# Patient Record
Sex: MALE | Race: WHITE | NOT HISPANIC OR LATINO | Employment: OTHER | URBAN - METROPOLITAN AREA
[De-identification: names, ages, dates, MRNs, and addresses within clinical notes are randomized per-mention and may not be internally consistent; named-entity substitution may affect disease eponyms.]

---

## 2018-10-02 ENCOUNTER — OFFICE VISIT (OUTPATIENT)
Dept: CARDIOLOGY CLINIC | Facility: CLINIC | Age: 68
End: 2018-10-02
Payer: COMMERCIAL

## 2018-10-02 VITALS
HEART RATE: 84 BPM | BODY MASS INDEX: 22.43 KG/M2 | WEIGHT: 148 LBS | HEIGHT: 68 IN | DIASTOLIC BLOOD PRESSURE: 80 MMHG | SYSTOLIC BLOOD PRESSURE: 144 MMHG

## 2018-10-02 DIAGNOSIS — R07.9 CHEST PAIN, UNSPECIFIED TYPE: Primary | ICD-10-CM

## 2018-10-02 DIAGNOSIS — I51.7 LEFT VENTRICULAR HYPERTROPHY BY ELECTROCARDIOGRAM: ICD-10-CM

## 2018-10-02 DIAGNOSIS — I42.1 HOCM (HYPERTROPHIC OBSTRUCTIVE CARDIOMYOPATHY) (HCC): ICD-10-CM

## 2018-10-02 PROCEDURE — 93000 ELECTROCARDIOGRAM COMPLETE: CPT | Performed by: INTERNAL MEDICINE

## 2018-10-02 PROCEDURE — 99204 OFFICE O/P NEW MOD 45 MIN: CPT | Performed by: INTERNAL MEDICINE

## 2018-10-02 RX ORDER — DIPHENOXYLATE HYDROCHLORIDE AND ATROPINE SULFATE 2.5; .025 MG/1; MG/1
1 TABLET ORAL DAILY
COMMUNITY

## 2018-10-02 RX ORDER — LANOLIN ALCOHOL/MO/W.PET/CERES
1 CREAM (GRAM) TOPICAL 3 TIMES DAILY
COMMUNITY
End: 2022-04-11 | Stop reason: CLARIF

## 2018-10-02 NOTE — PROGRESS NOTES
HCM Consultation - Cardiology   Satinder Dallas 74 y o  male MRN: [id number]  Unit/Bed#:  Encounter: [id number]      Assessment & plan: Palpitation, Chest pain, episodic hypertension, family history of sudden cardiac death, family history of cardiomyopathy    Mr Codie Escalante has suffered from palpitation for a number of years  He has family history of sudden cardiac death and a sister that reportedly is diagnosed to have hypertrophic cardiomyopathy  He is active and has been able to play golf 2 days a week and work part time 3 days a week without limitation during low to moderate intensity physical activity  His blood pressure in the office was 144/80 mmHg on arrival and 148/85 mmHg on repeat measurement  His ECG also shows LVH by voltage, vertical axis, and left atrial abnormality  I have recommended that Mr Codie Escalante undergo exercise stress testing to evaluate cardiac anatomy and function, exclude exercise induced ischemia and assess blood pressure response to exercise and monitor heart rhythm during exertion  He may require initiation of antihypertensive therapy  He states that his lipid profile has been checked and has been within normal limits  Physician Requesting Consult: Primary care  Reason for Consult / Principal Problem: Suspected hypertrophic cardiomyopathy    HPI: Satinder Dallas is a 74y o  year old retired  who presents with complaints of palpitation and chest discomfort  The symptoms began in 2014 and triggered work up that included pharmacologic myocardial perfusion imaging (2014) and echocardiogram (2014) that were reportedly negative and a Holter monitor (2014) that showed premature atrial and ventricular premature beats that did not correlate with the timing of his symptoms (2014)  He continued to be symptomatic and was again evaluated in 2016 and 2018  Most recently, he had a 2-week ambulatory ECG monitor that also reportedly showed no high grade arrhythmias or significant pauses   He is healthy, physically fit with a BMI of 22 5 kg/m2 and is a retired  who plays golf for 2 days and works part time for 3 days a week  His past medical history is significant for testicular cancer s/p right orchidectomy and recent diagnosis of prostate enlargement on a CT scan taken in follow up evaluation of his cancer  He does have symptoms of prostatism and in particular wakes up more than once at night to urinate  Mr Dulce Doherty is quite concerned about his symptoms of palpitation, chest discomfort and occasional dizziness particularly after the hospitalization of her sister  Family history  Father  at age 43 suddenly for uncertain reason although he had drinking problem, mother  at age 80 (1) of stroke, she had dementia and bipolar disorder, a 76year old sister has had diabetes and has been diagnosed with "hypertrophic cardiomyopathy" after been taken to a hospital in [place] with chest pain and shortness of breath while running, a 61year old brother committed suicide by shooting himself at age 61 (26)  He also had problem with excess alcohol intake  Mr Dulce Doherty has 2 children, a 36year old son with diabetes and a 28year old daughter without known health problem  He also has four grandchildren, 2 from each of his children  His son has a boy (15) and a girl (5) and his daughter has 3boys (1years old and 6 months old)  The grandchildren have had no heart problems and his daughter has gone through pregnancies without any issue  Consults    Review of Systems:  All systems negative except for: palpitation, chest pain, dizziness, nocturia, difficulty sleeping, daytime sleepiness, arthritis and back pain      Historical Information         No current outpatient prescriptions on file prior to visit  No current facility-administered medications on file prior to visit        No Known Allergies  History   Alcohol Use    Yes     Comment: 1-4 weekly     History   Drug Use No     History Smoking Status    Never Smoker   Smokeless Tobacco    Never Used         Objective   Vitals: Blood pressure 144/80, pulse 84, height 5' 8" (1 727 m), weight 67 1 kg (148 lb)  , Body mass index is 22 5 kg/m² ,     Invasive Devices          No matching active lines, drains, or airways        Physical Exam:  GEN: Shanice West appears well, alert and oriented x 3, pleasant and cooperative   HEENT: pupils equal, round, and reactive to light; extraocular muscles intact  NECK: supple, no carotid bruits   HEART: regular rhythm, normal S1 and S2, no murmurs, clicks, gallops or rubs   LUNGS: clear to auscultation bilaterally; no wheezes, rales, or rhonchi   ABDOMEN: normal bowel sounds, soft, no tenderness, no distention  EXTREMITIES: peripheral pulses normal; no clubbing, cyanosis, or edema  NEURO: no focal findings   SKIN: normal without suspicious lesions on exposed skin    Lab Results:   Labs:  No results found for: WBC, RBC, HGB, HCT, MCV, PLT, RDW  No results found for: NA, K, CL, CO2, ANIONGAP, BUN, CREATININE, EGFR, GLUCOSE, CALCIUM, AST, ALT, ALKPHOS, PROT, ALBUMIN, BILITOT  No results found for: MG  No results found for: CHOL, HDL, TRIG, LDLCALC  No results found for: FDB5LYARGHLX, T3FREE, FREET4, K1TAIVI, Y2ZQLES    Imaging:   I have personally reviewed pertinent results in EPIC    EKG: Normal sinus rhythm at 84 bpm, LVH by voltage, vertical axis, and left atrial abnormality  Cardiac testing:   No results found for this or any previous visit  Counseling / Coordination of Care  Total floor / unit time spent today 60 minutes  Greater than 50% of total time was spent with the patient and / or family counseling and / or coordination of care

## 2018-10-08 DIAGNOSIS — I42.9 CARDIOMYOPATHY, UNSPECIFIED TYPE (HCC): Primary | ICD-10-CM

## 2018-10-11 ENCOUNTER — HOSPITAL ENCOUNTER (OUTPATIENT)
Dept: NON INVASIVE DIAGNOSTICS | Facility: HOSPITAL | Age: 68
Discharge: HOME | End: 2018-10-11
Payer: COMMERCIAL

## 2018-10-11 DIAGNOSIS — I42.9 CARDIOMYOPATHY, UNSPECIFIED TYPE (HCC): ICD-10-CM

## 2018-10-11 PROCEDURE — 93306 TTE W/DOPPLER COMPLETE: CPT | Performed by: INTERNAL MEDICINE

## 2018-10-11 PROCEDURE — 93018 CV STRESS TEST I&R ONLY: CPT | Performed by: INTERNAL MEDICINE

## 2018-10-11 PROCEDURE — 93017 CV STRESS TEST TRACING ONLY: CPT

## 2018-10-11 PROCEDURE — 93306 TTE W/DOPPLER COMPLETE: CPT

## 2018-10-11 PROCEDURE — 93016 CV STRESS TEST SUPVJ ONLY: CPT | Performed by: INTERNAL MEDICINE

## 2018-10-12 LAB
CHEST PAIN STATEMENT: NORMAL
MAX DIASTOLIC BP: 72 MMHG
MAX HEART RATE: 157 BPM
MAX PREDICTED HEART RATE: 155 BPM
MAX. SYSTOLIC BP: 184 MMHG
PROTOCOL NAME: NORMAL
REASON FOR TERMINATION: NORMAL
TARGET HR FORMULA: NORMAL
TEST INDICATION: NORMAL
TIME IN EXERCISE PHASE: NORMAL

## 2018-10-15 ENCOUNTER — TELEPHONE (OUTPATIENT)
Dept: CARDIOLOGY CLINIC | Facility: CLINIC | Age: 68
End: 2018-10-15

## 2018-10-15 NOTE — TELEPHONE ENCOUNTER
Pt called for f/u with Dr El Signs to discuss testing results  Given appt for tomorrow with Dr Tolentino Ink

## 2018-10-16 ENCOUNTER — OFFICE VISIT (OUTPATIENT)
Dept: CARDIOLOGY CLINIC | Facility: CLINIC | Age: 68
End: 2018-10-16
Payer: COMMERCIAL

## 2018-10-16 VITALS
SYSTOLIC BLOOD PRESSURE: 138 MMHG | WEIGHT: 150.3 LBS | HEART RATE: 80 BPM | DIASTOLIC BLOOD PRESSURE: 70 MMHG | BODY MASS INDEX: 22.78 KG/M2 | HEIGHT: 68 IN

## 2018-10-16 DIAGNOSIS — I10 HTN (HYPERTENSION): Primary | ICD-10-CM

## 2018-10-16 PROCEDURE — 99214 OFFICE O/P EST MOD 30 MIN: CPT | Performed by: INTERNAL MEDICINE

## 2018-10-16 RX ORDER — BISOPROLOL FUMARATE 5 MG/1
5 TABLET ORAL DAILY
Qty: 90 TABLET | Refills: 2 | Status: SHIPPED | OUTPATIENT
Start: 2018-10-16 | End: 2019-01-14 | Stop reason: SDUPTHER

## 2018-10-17 NOTE — PROGRESS NOTES
HCM Clinic Follow Up - Cardiology   Ruby Dickey 74 y o  male MRN: [id number]  Unit/Bed#:  Encounter: [id number]      Assessment: Family history of HCM, Palpitation, Hypertension    Plan: Since last office visit on 10-2-2018, Mr Maria Del Carmen Peck has continued to have palpitation and occasional sensation of enhanced arterial pulsations in the neck and head area  He completed an echocardiogram on 10- that showed normal LV size and systolic function with an ejection fraction of 60 % and no regional wall motion abnormalities; no evidence of LVH, no significant valvular abnormality and borderline increase in the size of the aortic root (3 7 cm)  He also had a treadmill exercise performed that showed excellent exercise tolerance (duration of exercise was 13 min and maximal work rate was 15 3 METs), reached a peak heart rate of 157 bpm ( 101 % of maximal predicted heart rate)  He was, however, hypertensive at baseline (162/80) with appropriate blood pressure response to stress  There was no chest pain during stress, the stress ECG was normal and no significant arrhythmias were noted (isolated premature ventricular beats)  His blood pressure was high during echocardiography as well  He does not add salt to his diet although he does eat out often, has maintained an ideal body weight and is quite active  He does have strong family history of hypertension  His lipid profile will be checked by his family physician  I have not yot received the information on her sister's cardiac problem  At this point, I have advised him to start bisoprolol that would help with blood pressure control and perhaps help with the sensation of skipped heart beats that is his main concern at this point  I will review his sister's records when I receive it (apparently with genetic testing result) and advise him regarding need for further evaluation   He will check his blood pressure twice daily and share the findings with me in the next 2 weeks     Physician Requesting Consult: Alicia Mckeon 3 patient    Reason for Consult / Principal Problem: Family history of HCM, Palpitation, Hypertension    HPI: Cresencio Orellana is a 74y o  year old retired  who presents with complaints of palpitation and chest discomfort  The symptoms began in  and triggered work up that included pharmacologic myocardial perfusion imaging () and echocardiogram () that were reportedly negative and a Holter monitor () that showed premature atrial and ventricular premature beats that did not correlate with the timing of his symptoms ()  He continued to be symptomatic and was again evaluated in  and 2018  Most recently, he had a 2-week ambulatory ECG monitor that also reportedly showed no high grade arrhythmias or significant pauses  He is healthy, physically fit with a BMI of 22 5 kg/m2 and is a retired  who plays golf for 2 days and works part time for 3 days a week  His past medical history is significant for testicular cancer s/p right orchidectomy and recent diagnosis of prostate enlargement on a CT scan taken in follow up evaluation of his cancer  He does have symptoms of prostatism and in particular wakes up more than once at night to urinate  Mr Mable Bond is quite concerned about his symptoms of palpitation, chest discomfort and occasional dizziness particularly after the hospitalization of her sister       Family history  Father  at age 43 suddenly for uncertain reason although he had drinking problem, mother  at age 80 (1) of stroke, she had dementia and bipolar disorder, a 76year old sister has had diabetes and has been diagnosed with "hypertrophic cardiomyopathy" after been taken to a hospital in [place] with chest pain and shortness of breath while running, a 61year old brother committed suicide by shooting himself at age 61 (26)  He also had problem with excess alcohol intake   Mr Mable Bond has 2 children, a 36year old son with diabetes and a 28year old daughter without known health problem  He also has four grandchildren, 2 from each of his children  His son has a boy (15) and a girl (5) and his daughter has 3boys (1years old and 6 months old)  The grandchildren have had no heart problems and his daughter has gone through pregnancies without any issue  Consults    Review of Systems:  All systems negative except for: palpitation, chest pain, dizziness, nocturia, difficulty sleeping, daytime sleepiness, arthritis and back pain    Historical Information   No past medical history on file  No past surgical history on file  History   Alcohol Use    Yes     Comment: 1-4 weekly     History   Drug Use No     History   Smoking Status    Never Smoker   Smokeless Tobacco    Never Used     Meds/Allergies   all current active meds have been reviewed  No Known Allergies    Objective   Vitals: Blood pressure 138/70, pulse 80, height 5' 8" (1 727 m), weight 68 2 kg (150 lb 4 8 oz)  , Body mass index is 22 85 kg/m² ,     Invasive Devices          No matching active lines, drains, or airways        Physical Exam:  GEN: Mirta Banegas appears well, alert and oriented x 3, pleasant and cooperative   HEENT: pupils equal, round, and reactive to light; extraocular muscles intact  NECK: supple, no carotid bruits   HEART: regular rhythm, normal S1 and S2, no murmurs, clicks, gallops or rubs   LUNGS: clear to auscultation bilaterally; no wheezes, rales, or rhonchi   ABDOMEN: normal bowel sounds, soft, no tenderness, no distention  EXTREMITIES: peripheral pulses normal; no clubbing, cyanosis, or edema  NEURO: no focal findings   SKIN: normal without suspicious lesions on exposed skin    Lab Results:   No visits with results within 1 Day(s) from this visit     Latest known visit with results is:   Hospital Outpatient Visit on 10/11/2018   Component Date Value    Protocol Name 10/11/2018 STEPHANY     Time In Exercise Phase 10/11/2018 00:13:00     MAX  SYSTOLIC BP 20/59/3204 125     Max Diastolic Bp 24/90/7758 72     Max Heart Rate 10/11/2018 157     Max Predicted Heart Rate 10/11/2018 155     Reason for Termination 10/11/2018 Fatigue     Test Indication 10/11/2018 CARDIOMYOPATHY- HOCM     Target Hr Formular 10/11/2018 (220 - Age)*100%     Chest Pain Statement 10/11/2018 none        Imaging: I have personally reviewed pertinent reports  Echocardiogram, stress test    EKG: ECG strips from stress test    Counseling / Coordination of Care  Total floor / unit time spent today 40 minutes  Greater than 50% of total time was spent with the patient and / or family counseling and / or coordination of care

## 2019-01-14 ENCOUNTER — TELEPHONE (OUTPATIENT)
Dept: CARDIOLOGY CLINIC | Facility: CLINIC | Age: 69
End: 2019-01-14

## 2019-01-14 DIAGNOSIS — I10 HYPERTENSION, UNSPECIFIED TYPE: Primary | ICD-10-CM

## 2019-01-29 ENCOUNTER — OFFICE VISIT (OUTPATIENT)
Dept: CARDIOLOGY CLINIC | Facility: CLINIC | Age: 69
End: 2019-01-29
Payer: COMMERCIAL

## 2019-01-29 VITALS
HEART RATE: 72 BPM | HEIGHT: 68 IN | SYSTOLIC BLOOD PRESSURE: 120 MMHG | BODY MASS INDEX: 22.19 KG/M2 | DIASTOLIC BLOOD PRESSURE: 82 MMHG | WEIGHT: 146.4 LBS

## 2019-01-29 DIAGNOSIS — I10 HYPERTENSION: Primary | ICD-10-CM

## 2019-01-29 PROCEDURE — 99214 OFFICE O/P EST MOD 30 MIN: CPT | Performed by: INTERNAL MEDICINE

## 2019-01-30 NOTE — PROGRESS NOTES
HCM Clinic Follow Up Visit - Cardiology   Zoey De Santiago 79 y o  male MRN: [id number]  Unit/Bed#:  Encounter: [id number]      Assessment: Hypertension, possible HCM      Plan: Mr Sobeida Price has done extremely well since last office visit  He has brought with him home blood pressure readings that range from 115 to 734 mmHg systolic and 70 to 80 mmHg diastolic  He denies chest pain, shortness of breath and dizziness and feels his palpitation has improved significantly  He exercises regularly and remains active, follows a heart healthy diet low in sodium and is compliant with his medications  I reviewed the medical records of his sister, Sweta Ford, who has been evaluated at the [facility] in [place] and appears to me that she has a similar problem likely caused by systemic hypertension  In her evaluation, much emphasis has been given to the reportedly sudden death of their father at age 43 and to Mr Calle's diagnosis of HCM  However, the circumstances of father's death is unclear and the contribution of excess alcohol intake may have been significant  Mr Calle's mild concentric left ventricular hypertrophy is also well explained by his systemic hypertension  Genetic testing at this point does not seem to be helpful considering the otherwise benign family history and mild phenotypic expression  His risk stratification is complete and he would not require further testing at this point  Physician Requesting Consult: Alicia Mckeon 3 Patient  Reason for Consult / Principal Problem: Hypertension, possible HCM  HPI: Georgina Saha a 79y o  year old retired  who presents with complaints of palpitation and chest discomfort   The symptoms began in 2014 and triggered work up that included pharmacologic myocardial perfusion imaging (2014) and echocardiogram (2014) that were reportedly negative and a Holter monitor (2014) that showed premature atrial and ventricular premature beats that did not correlate with the timing of his symptoms (2014)  He continued to be symptomatic and was again evaluated in 2016 and 2018  Most recently, he had a 2-week ambulatory ECG monitor that also reportedly showed no high grade arrhythmias or significant pauses  He is healthy, physically fit with a BMI of 22 5 kg/m2 and is a retired  who plays golf for 2 days and works part time for 3 days a week  His past medical history is significant for testicular cancer s/p right orchidectomy and recent diagnosis of prostate enlargement on a CT scan taken in follow up evaluation of his cancer  He does have symptoms of prostatism and in particular wakes up more than once at night to urinate  Mr Gabriela Jimenes is quite concerned about his symptoms of palpitation, chest discomfort and occasional dizziness particularly after the hospitalization of her sister  He completed an echocardiogram on 10- that showed normal LV size and systolic function with an ejection fraction of 60% and no regional wall motion abnormalities; no evidence of LVH, no significant valvular abnormality and borderline increase in the size of the aortic root (3 7 cm)  He also had a treadmill exercise performed that showed excellent exercise tolerance (duration of exercise was 13 min and maximal work rate was 15 3 METs), reached a peak heart rate of 157 bpm ( 101 % of maximal predicted heart rate)  He was, however, hypertensive at baseline (162/80) with appropriate blood pressure response to stress  There was no chest pain during stress, the stress ECG was normal and no significant arrhythmias were noted (isolated premature ventricular beats)  His blood pressure was high during echocardiography as well  He does not add salt to his diet although he does eat out often, has maintained an ideal body weight and is quite active  He does have strong family history of hypertension  His lipid profile will be checked by his family physician   He was started on bisoprolol to treat high blood pressure and help with the sensation of skipped heart beats that was his main concern at that point  Family history  Father  at age 43 suddenly for uncertain reason although he had drinking problem, mother  at age 80 (1) of stroke, she had dementia and bipolar disorder, a 76year old sister has had diabetes and has been diagnosed with "hypertrophic cardiomyopathy" after been taken to a hospital in [place] with chest pain and shortness of breath while running, a 61year old brother committed suicide by shooting himself at age 61 (26)  He also had problem with excess alcohol intake  Mr Dallas Deng has 2 children, a 36year old son with diabetes and a 28year old daughter without known health problem  He also has four grandchildren, 2 from each of his children  His son has a boy (15) and a girl (5) and his daughter has 3boys (1years old and 6 months old)  The grandchildren have had no heart problems and his daughter has gone through pregnancies without any issue  Consults    Review of Systems: All systems negative except for: rare episode of palpitation usually when in bed at night, denies chest pain, dizziness, nocturia, difficulty sleeping, daytime sleepiness, arthritis and back pain    Historical Information   No past medical history on file  No past surgical history on file  History   Alcohol Use    Yes     Comment: 1-4 weekly     History   Drug Use No     History   Smoking Status    Never Smoker   Smokeless Tobacco    Never Used       Meds/Allergies   all current active meds have been reviewed  No Known Allergies    Objective   Vitals: Blood pressure 120/82, pulse 72, height 5' 8" (1 727 m), weight 66 4 kg (146 lb 6 4 oz)  , Body mass index is 22 26 kg/m² ,       Invasive Devices          No matching active lines, drains, or airways        Physical Exam:  GEN: Mirta Banegas appears well, alert and oriented x 3, pleasant and cooperative   HEENT: pupils equal, round, and reactive to light; extraocular muscles intact  NECK: supple, no carotid bruits   HEART: regular rhythm, normal S1 and S2, no murmurs, clicks, gallops or rubs   LUNGS: clear to auscultation bilaterally; no wheezes, rales, or rhonchi   ABDOMEN: normal bowel sounds, soft, no tenderness, no distention  EXTREMITIES: peripheral pulses normal; no clubbing, cyanosis, or edema  NEURO: no focal findings   SKIN: normal without suspicious lesions on exposed skin    Lab Results:   No visits with results within 1 Day(s) from this visit  Latest known visit with results is:   Hospital Outpatient Visit on 10/11/2018   Component Date Value    Protocol Name 10/11/2018 Vilma Badillo Time In Exercise Phase 10/11/2018 00:13:00     MAX  SYSTOLIC BP 78/67/4469 800     Max Diastolic Bp 06/01/4059 72     Max Heart Rate 10/11/2018 157     Max Predicted Heart Rate 10/11/2018 155     Reason for Termination 10/11/2018 Fatigue     Test Indication 10/11/2018 CARDIOMYOPATHY- HOCM     Target Hr Formular 10/11/2018 (220 - Age)*100%     Chest Pain Statement 10/11/2018 none        Imaging: I have personally reviewed pertinent reports  Counseling / Coordination of Care  Total floor / unit time spent today 40 minutes  Greater than 50% of total time was spent with the patient and / or family counseling and / or coordination of care

## 2019-02-12 RX ORDER — BISOPROLOL FUMARATE 5 MG/1
5 TABLET ORAL DAILY
Qty: 90 TABLET | Refills: 3 | Status: SHIPPED | OUTPATIENT
Start: 2019-02-12 | End: 2019-03-21

## 2019-05-21 ENCOUNTER — OFFICE VISIT (OUTPATIENT)
Dept: CARDIOLOGY CLINIC | Facility: CLINIC | Age: 69
End: 2019-05-21
Payer: COMMERCIAL

## 2019-05-21 VITALS
WEIGHT: 142 LBS | DIASTOLIC BLOOD PRESSURE: 56 MMHG | OXYGEN SATURATION: 97 % | BODY MASS INDEX: 21.52 KG/M2 | HEIGHT: 68 IN | HEART RATE: 88 BPM | SYSTOLIC BLOOD PRESSURE: 118 MMHG

## 2019-05-21 DIAGNOSIS — I10 HYPERTENSION: Primary | ICD-10-CM

## 2019-05-21 PROCEDURE — 99213 OFFICE O/P EST LOW 20 MIN: CPT | Performed by: INTERNAL MEDICINE

## 2019-05-21 RX ORDER — DILTIAZEM HYDROCHLORIDE 60 MG/1
60 CAPSULE, EXTENDED RELEASE ORAL 2 TIMES DAILY
Qty: 60 CAPSULE | Refills: 0 | Status: SHIPPED | OUTPATIENT
Start: 2019-05-21 | End: 2019-06-18 | Stop reason: SDUPTHER

## 2019-06-18 DIAGNOSIS — I10 HYPERTENSION: ICD-10-CM

## 2019-06-19 RX ORDER — DILTIAZEM HYDROCHLORIDE 60 MG/1
60 CAPSULE, EXTENDED RELEASE ORAL 2 TIMES DAILY
Qty: 60 CAPSULE | Refills: 0 | Status: SHIPPED | OUTPATIENT
Start: 2019-06-19 | End: 2019-11-26

## 2019-06-21 ENCOUNTER — TELEPHONE (OUTPATIENT)
Dept: GASTROENTEROLOGY | Facility: CLINIC | Age: 69
End: 2019-06-21

## 2019-11-26 ENCOUNTER — OFFICE VISIT (OUTPATIENT)
Dept: CARDIOLOGY CLINIC | Facility: CLINIC | Age: 69
End: 2019-11-26
Payer: COMMERCIAL

## 2019-11-26 VITALS
HEIGHT: 68 IN | WEIGHT: 141.2 LBS | HEART RATE: 76 BPM | SYSTOLIC BLOOD PRESSURE: 136 MMHG | BODY MASS INDEX: 21.4 KG/M2 | DIASTOLIC BLOOD PRESSURE: 84 MMHG

## 2019-11-26 DIAGNOSIS — I10 ESSENTIAL HYPERTENSION: Primary | ICD-10-CM

## 2019-11-26 DIAGNOSIS — R00.2 PALPITATIONS: ICD-10-CM

## 2019-11-26 DIAGNOSIS — I49.3 PVC (PREMATURE VENTRICULAR CONTRACTION): ICD-10-CM

## 2019-11-26 DIAGNOSIS — I49.1 APC (ATRIAL PREMATURE CONTRACTIONS): ICD-10-CM

## 2019-11-26 PROCEDURE — 99214 OFFICE O/P EST MOD 30 MIN: CPT | Performed by: INTERNAL MEDICINE

## 2019-11-26 PROCEDURE — 93000 ELECTROCARDIOGRAM COMPLETE: CPT | Performed by: INTERNAL MEDICINE

## 2019-11-26 RX ORDER — DOXAZOSIN MESYLATE 1 MG/1
1 TABLET ORAL
Qty: 30 TABLET | Refills: 0 | Status: SHIPPED | OUTPATIENT
Start: 2019-11-26 | End: 2022-04-11 | Stop reason: CLARIF

## 2019-11-26 NOTE — PROGRESS NOTES
[facility] Follow Up Visit - Cardiology   Mary Cartwright 81 y o  male MRN: [id number]  Unit/Bed#:  Encounter: [id number]      Active problem list:    Patient Active Problem List    Diagnosis Date Noted    Left ventricular hypertrophy by electrocardiogram 10/02/2018     Priority: Low    [facility] 10/02/2018     Priority: Low       Plan: Mr Xu Coto has not been tolerating the diltiazem and has stopped taking it  He particularly had noticed problems with erectile dysfunction  His home blood pressure readings show values as high as 159/96 mmHg  Today in the clinic his blood pressure is 136/84 mmHg  He denies headaches but has noted that his palpitation has been quite worse after stopping the diltiazem  His cancer is under control and has not shown recurrence after surgery and his prostate problem is stable  He remains active and works part time  I have advised Mr Rosmery Bazan to continue medical therapy for his systemic hypertension and particularly do not stop the medication on his own  He was started on doxazocin today at a very small dose hoping that it would also help with his prostatism symptoms  I have also ordered an extended ambulatory monitor for him to evaluate his frequent and disabling symptom of palpitation  He will be emailing his blood pressure readings and let me know how he feels on the new medication in about a week  He has had blood work done 2 months ago that I do not have access to and he will be sending those to me as well  Physician Requesting Consult: Primary care  Reason for Consult / Principal Problem: Suspected hypertrophic cardiomyopathyHPI: Mary Cartwright   HPI: Shon Living a 81 year old retired home [address] [place] [address]  presented with complaints of palpitation and chest discomfort   The symptoms began in 2014 and triggered work up that included pharmacologic myocardial perfusion imaging (2014) and echocardiogram (2014) that were reportedly negative and a Holter monitor (2014) that showed premature atrial and ventricular premature beats that did not correlate with the timing of his symptoms (2014)  He continued to be symptomatic and was again evaluated in 2016 and 2018  Most recently, he had a 2-week ambulatory ECG monitor that also reportedly showed no high grade arrhythmias or significant pauses  He is healthy, physically fit with a BMI of 22 5 kg/m2, plays golf for 2 days and works part time for 3 days a week  His past medical history is significant for testicular cancer (seminoma) s/p right orchidectomy and recent diagnosis of prostate enlargement on a CT scan taken in follow up evaluation of his cancer  He does have symptoms of prostatism and in particular wakes up more than once at night to urinate  Mr Destiny Kimball is quite concerned about his symptoms of palpitation, chest discomfort and occasional dizziness particularly after the hospitalization of her sister  He completed an echocardiogram on 10- that showed normal LV size and systolic function with an ejection fraction of 60% and no regional wall motion abnormalities; no evidence of LVH, no significant valvular abnormality and borderline increase in the size of the aortic root (3 7 cm)  He also had a treadmill exercise performed that showed excellent exercise tolerance (duration of exercise was 13 min and maximal work rate was 15 3 METs), reached a peak heart rate of 157 bpm (101 % of maximal predicted heart rate)  He was, however, hypertensive at baseline (162/80) with appropriate blood pressure response to stress  There was no chest pain during stress, the stress ECG was normal and no significant arrhythmias were noted (isolated premature ventricular beats)  His blood pressure was high during echocardiography as well  He does not add salt to his diet although he does eat out often, has maintained an ideal body weight and is quite active  He does have strong family history of hypertension   His lipid profile is being checked by his family physician  He was started on bisoprolol to treat high blood pressure and help with the sensation of skipped heart beats that was his main concern at that point  On 1/29/2019: Mr Lily Montana has done extremely well since last office visit  He has brought with him home blood pressure readings that range from 115 to 206 mmHg systolic and 70 to 80 mmHg diastolic  He denies chest pain, shortness of breath and dizziness and feels his palpitation has improved significantly  He exercises regularly and remains active, follows a heart healthy diet low in sodium and is compliant with his medications  I reviewed the medical records of his sister, Rachel Hawkins, who has been evaluated at the [facility] in [place] and appears to me that she has a similar problem likely caused by systemic hypertension  In her evaluation, much emphasis has been given to the reportedly sudden death of their father at age 43 and to Mr Calle's diagnosis of HCM  However, the circumstances of father's death is unclear and the contribution of excess alcohol intake may have been significant  Mr Calle's mild concentric left ventricular hypertrophy is also well explained by his systemic hypertension  Genetic testing at this point does not seem to be helpful considering the otherwise benign family history and mild phenotypic expression  His risk stratification is complete and he would not require further testing at this point  On 5/21/2019: Mr Lily Montana has not tolerated the bisoprolol that was started for both hypertension and palpitation related to premature ventricular contractions  The medication was effective for both but resulted in insomnia, anxiety, chest pain, cold feet and hands, joint pain and most importantly problems getting and maintaining an erection   Even cutting the dose from 5 to 2 5 mg per day did not resolve the side effects and he stopped the medication altogether and noted rebound hypertension (140/90) and increased heart rate (resting heart rate of 90-95 bpm)  He was then started on losartan that has improved blood pressure control but has not done much for the rapid heart rate and palpitation  Today I have switched him from losartan to diltiazem hoping to be able to control both hypertension and palpitation  He will check his blood pressure on a daily basis and send me the results electronically  He has had a CT of chest with contrast on 3- outside the network that has reported mild dilation of the ascending aorta (4 1 cm)  He is scheduled to have a repeat study done next March  I have asked him to also let me know hoe diltiazem is working for his palpitation  If tolerated, it will be switched to once daily formulation of diltiazem       Family history  Father  at age 43 suddenly for uncertain reason although he had drinking problem, mother  at age 80 (1) of stroke, she had dementia and bipolar disorder, a 76year old sister has had diabetes and has been diagnosed with "hypertrophic cardiomyopathy" after being taken to a hospital in [place] with chest pain and shortness of breath while running, a brother committed suicide by shooting himself at age 61 ()  He also had problem with excess alcohol intake  Mr Joan Felipe has 2 children, a 36year old son with diabetes (on insulin pump) and a 28year old daughter without known health problem  He also has four grandchildren, 2 from each of his children  His son has a boy (15) and a girl (5) and his daughter has 3boys (1years old and 6 months old)  The grandchildren have had no heart problems and his daughter has gone through pregnancies without any issue      Review of Systems: Complains of insomnia partly related to his nocturia caused by an enlarged prostate  Denies chest pain, shortness of breath, dizziness or syncope  Shamir Fay does however experience palpitation on a daily basis   Has not noted and leg edema or ankle swelling  Social History     Substance and Sexual Activity   Alcohol Use Yes    Comment: 1-4 weekly     Social History     Substance and Sexual Activity   Drug Use No     Social History     Tobacco Use   Smoking Status Never Smoker   Smokeless Tobacco Never Used     Meds/Allergies   all current active meds have been reviewed  No Known Allergies    Objective   Vitals: Blood pressure 136/84, pulse 76, height 5' 8" (1 727 m), weight 64 kg (141 lb 3 2 oz)  , Body mass index is 21 47 kg/m² ,     Invasive Devices     None               Physical Exam:    GEN: Tyler Greene appears well, alert and oriented x 3, pleasant and cooperative   HEENT: pupils equal, round, and reactive to light; extraocular muscles intact  NECK: supple, no carotid bruits   HEART: regular rhythm, normal S1 and S2, no murmurs, clicks, gallops or rubs   LUNGS: clear to auscultation bilaterally; no wheezes, rales, or rhonchi   ABDOMEN: normal bowel sounds, soft, no tenderness, no distention  EXTREMITIES: peripheral pulses normal; no clubbing, cyanosis, or edema  NEURO: no focal findings   SKIN: normal without suspicious lesions on exposed skin    Lab Results:   No visits with results within 1 Day(s) from this visit  Latest known visit with results is:   Hospital Outpatient Visit on 10/11/2018   Component Date Value    Protocol Name 10/11/2018 [facility] Time In Exercise Phase 10/11/2018 00:13:00     MAX  SYSTOLIC BP 58/18/9893 668     Max Diastolic Bp 45/56/5948 72     Max Heart Rate 10/11/2018 157     Max Predicted Heart Rate 10/11/2018 155     Reason for Termination 10/11/2018 Fatigue     Test Indication 10/11/2018 CARDIOMYOPATHY- HOCM     Target Hr Formular 10/11/2018 (220 - Age)*100%     Chest Pain Statement 10/11/2018 none        Imaging: I have personally reviewed pertinent reports        EKG: Normal sinus rhythm at 76 bpm, possible left atrial abnormality    Counseling / Coordination of Care  Total floor / unit time spent today 40 minutes  Greater than 50% of total time was spent with the patient and / or family counseling and / or coordination of care

## 2019-12-04 ENCOUNTER — TELEPHONE (OUTPATIENT)
Dept: CARDIOLOGY CLINIC | Facility: CLINIC | Age: 69
End: 2019-12-04

## 2019-12-04 NOTE — TELEPHONE ENCOUNTER
Please verify if this patient needs prior authorization for zio path referral is in epic please check and let me know  Thank you

## 2019-12-16 ENCOUNTER — TELEPHONE (OUTPATIENT)
Dept: CARDIOLOGY CLINIC | Facility: CLINIC | Age: 69
End: 2019-12-16

## 2019-12-16 DIAGNOSIS — I42.1 HOCM (HYPERTROPHIC OBSTRUCTIVE CARDIOMYOPATHY) (HCC): ICD-10-CM

## 2019-12-16 DIAGNOSIS — I49.3 PVC (PREMATURE VENTRICULAR CONTRACTION): Primary | ICD-10-CM

## 2019-12-23 ENCOUNTER — HOSPITAL ENCOUNTER (OUTPATIENT)
Dept: NON INVASIVE DIAGNOSTICS | Facility: CLINIC | Age: 69
Discharge: HOME | End: 2019-12-23
Payer: COMMERCIAL

## 2019-12-23 DIAGNOSIS — I49.3 PVC (PREMATURE VENTRICULAR CONTRACTION): ICD-10-CM

## 2019-12-23 DIAGNOSIS — I42.1 HOCM (HYPERTROPHIC OBSTRUCTIVE CARDIOMYOPATHY) (HCC): ICD-10-CM

## 2019-12-23 PROCEDURE — 93226 XTRNL ECG REC<48 HR SCAN A/R: CPT

## 2019-12-23 PROCEDURE — 93225 XTRNL ECG REC<48 HRS REC: CPT

## 2019-12-31 PROCEDURE — 93227 XTRNL ECG REC<48 HR R&I: CPT | Performed by: INTERNAL MEDICINE

## 2020-01-02 ENCOUNTER — TELEPHONE (OUTPATIENT)
Dept: CARDIOLOGY CLINIC | Facility: CLINIC | Age: 70
End: 2020-01-02

## 2020-01-02 NOTE — TELEPHONE ENCOUNTER
P/C for holter results since zio patch denied  Pt has a f/u appt with Dr Suyapa Stringer 3/3/20  I/G#[phone]

## 2020-03-03 ENCOUNTER — OFFICE VISIT (OUTPATIENT)
Dept: CARDIOLOGY CLINIC | Facility: CLINIC | Age: 70
End: 2020-03-03
Payer: COMMERCIAL

## 2020-03-03 VITALS
DIASTOLIC BLOOD PRESSURE: 80 MMHG | WEIGHT: 142 LBS | HEART RATE: 92 BPM | BODY MASS INDEX: 21.52 KG/M2 | SYSTOLIC BLOOD PRESSURE: 128 MMHG | HEIGHT: 68 IN

## 2020-03-03 DIAGNOSIS — I10 ESSENTIAL HYPERTENSION: Primary | ICD-10-CM

## 2020-03-03 PROBLEM — C62.11 SEMINOMA OF DESCENDED RIGHT TESTIS (HCC): Status: ACTIVE | Noted: 2020-03-03

## 2020-03-03 PROBLEM — R00.2 PALPITATIONS: Status: ACTIVE | Noted: 2020-03-03

## 2020-03-03 PROBLEM — Z90.79 H/O UNILATERAL ORCHIECTOMY: Status: ACTIVE | Noted: 2020-03-03

## 2020-03-03 PROCEDURE — 99214 OFFICE O/P EST MOD 30 MIN: CPT | Performed by: INTERNAL MEDICINE

## 2020-03-03 NOTE — PROGRESS NOTES
[facility] Follow Up Visit - Cardiology   Chris Mabry 81 y o  male MRN: [id number]  Unit/Bed#:  Encounter: [id number]      Active problem list:    Patient Active Problem List   Diagnosis    Left ventricular hypertrophy by electrocardiogram    [facility]    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    H/O unilateral orchiectomy    Hypertension    Palpitations       Plan: Mr Opal Casey has been managing his hypertension with diet and exercise and has not taken any of his antihypertensive medications  He continues to complain of insomnia and nocturia due to benign prostate enlargement but chooses not to take the prescribed doxazocin  He is scheduled to have chest and abdomen CT to evaluate his ascending aortic aneurysm (41 mm) and the seminoma post surgical right orchidectomy  He will also see his oncologist after imaging studies are done  He will also have blood work including lipid profile  Ambulatory ECG monitor performed in December showed few isolated atrial and ventricular premature beats that did not correlate with the sensation of skipped beats  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  He also plays golf on Mondays and Wednesdays  He is following a heart healthy diet and has kept a normal body mass index  Physician Requesting Consult: Primary care  Reason for Consult / Principal Problem: Suspected hypertrophic cardiomyopathy     HPI: Bubba Calle is a 81 year old retired  who presented with complaints of palpitation and chest discomfort  The symptoms began in 2014 and triggered work up that included pharmacologic myocardial perfusion imaging (2014) and echocardiogram (2014) that were reportedly negative and a Holter monitor (2014) that showed premature atrial and ventricular premature beats that did not correlate with the timing of his symptoms (2014)   He continued to be symptomatic and was again evaluated in 2016 and 2018  Most recently, he had a 2-week ambulatory ECG monitor that also reportedly showed no high grade arrhythmias or significant pauses  He is healthy, physically fit with a BMI of 22 5 kg/m2, plays golf for 2 days and works part time for 3 days a week  His past medical history is significant for testicular cancer (seminoma) s/p right orchidectomy and recent diagnosis of prostate enlargement on a CT scan taken in follow up evaluation of his cancer  He does have symptoms of prostatism and in particular wakes up more than once at night to urinate  Mr Britany Herrera is quite concerned about his symptoms of palpitation, chest discomfort and occasional dizziness particularly after the hospitalization of her sister  He completed an echocardiogram on 10- that showed normal LV size and systolic function with an ejection fraction of 60% and no regional wall motion abnormalities; no evidence of LVH, no significant valvular abnormality and borderline increase in the size of the aortic root (3 7 cm)  He also had a treadmill exercise performed that showed excellent exercise tolerance (duration of exercise was 13 min and maximal work rate was 15 3 METs), reached a peak heart rate of 157 bpm (101 % of maximal predicted heart rate)  He was, however, hypertensive at baseline (162/80) with appropriate blood pressure response to stress  There was no chest pain during stress, the stress ECG was normal and no significant arrhythmias were noted (isolated premature ventricular beats)  His blood pressure was high during echocardiography as well  He does not add salt to his diet although he does eat out often, has maintained an ideal body weight and is quite active  He does have strong family history of hypertension  His lipid profile is being checked by his family physician   He was started on bisoprolol to treat high blood pressure and help with the sensation of skipped heart beats that was his main concern at that point       On 1/29/2019: Mr Leonard Ramos has done extremely well since last office visit  He has brought with him home blood pressure readings that range from 115 to 045 mmHg systolic and 70 to 80 mmHg diastolic  He denies chest pain, shortness of breath and dizziness and feels his palpitation has improved significantly  He exercises regularly and remains active, follows a heart healthy diet low in sodium and is compliant with his medications  I reviewed the medical records of his sister, Eugenia Sanchez, who has been evaluated at the [facility] in [place] and appears to me that she has a similar problem likely caused by systemic hypertension  In her evaluation, much emphasis has been given to the reportedly sudden death of their father at age 43 and to Mr Calle's diagnosis of HCM  However, the circumstances of father's death is unclear and the contribution of excess alcohol intake may have been significant  Mr Calle's mild concentric left ventricular hypertrophy is also well explained by his systemic hypertension  Genetic testing at this point does not seem to be helpful considering the otherwise benign family history and mild phenotypic expression  His risk stratification is complete and he would not require further testing at this point      On 5/21/2019: Mr Leonard Ramos has not tolerated the bisoprolol that was started for both hypertension and palpitation related to premature ventricular contractions  The medication was effective for both but resulted in insomnia, anxiety, chest pain, cold feet and hands, joint pain and most importantly problems getting and maintaining an erection  Even cutting the dose from 5 to 2 5 mg per day did not resolve the side effects and he stopped the medication altogether and noted rebound hypertension (140/90) and increased heart rate (resting heart rate of 90-95 bpm)   He was then started on losartan that has improved blood pressure control but has not done much for the rapid heart rate and palpitation  Today I have switched him from losartan to diltiazem hoping to be able to control both hypertension and palpitation  He will check his blood pressure on a daily basis and send me the results electronically  He has had a CT of chest with contrast on 3- outside the network that has reported mild dilation of the ascending aorta (4 1 cm)  He is scheduled to have a repeat study done next March  I have asked him to also let me know hoe diltiazem is working for his palpitation  If tolerated, it will be switched to once daily formulation of diltiazem  On 2019: Mr Sobeida Christine has not been tolerating the diltiazem and has stopped taking it  He particularly had noticed problems with erectile dysfunction  His home blood pressure readings show values as high as 159/96 mmHg  Today in the clinic his blood pressure is 136/84 mmHg  He denies headaches but has noted that his palpitation has been quite worse after stopping the diltiazem  His cancer is under control and has not shown recurrence after surgery and his prostate problem is stable  He remains active and works part time  I have advised Mr Destiny Kimball to continue medical therapy for his systemic hypertension and particularly do not stop the medication on his own  He was started on doxazocin today at a very small dose hoping that it would also help with his prostatism symptoms  I have also ordered an extended ambulatory monitor for him to evaluate his frequent and disabling symptom of palpitation  He will be emailing his blood pressure readings and let me know how he feels on the new medication in about a week   He has had blood work done 2 months ago that I do not have access to and he will be sending those to me as well       Family history  Father  at age 43 suddenly for uncertain reason although he had drinking problem, mother  at age 80 (1) of stroke, she had dementia and bipolar disorder, a 76year old sister has had diabetes and has been diagnosed with "hypertrophic cardiomyopathy" after being taken to a hospital in [place] with chest pain and shortness of breath while running, a brother committed suicide by shooting himself at age 61 (2010)  He also had problem with excess alcohol intake  Mr Palak Londono has 2 children, a 43year old son with diabetes (on insulin pump) and a 40year old daughter without known health problem  He also has four grandchildren, 2 from each of his children  His son has a boy (17) and a girl (8) and his daughter has 3boys (3years old and 3year old)  The grandchildren have had no heart problems and his daughter has gone through pregnancies without any issue  She is now pregnant and expecting to have a girl      Review of Systems: Complains of insomnia partly related to his nocturia caused by an enlarged prostate  Denies chest pain, shortness of breath, dizziness or syncope  Does not have as many skipped beats as before but has noticed that his heart rate is often fast  Has not noted and leg edema or ankle swelling      Historical Information   Past Medical History:   Diagnosis Date    Prostatism     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      No past surgical history on file  Social History     Substance and Sexual Activity   Alcohol Use Yes    Comment: 1-4 weekly     Social History     Substance and Sexual Activity   Drug Use No     Social History     Tobacco Use   Smoking Status Never Smoker   Smokeless Tobacco Never Used     Meds/Allergies   all current active meds have been reviewed  No Known Allergies    Objective   Vitals:   Vitals:    03/03/20 0903   BP: 128/80   BP Location: Right arm   Patient Position: Sitting   Cuff Size: Standard   Pulse: 92   Weight: 64 4 kg (142 lb)   Height: 5' 8" (1 727 m)   Body mass index is 21 59 kg/m²  Body surface area is 1 77 meters squared      Physical Exam:  GEN: Alessandro Lockwood appears well, alert and oriented x 3, pleasant and cooperative HEENT: pupils equal, round, and reactive to light; extraocular muscles intact  NECK: supple, no carotid bruits   HEART: regular rhythm, normal S1 and S2, no murmurs, clicks, gallops or rubs   LUNGS: clear to auscultation bilaterally; no wheezes, rales, or rhonchi   ABDOMEN: normal bowel sounds, soft, no tenderness, no distention  EXTREMITIES: peripheral pulses normal; no clubbing, cyanosis, or edema  NEURO: no focal findings   SKIN: normal without suspicious lesions on exposed skin    Lab Results:   No visits with results within 1 Day(s) from this visit  Latest known visit with results is:   Hospital Outpatient Visit on 10/11/2018   Component Date Value    Protocol Name 10/11/2018 [place] Time In Exercise Phase 10/11/2018 00:13:00     MAX  SYSTOLIC BP  422     Max Diastolic Bp  72     Max Heart Rate 10/11/2018 157     Max Predicted Heart Rate 10/11/2018 155     Reason for Termination 10/11/2018 Fatigue     Test Indication 10/11/2018 CARDIOMYOPATHY- HOCM     Target Hr Formular 10/11/2018 (220 - Age)*100%     Chest Pain Statement 10/11/2018 none      Cardiac testing:    Holter monitor - 48 hour   Status: Final result        PT NAME: Nomi Barragan  : 1950                      AGE: 81 y o  GENDER: male  MRN: [id number]                               PROCEDURE: Holter monitor - 48 hour      INDICATIONS: PVCs     DESCRIPTION OF FINDINGS:  The patient was monitored for a total of 48 hours  The patient was in sinus rhythm throughout the study  The average heart rate was 89 beats per minute  The heart rate ranged from 59 to 150 beats per minute      Ventricular ectopic activity consisted of 273 beats (0 1% of total beats)  2 were in couplets, 201 were single PVCs, 70 were single VEs  There was no sustained or nonsustained ventricular tachycardia      Supraventricular ectopic activity consisted of 235 beats (0 1% of total beats)  235 were single PACs   There was no supraventricular tachycardia identified  There was no evidence of atrial fibrillation or atrial flutter      There were no significant pauses  The longest R-R interval was 1 3 seconds  There was no evidence of advanced degree heart block      The accompanying patient diary notes symptoms of "skipped beats"  Correlation with the tracings at these times reveals isolated PVCs on some occasions; sinus rhythm or sinus tachycardia on other occasions      IMPRESSION:  1  No significant arrhythmia noted  2  Symptoms of skipped beats occasionally correlate to isolated PVCs, but the patient also appears to have this sensation without PVCs  3  Total PVC burden 273 beats (0 1%)  4  Total PAC burden 235 beats (0 1%)     Fellow: Chelsy Greene MD   Attending: Colton Diaz MD       Imaging: I have personally reviewed pertinent reports  Counseling / Coordination of Care  Total floor / unit time spent today 40 minutes  Greater than 50% of total time was spent with the patient and / or family counseling and / or coordination of care

## 2020-07-19 DIAGNOSIS — Z86.010 HISTORY OF COLON POLYPS: ICD-10-CM

## 2020-07-19 DIAGNOSIS — Z11.59 SCREENING FOR VIRAL DISEASE: ICD-10-CM

## 2020-07-19 PROCEDURE — U0003 INFECTIOUS AGENT DETECTION BY NUCLEIC ACID (DNA OR RNA); SEVERE ACUTE RESPIRATORY SYNDROME CORONAVIRUS 2 (SARS-COV-2) (CORONAVIRUS DISEASE [COVID-19]), AMPLIFIED PROBE TECHNIQUE, MAKING USE OF HIGH THROUGHPUT TECHNOLOGIES AS DESCRIBED BY CMS-2020-01-R: HCPCS

## 2020-07-21 LAB
INPATIENT: NORMAL
SARS-COV-2 RNA SPEC QL NAA+PROBE: NOT DETECTED

## 2020-07-22 ENCOUNTER — CLINICAL SUPPORT (OUTPATIENT)
Dept: GASTROENTEROLOGY | Facility: CLINIC | Age: 70
End: 2020-07-22

## 2020-07-22 DIAGNOSIS — Z86.010 HISTORY OF COLON POLYPS: Primary | ICD-10-CM

## 2020-07-23 VITALS — HEIGHT: 68 IN | WEIGHT: 142 LBS | BODY MASS INDEX: 21.52 KG/M2

## 2020-07-23 NOTE — PROGRESS NOTES
Phone prep with pt  Dx: hx of polyps  Rx sent to provider for signature  Instructions for suprep given  Meds reviewed  Instructions emailed to pt  covid testing completed

## 2020-07-27 ENCOUNTER — TELEPHONE (OUTPATIENT)
Dept: GASTROENTEROLOGY | Facility: CLINIC | Age: 70
End: 2020-07-27

## 2020-07-27 NOTE — TELEPHONE ENCOUNTER
Pt call transf'd from Endo  Pt reports prep too expensive  Left sample of SuPrep at front office/Pt will  this afternoon

## 2020-07-29 ENCOUNTER — HOSPITAL ENCOUNTER (OUTPATIENT)
Dept: GASTROENTEROLOGY | Facility: AMBULATORY SURGERY CENTER | Age: 70
Discharge: HOME | End: 2020-07-29
Payer: COMMERCIAL

## 2020-07-29 ENCOUNTER — ANESTHESIA EVENT (OUTPATIENT)
Dept: GASTROENTEROLOGY | Facility: AMBULATORY SURGERY CENTER | Age: 70
End: 2020-07-29

## 2020-07-29 ENCOUNTER — ANESTHESIA (OUTPATIENT)
Dept: GASTROENTEROLOGY | Facility: AMBULATORY SURGERY CENTER | Age: 70
End: 2020-07-29

## 2020-07-29 VITALS
OXYGEN SATURATION: 98 % | TEMPERATURE: 98.2 F | SYSTOLIC BLOOD PRESSURE: 149 MMHG | DIASTOLIC BLOOD PRESSURE: 93 MMHG | RESPIRATION RATE: 18 BRPM | HEART RATE: 72 BPM

## 2020-07-29 DIAGNOSIS — Z86.010 HISTORY OF COLON POLYPS: ICD-10-CM

## 2020-07-29 PROCEDURE — G0105 COLORECTAL SCRN; HI RISK IND: HCPCS | Performed by: INTERNAL MEDICINE

## 2020-07-29 RX ORDER — SODIUM CHLORIDE 9 MG/ML
50 INJECTION, SOLUTION INTRAVENOUS CONTINUOUS
Status: DISCONTINUED | OUTPATIENT
Start: 2020-07-29 | End: 2020-08-02 | Stop reason: HOSPADM

## 2020-07-29 RX ORDER — PROPOFOL 10 MG/ML
INJECTION, EMULSION INTRAVENOUS AS NEEDED
Status: DISCONTINUED | OUTPATIENT
Start: 2020-07-29 | End: 2020-07-29 | Stop reason: SURG

## 2020-07-29 RX ADMIN — PROPOFOL 30 MG: 10 INJECTION, EMULSION INTRAVENOUS at 08:33

## 2020-07-29 RX ADMIN — SODIUM CHLORIDE 50 ML/HR: 9 INJECTION, SOLUTION INTRAVENOUS at 07:57

## 2020-07-29 RX ADMIN — PROPOFOL 30 MG: 10 INJECTION, EMULSION INTRAVENOUS at 08:30

## 2020-07-29 RX ADMIN — PROPOFOL 30 MG: 10 INJECTION, EMULSION INTRAVENOUS at 08:43

## 2020-07-29 RX ADMIN — PROPOFOL 30 MG: 10 INJECTION, EMULSION INTRAVENOUS at 08:36

## 2020-07-29 RX ADMIN — PROPOFOL 30 MG: 10 INJECTION, EMULSION INTRAVENOUS at 08:48

## 2020-07-29 RX ADMIN — PROPOFOL 80 MG: 10 INJECTION, EMULSION INTRAVENOUS at 08:28

## 2020-07-29 RX ADMIN — PROPOFOL 30 MG: 10 INJECTION, EMULSION INTRAVENOUS at 08:40

## 2020-07-29 NOTE — DISCHARGE INSTRUCTIONS
High Fiber Diet   WHAT YOU NEED TO KNOW:   A high-fiber diet includes foods that have a high amount of fiber  Fiber is the part of fruits, vegetables, and grains that is not broken down by your body  Fiber keeps your bowel movements regular  Fiber can also help lower your cholesterol level, control blood sugar in people with diabetes, and relieve constipation  Fiber can also help you control your weight because it helps you feel full faster  Most adults should eat 25 to 35 grams of fiber each day  Talk to your dietitian or healthcare provider about the amount of fiber you need  DISCHARGE INSTRUCTIONS:   Good sources of fiber:   · Foods with at least 4 grams of fiber per serving:      ¨ ? to ½ cup of high-fiber cereal (check the nutrition label on the box)    ¨ ½ cup of blackberries or raspberries    ¨ 4 dried prunes    ¨ 1 cooked artichoke    ¨ ½ cup of cooked legumes, such as lentils, or red, kidney, and rothman beans    · Foods with 1 to 3 grams of fiber per serving:      ¨ 1 slice of whole-wheat, pumpernickel, or rye bread    ¨ ½ cup of cooked brown rice    ¨ 4 whole-wheat crackers    ¨ 1 cup of oatmeal    ¨ ½ cup of cereal with 1 to 3 grams of fiber per serving (check the nutrition label on the box)    ¨ 1 small piece of fruit, such as an apple, banana, pear, kiwi, or orange    ¨ 3 dates    ¨ ½ cup of canned apricots, fruit cocktail, peaches, or pears    ¨ ½ cup of raw or cooked vegetables, such as carrots, cauliflower, cabbage, spinach, squash, or corn  Ways that you can increase fiber in your diet:   · Choose brown or wild rice instead of white rice  · Use whole wheat flour in recipes instead of white or all-purpose flour  · Add beans and peas to casseroles or soups  · Choose fresh fruit and vegetables with peels or skins on instead of juices  Other diet guidelines to follow:   · Add fiber to your diet slowly    You may have abdominal discomfort, bloating, and gas if you add fiber to your diet too quickly  · Drink plenty of liquids as you add fiber to your diet  You may have nausea or develop constipation if you do not drink enough water  Ask how much liquid to drink each day and which liquids are best for you  © 2017 [employer] Information is for End User's use only and may not be sold, redistributed or otherwise used for commercial purposes  All illustrations and images included in CareNotes® are the copyrighted property of [employer]  or [employer]  The above information is an  only  It is not intended as medical advice for individual conditions or treatments  Talk to your doctor, nurse or pharmacist before following any medical regimen to see if it is safe and effective for you  Hemorrhoids   WHAT YOU NEED TO KNOW:   What are hemorrhoids? Hemorrhoids are swollen blood vessels inside your rectum (internal hemorrhoids) or on your anus (external hemorrhoids)  Sometimes a hemorrhoid may prolapse  This means it extends out of your anus  What increases my risk for hemorrhoids? · Pregnancy or obesity    · Straining or sitting for a long time during bowel movements    · Liver disease    · Weak muscles around the anus caused by older age, rectal surgery, or anal intercourse    · A lack of physical activity    · Chronic diarrhea or constipation    · A low-fiber diet  What are the signs and symptoms of hemorrhoids? · Pain or itching around your anus or inside your rectum    · Swelling or bumps around your anus    · Bright red blood in your bowel movement, on the toilet paper, or in the toilet bowl    · Tissue bulging out of your anus (prolapsed hemorrhoids)    · Incontinence (poor control over urine or bowel movements)  How are hemorrhoids diagnosed? Your healthcare provider will ask about your symptoms, the foods you eat, and your bowel movements  He will examine your anus for external hemorrhoids   You may need the following:  · A digital rectal exam  is a test to check for hemorrhoids  Your healthcare provider will put a gloved finger inside your anus to feel for the hemorrhoids  · An anoscopy  is a test that uses a scope (small tube with a light and camera on the end) to look at your hemorrhoids  How are hemorrhoids treated? Treatment will depend on your symptoms  You may need any of the following:  · Medicines  can help decrease pain and swelling, and soften your bowel movement  The medicine may be a pill, pad, cream, or ointment  · Procedures  may be used to shrink or remove your hemorrhoid  Examples include rubber-band ligation, sclerotherapy, and photocoagulation  These procedures may be done in your healthcare provider's office  Ask your healthcare provider for more information about these procedures  · Surgery  may be needed to shrink or remove your hemorrhoids  How can I manage my symptoms? · Apply ice on your anus for 15 to 20 minutes every hour or as directed  Use an ice pack, or put crushed ice in a plastic bag  Cover it with a towel before you apply it to your anus  Ice helps prevent tissue damage and decreases swelling and pain  · Take a sitz bath  Fill a bathtub with 4 to 6 inches of warm water  You may also use a sitz bath pan that fits inside a toilet bowl  Sit in the sitz bath for 15 minutes  Do this 3 times a day, and after each bowel movement  The warm water can help decrease pain and swelling  · Keep your anal area clean  Gently wash the area with warm water daily  Soap may irritate the area  After a bowel movement, wipe with moist towelettes or wet toilet paper  Dry toilet paper can irritate the area  How can I help prevent hemorrhoids? · Do not strain to have a bowel movement  Do not sit on the toilet too long  These actions can increase pressure on the tissues in your rectum and anus  · Drink plenty of liquids  Liquids can help prevent constipation   Ask how much liquid to drink each day and which liquids are best for you  · Eat a variety of high-fiber foods  Examples include fruits, vegetables, and whole grains  Ask your healthcare provider how much fiber you need each day  You may need to take a fiber supplement  · Exercise as directed  Exercise, such as walking, may make it easier to have a bowel movement  Ask your healthcare provider to help you create an exercise plan  · Do not have anal sex  Anal sex can weaken the skin around your rectum and anus  · Avoid heavy lifting  This can cause straining and increase your risk for another hemorrhoid  When should I seek immediate care? · You have severe pain in your rectum or around your anus  · You have severe pain in your abdomen and you are vomiting  · You have bleeding from your anus that soaks through your underwear  When should I contact my healthcare provider? · You have frequent and painful bowel movements  · Your hemorrhoid looks or feels more swollen than usual      · You do not have a bowel movement for 2 days or more  · You see or feel tissue coming through your anus  · You have questions or concerns about your condition or care  CARE AGREEMENT:   You have the right to help plan your care  Learn about your health condition and how it may be treated  Discuss treatment options with your caregivers to decide what care you want to receive  You always have the right to refuse treatment  The above information is an  only  It is not intended as medical advice for individual conditions or treatments  Talk to your doctor, nurse or pharmacist before following any medical regimen to see if it is safe and effective for you  © 2017 [employer] [employer] Information is for End User's use only and may not be sold, redistributed or otherwise used for commercial purposes   All illustrations and images included in CareNotes® are the copyrighted property of [employer]  or [employer]

## 2020-07-29 NOTE — H&P
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[facility])      Past Surgical History:   Procedure Laterality Date    COLONOSCOPY       Social History   Social History     Substance and Sexual Activity   Alcohol Use Yes    Comment: 1-4 weekly     Social History     Substance and Sexual Activity   Drug Use No     Social History     Tobacco Use   Smoking Status Never Smoker   Smokeless Tobacco Never Used     History reviewed  No pertinent family history  Meds/Allergies       Current Outpatient Medications:     Ascorbic Acid 500 MG CAPS    multivitamin (THERAGRAN) TABS    Na Sulfate-K Sulfate-Mg Sulf (Suprep Bowel Prep Kit) 17 5-3 13-1 6 GM/177ML SOLN    Cholecalciferol 2000 units CAPS    doxazosin (CARDURA) 1 mg tablet    glucosamine-chondroitin 500-400 MG tablet    Current Facility-Administered Medications:     sodium chloride 0 9 % infusion, 50 mL/hr, Intravenous, Continuous    No Known Allergies    Objective     /86   Pulse 76   Temp 98 2 °F (36 8 °C) (Oral)   Resp 16   SpO2 96%     PHYSICAL EXAM    Gen: NAD AAOx3  CV: S1S2 RRR no m/r/g  CHEST: Clear b/l no c/r/w  ABD: soft, +BS NT/ND  EXT: no edema    ASSESSMENT/PLAN:  This is a 81y o  year old male here for surveillance colonoscopy, and he is stable and optimized for his procedure

## 2020-07-29 NOTE — ANESTHESIA PREPROCEDURE EVALUATION
Review of Systems/Medical History  Patient summary reviewed  Chart reviewed      Cardiovascular  Negative cardio ROS Hypertension controlled,   Comment: IHSS no symptoms,  Pulmonary  Negative pulmonary ROS        GI/Hepatic  Negative GI/hepatic ROS          Negative  ROS Prostatic disorder, prostatitis unspecified       Endo/Other  Negative endo/other ROS      GYN  Negative gynecology ROS          Hematology  Negative hematology ROS      Musculoskeletal  Negative musculoskeletal ROS        Neurology  Negative neurology ROS      Psychology   Negative psychology ROS              Physical Exam    Airway    Mallampati score: II  TM Distance: >3 FB  Neck ROM: full     Dental   No notable dental hx     Cardiovascular  Comment: Negative ROS, Rhythm: regular, Rate: normal, Cardiovascular exam normal    Pulmonary  Pulmonary exam normal Breath sounds clear to auscultation,     Other Findings        Anesthesia Plan  ASA Score- 2     Anesthesia Type- IV sedation with anesthesia with ASA Monitors  Additional Monitors:   Airway Plan:         Plan Factors-    Induction- intravenous  Postoperative Plan-     Informed Consent- Anesthetic plan and risks discussed with patient

## 2020-08-07 ENCOUNTER — TELEPHONE (OUTPATIENT)
Dept: CARDIOLOGY CLINIC | Facility: CLINIC | Age: 70
End: 2020-08-07

## 2020-08-07 NOTE — TELEPHONE ENCOUNTER
Sent by email from patient: forwarded to doctor  I am sending you a copy of my CT   scan from 7-  There are a few comments pertaining to my heart that I would like you to look at  A paragraph about the thoracic aorta, and the last paragraph mentions the abdominal aorta and iliac arteries  Please review the results and let me know your thoughts  2nd email from patient requesting response  Please call patient or advise

## 2020-09-03 ENCOUNTER — HOSPITAL ENCOUNTER (OUTPATIENT)
Dept: NON INVASIVE DIAGNOSTICS | Age: 70
Discharge: HOME | End: 2020-09-03
Payer: COMMERCIAL

## 2020-09-03 DIAGNOSIS — I10 ESSENTIAL HYPERTENSION: ICD-10-CM

## 2020-09-03 PROCEDURE — 93306 TTE W/DOPPLER COMPLETE: CPT

## 2020-09-03 PROCEDURE — 93306 TTE W/DOPPLER COMPLETE: CPT | Performed by: INTERNAL MEDICINE

## 2020-09-08 ENCOUNTER — TELEPHONE (OUTPATIENT)
Dept: NON INVASIVE DIAGNOSTICS | Facility: HOSPITAL | Age: 70
End: 2020-09-08

## 2021-03-19 ENCOUNTER — OFFICE VISIT (OUTPATIENT)
Dept: CARDIOLOGY CLINIC | Facility: CLINIC | Age: 71
End: 2021-03-19
Payer: COMMERCIAL

## 2021-03-19 ENCOUNTER — PROCEDURE VISIT (OUTPATIENT)
Dept: CARDIOLOGY CLINIC | Facility: CLINIC | Age: 71
End: 2021-03-19

## 2021-03-19 VITALS
SYSTOLIC BLOOD PRESSURE: 138 MMHG | WEIGHT: 145.8 LBS | BODY MASS INDEX: 22.1 KG/M2 | HEIGHT: 68 IN | DIASTOLIC BLOOD PRESSURE: 88 MMHG | HEART RATE: 77 BPM

## 2021-03-19 DIAGNOSIS — I42.1 HOCM (HYPERTROPHIC OBSTRUCTIVE CARDIOMYOPATHY) (HCC): ICD-10-CM

## 2021-03-19 DIAGNOSIS — I10 ESSENTIAL HYPERTENSION: Primary | ICD-10-CM

## 2021-03-19 DIAGNOSIS — R00.2 PALPITATIONS: ICD-10-CM

## 2021-03-19 DIAGNOSIS — I49.3 PVC (PREMATURE VENTRICULAR CONTRACTION): ICD-10-CM

## 2021-03-19 DIAGNOSIS — I49.3 PVC (PREMATURE VENTRICULAR CONTRACTION): Primary | ICD-10-CM

## 2021-03-19 PROCEDURE — 99214 OFFICE O/P EST MOD 30 MIN: CPT | Performed by: INTERNAL MEDICINE

## 2021-03-19 PROCEDURE — RECHECK: Performed by: INTERNAL MEDICINE

## 2021-03-19 NOTE — PROGRESS NOTES
48 Hour Holter Monitor Applied  Instructions given for diary, removal and return process  Pt verbalized understanding

## 2021-03-23 PROCEDURE — 93000 ELECTROCARDIOGRAM COMPLETE: CPT | Performed by: INTERNAL MEDICINE

## 2021-03-24 ENCOUNTER — HOSPITAL ENCOUNTER (OUTPATIENT)
Dept: NON INVASIVE DIAGNOSTICS | Facility: HOSPITAL | Age: 71
Discharge: HOME | End: 2021-03-24
Payer: COMMERCIAL

## 2021-03-24 DIAGNOSIS — R00.2 PALPITATIONS: ICD-10-CM

## 2021-03-24 DIAGNOSIS — I49.3 PVC (PREMATURE VENTRICULAR CONTRACTION): ICD-10-CM

## 2021-03-24 PROCEDURE — 93225 XTRNL ECG REC<48 HRS REC: CPT

## 2021-03-24 PROCEDURE — 93226 XTRNL ECG REC<48 HR SCAN A/R: CPT

## 2021-03-25 PROCEDURE — 93227 XTRNL ECG REC<48 HR R&I: CPT | Performed by: INTERNAL MEDICINE

## 2021-03-30 ENCOUNTER — TELEPHONE (OUTPATIENT)
Dept: NON INVASIVE DIAGNOSTICS | Facility: HOSPITAL | Age: 71
End: 2021-03-30

## 2021-03-30 DIAGNOSIS — Z23 ENCOUNTER FOR IMMUNIZATION: ICD-10-CM

## 2021-03-31 ENCOUNTER — CLINICAL SUPPORT (OUTPATIENT)
Dept: NEPHROLOGY | Facility: CLINIC | Age: 71
End: 2021-03-31

## 2021-03-31 VITALS
DIASTOLIC BLOOD PRESSURE: 96 MMHG | WEIGHT: 146.2 LBS | BODY MASS INDEX: 22.16 KG/M2 | HEIGHT: 68 IN | SYSTOLIC BLOOD PRESSURE: 154 MMHG | HEART RATE: 72 BPM

## 2021-03-31 DIAGNOSIS — I10 WHITE COAT SYNDROME WITH DIAGNOSIS OF HYPERTENSION: Primary | ICD-10-CM

## 2021-03-31 PROCEDURE — PBNCHG PB NO CHARGE PLACEHOLDER

## 2021-03-31 NOTE — PATIENT INSTRUCTIONS
Patient/parent/guardian briefed on responsibility form for safe keeping os ABPM machine and equipment  Patient/parent/ guardian signed responsibility form  Patient/parent/ guardian brief on instructions for ABPM use and BP log use and documentation  Patient/parent/ guardian verbally acknowledged understanding all instructions

## 2021-04-01 ENCOUNTER — CLINICAL SUPPORT (OUTPATIENT)
Dept: NEPHROLOGY | Facility: CLINIC | Age: 71
End: 2021-04-01
Payer: COMMERCIAL

## 2021-04-01 DIAGNOSIS — I10 WHITE COAT SYNDROME WITH DIAGNOSIS OF HYPERTENSION: Primary | ICD-10-CM

## 2021-04-01 PROCEDURE — 93784 AMBL BP MNTR W/SOFTWARE: CPT | Performed by: INTERNAL MEDICINE

## 2021-04-01 NOTE — PROGRESS NOTES
I have had the pleasure of interpreting a 24 hour ambulatory blood pressure monitor report performed on Jameel Cole from 3/31/2021 to 4/1/2021  There were 63 out of 65 successful readings obtained during that time (need 14 day, 7 night or 85% successful readings)  Of note, the patient had no obvious symptoms listed on his diary with the exception of stress during driving from 51:34 a m  to 11:00 a m    The results were as follows: This was an optimal study due to adequate time of monitoring  Sly Brooke average blood pressure reading was 137/86 with a heart rate of 81  The range was a minimum of 108/67 mm Hg and a maximum of 164/107 mm Hg  The Daytime average blood pressure reading was 143/90 mm Hg with a heart rate of 86, with 90 3% of the systolic readings greater than 135 mm Hg and 14 32% of the diastolic readings greater than 85 mm Hg  There was a significant systolic daytime blood pressure load and significant diastolic daytime blood pressure load  (>20% is significant)    The Nighttime average blood pressure reading was 126/76 with a heart rate of 70, with 82 8% of the systolic readings greater than 120 mm Hg and 40 98% of the diastolic readings greater than 70 mm Hg  There was significant systolic night time blood pressure load, significant diastolic night time blood pressure load  (>20% is significant)     The Mean Arterial Blood Pressure (MABP) nocturnal dipping was 13%  Impression:    Sly Brooke 24 hour ambulatory blood pressure monitor average reading was 137/86 mm Hg, with a daytime average blood pressure reading of 143/90 mm Hg and a night time average blood pressure reading of 126/76 mm Hg  The MABP nocturnal dipping was 13%    Whether your patient has hypertension requiring treatment or not should be individualized accordingly to the risk versus benefits of the treatment    The definition of hypertension can be found from multiple sources, with the more recent ACC/AHA guidelines from 2017 providing an update on blood pressure thresholds from prior guidelines  Recommendations: For those patients meeting hypertension criteria, further workup for end organ damage and secondary causes should be considered, as appropriate, in the overall evaluation and treatment of the patient  The decision to treat the patient with lifestyle modifications vs anti-hypertensive medications is based on the patient's ASCVD (Atherosclerotic Cardiovascular Disease) risk score, age and co-mordibities,  and thus must be individualized to the patient by the provider as per the AHA/ACC 2017 Guidelines  Please contact our Nephrology team if you need assistance in the management of the patient  Thank you for allowing me to participate in the care of your patient  If you have any questions or concerns, please do not hesitate to contact my office

## 2021-04-01 NOTE — PATIENT INSTRUCTIONS
-24 HR ABPM returned in working condition with all equipment   -Patient had no additional questions or concerns

## 2021-04-09 ENCOUNTER — TELEPHONE (OUTPATIENT)
Dept: CARDIOLOGY CLINIC | Facility: CLINIC | Age: 71
End: 2021-04-09

## 2021-04-09 DIAGNOSIS — I10 ESSENTIAL HYPERTENSION: Primary | ICD-10-CM

## 2021-04-09 NOTE — TELEPHONE ENCOUNTER
24 hour BP monitoring revealed a mean BP of 137/86 with a daily mean of 143/90  The patient meets criteria for HTN and needs to be started on treatment  Called patient to relay test results  There was no answer  Voicemail was left with instructions to call the office back

## 2021-04-19 RX ORDER — LOSARTAN POTASSIUM 50 MG/1
50 TABLET ORAL DAILY
Qty: 90 TABLET | Refills: 3 | Status: SHIPPED | OUTPATIENT
Start: 2021-04-19 | End: 2021-09-28 | Stop reason: ALTCHOICE

## 2021-04-19 NOTE — TELEPHONE ENCOUNTER
Dr Roxanne Dean  The patient called back and states he didn't get a call  Can you advise what the treatment is that you want him to start? I will call him again  Thanks

## 2021-04-19 NOTE — TELEPHONE ENCOUNTER
Dr Kelly Shannon,  I had a 2 day blood pressure monitor on March 31'st  I have not received any results from the test  Can someone contact me with the results?     Thank you   Norberto Olmos

## 2021-04-20 NOTE — TELEPHONE ENCOUNTER
I called Nidia Kim and advised him of the Losartan  He said he already picked up the medication  He asked when he is to f/u, I said call if b/p averaging 546 systolic or higher  Follow-up scheduled for Sept with Dr Cornelius Nogueira

## 2021-05-30 NOTE — PROGRESS NOTES
FYI   [facility] 3 Follow Up Visit - Cardiology   Karley Cardenas 78 y o  male MRN: [id number]  Unit/Bed#:  Encounter: [id number]      Active patient problems:    Patient Active Problem List    Diagnosis Date Noted    Hypertension 03/03/2020     Priority: Medium    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([facility] ) 03/03/2020     Priority: Low    H/O unilateral orchiectomy 03/03/2020     Priority: Low    Palpitations 03/03/2020     Priority: Low    Left ventricular hypertrophy by electrocardiogram 10/02/2018     Priority: Low    HOCM (hypertrophic obstructive cardiomyopathy) ([facility] ) 10/02/2018     Priority: Low       Plan: Mr Engel Husbands was seen last 3-3-2020  He was then evaluated by his oncologist and had CT scans on 3- and 7- for follow up of his seminoma and right external iliac node  Blood work done on 7- showed normal normal WBC (5 6k), HGB (15 6), HCT (45 2), PLT (213K), CREATININE (0 94), BUN (18), electrolytes and liver enzymes  The summary of the CT scan performed on 7-: Stable right external iliac lymph node; no evidence of metastatic disease in the chest, abdomen or pelvis; no acute or suspicious process in the chest, abdomen or pelvis  On September 3-2020 he had a transthoracic echocardiogram that showed Normal LV size and systolic function with an ejection fraction of 64%, grade 1 diastolic dysfunction, borderline atrial sizes and no significant valvular abnormality  An ECG done today is normal except for left atrial abnormality  He is complaining of very uncomfortable feelings with skipped beats and palpitation on a daily basis  He has also noted that his blood pressure has been elevated at home (systolic values between 692 and 140 mmHg  He is however no taking the bisoprolol due to side effects  Today, his blood pressure is 140/88 mmHg in the office   I have asked him to do a 48 hour ambulatory ECG monitor and a 24-hour blood pressure monitor to objectively assess his palpitation and hypertension  He has not yet decided to have vaccination for COVID  Physician Requesting Consult: Primary care  Reason for Consult / Principal Problem: Suspected hypertrophic cardiomyopathy     HPI: Bubba Calle is a 80 year old retired  who presented with complaints of palpitation and chest discomfort  The symptoms began in 2014 and triggered work up that included pharmacologic myocardial perfusion imaging (2014) and echocardiogram (2014) that were reportedly negative and a Holter monitor (2014) that showed premature atrial and ventricular premature beats that did not correlate with the timing of his symptoms (2014)  He continued to be symptomatic and was again evaluated in 2016 and 2018  Most recently, he had a 2-week ambulatory ECG monitor that also reportedly showed no high grade arrhythmias or significant pauses  He is healthy, physically fit with a BMI of 22 5 kg/m2, plays golf for 2 days and works part time for 3 days a week  His past medical history is significant for testicular cancer (seminoma) s/p right orchidectomy and recent diagnosis of prostate enlargement on a CT scan taken in follow up evaluation of his cancer  He does have symptoms of prostatism and in particular wakes up more than once at night to urinate  Mr Marquita Allen is quite concerned about his symptoms of palpitation, chest discomfort and occasional dizziness particularly after the hospitalization of her sister  He completed an echocardiogram on 10- that showed normal LV size and systolic function with an ejection fraction of 60% and no regional wall motion abnormalities; no evidence of LVH, no significant valvular abnormality and borderline increase in the size of the aortic root (3 7 cm)  He also had a treadmill exercise performed that showed excellent exercise tolerance (duration of exercise was 13 min and maximal work rate was 15 3 METs), reached a peak heart rate of 157 bpm (101 % of maximal predicted heart rate)  He was, however, hypertensive at baseline (162/80) with appropriate blood pressure response to stress  There was no chest pain during stress, the stress ECG was normal and no significant arrhythmias were noted (isolated premature ventricular beats)  His blood pressure was high during echocardiography as well  He does not add salt to his diet although he does eat out often, has maintained an ideal body weight and is quite active  He does have strong family history of hypertension  His lipid profile is being checked by his family physician  He was started on bisoprolol to treat high blood pressure and help with the sensation of skipped heart beats that was his main concern at that point       On 1/29/2019: Mr Brenda Gibson has done extremely well since last office visit  He has brought with him home blood pressure readings that range from 115 to 503 mmHg systolic and 70 to 80 mmHg diastolic  He denies chest pain, shortness of breath and dizziness and feels his palpitation has improved significantly  He exercises regularly and remains active, follows a heart healthy diet low in sodium and is compliant with his medications  I reviewed the medical records of his sister, Hank Bueno, who has been evaluated at the [facility] in [place] and appears to me that she has a similar problem likely caused by systemic hypertension  In her evaluation, much emphasis has been given to the reportedly sudden death of their father at age 43 and to Mr Calle's diagnosis of HCM  However, the circumstances of father's death is unclear and the contribution of excess alcohol intake may have been significant  Mr Calle's mild concentric left ventricular hypertrophy is also well explained by his systemic hypertension  Genetic testing at this point does not seem to be helpful considering the otherwise benign family history and mild phenotypic expression   His risk stratification is complete and he would not require further testing at this point      On 5/21/2019: Mr Joan Felipe has not tolerated the bisoprolol that was started for both hypertension and palpitation related to premature ventricular contractions  The medication was effective for both but resulted in insomnia, anxiety, chest pain, cold feet and hands, joint pain and most importantly problems getting and maintaining an erection  Even cutting the dose from 5 to 2 5 mg per day did not resolve the side effects and he stopped the medication altogether and noted rebound hypertension (140/90) and increased heart rate (resting heart rate of 90-95 bpm)  He was then started on losartan that has improved blood pressure control but has not done much for the rapid heart rate and palpitation  Today I have switched him from losartan to diltiazem hoping to be able to control both hypertension and palpitation  He will check his blood pressure on a daily basis and send me the results electronically  He has had a CT of chest with contrast on 3- outside the network that has reported mild dilation of the ascending aorta (4 1 cm)  He is scheduled to have a repeat study done next March  I have asked him to also let me know hoe diltiazem is working for his palpitation  If tolerated, it will be switched to once daily formulation of diltiazem      On 11/26/2019: Mr Dmitriy Hutchison has not been tolerating the diltiazem and has stopped taking it  He particularly had noticed problems with erectile dysfunction  His home blood pressure readings show values as high as 159/96 mmHg  Today in the clinic his blood pressure is 136/84 mmHg  He denies headaches but has noted that his palpitation has been quite worse after stopping the diltiazem  His cancer is under control and has not shown recurrence after surgery and his prostate problem is stable  He remains active and works part time   I have advised Mr Joan Felipe to continue medical therapy for his systemic hypertension and particularly do not stop the medication on his own  He was started on doxazocin today at a very small dose hoping that it would also help with his prostatism symptoms  I have also ordered an extended ambulatory monitor for him to evaluate his frequent and disabling symptom of palpitation  He will be emailing his blood pressure readings and let me know how he feels on the new medication in about a week  He has had blood work done 2 months ago that I do not have access to and he will be sending those to me as well  3-3-2020: Mr Analia Baker has been managing his hypertension with diet and exercise and has not taken any of his antihypertensive medications  He continues to complain of insomnia and nocturia due to benign prostate enlargement but chooses not to take the prescribed doxazocin  He is scheduled to have chest and abdomen CT to evaluate his ascending aortic aneurysm (41 mm) and the seminoma post surgical right orchidectomy  He will also see his oncologist after imaging studies are done  He will also have blood work including lipid profile  Ambulatory ECG monitor performed in December showed few isolated atrial and ventricular premature beats that did not correlate with the sensation of skipped beats  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  He also plays golf on  and   He is following a heart healthy diet and has kept a normal body mass index       Family history  Father  at age 43 suddenly for uncertain reason although he had drinking problem, mother  at age 80 (1) of stroke, she had dementia and bipolar disorder, a 71year old sister has had diabetes and has been diagnosed with "hypertrophic cardiomyopathy" after being taken to a hospital in [place] with chest pain and shortness of breath while running, a brother committed suicide by shooting himself at age 61 ()  He also had problem with excess alcohol intake   Mr Sia Hill has 2 children, a 40year old son with diabetes (on insulin pump) and a 45year old daughter without known health problem  He also has four grandchildren, 2 from each of his children  His son has a boy (13) and a girl (5) and his daughter has 2 boys and 1 girl (11and 1year old and a )  The grandchildren have had no heart problems and his daughter has gone through pregnancies without any issue  She is now pregnant and expecting to have a girl      Review of Systems: Complains of insomnia partly related to his nocturia caused by an enlarged prostate  Denies chest pain, shortness of breath, dizziness or syncope  Does have palpitations with skipped beats on a daily basis  Has not noted and leg edema or ankle swelling  Historical Information   Past Medical History:   Diagnosis Date    Hypertension     Prostatism     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[facility])      Past Surgical History:   Procedure Laterality Date    COLONOSCOPY       Social History     Substance and Sexual Activity   Alcohol Use Yes    Comment: 1-4 weekly     Social History     Substance and Sexual Activity   Drug Use No     Social History     Tobacco Use   Smoking Status Never Smoker   Smokeless Tobacco Never Used     Meds/Allergies   all current active meds have been reviewed  No Known Allergies     Current Outpatient Medications on File Prior to Visit   Medication Sig Dispense Refill    Ascorbic Acid 500 MG CAPS Take 1,000 mg by mouth 2 (two) times a day        Cholecalciferol 2000 units CAPS Take 1 capsule by mouth 3 (three) times a day        doxazosin (CARDURA) 1 mg tablet Take 1 tablet (1 mg total) by mouth daily at bedtime (Patient not taking: Reported on 3/3/2020) 30 tablet 0    glucosamine-chondroitin 500-400 MG tablet Take 1 tablet by mouth 3 (three) times a day      multivitamin (THERAGRAN) TABS Take 1 tablet by mouth daily        No current facility-administered medications on file prior to visit        Objective   Vitals:   Vitals:    21 0856 21 0927   BP: 140/88 138/88   BP Location: Right arm Right arm   Patient Position: Sitting    Cuff Size: Standard    Pulse: 77    Weight: 66 1 kg (145 lb 12 8 oz)    Height: 5' 8" (1 727 m)    Body mass index is 22 17 kg/m²  Body surface area is 1 79 meters squared  Invasive Devices     None               Physical Exam:  GEN: Misty Kruse appears well, alert and oriented x 3, pleasant and cooperative   HEENT: pupils equal, round, and reactive to light; extraocular muscles intact  NECK: supple, no carotid bruits   HEART: regular rhythm, normal S1 and S2, no murmurs, clicks, gallops or rubs   LUNGS: clear to auscultation bilaterally; no wheezes, rales, or rhonchi   ABDOMEN: normal bowel sounds, soft, no tenderness, no distention  EXTREMITIES: peripheral pulses normal; no clubbing, cyanosis, or edema  NEURO: no focal findings   SKIN: normal without suspicious lesions on exposed skin    Lab Results:   No visits with results within 1 Day(s) from this visit  Latest known visit with results is:   Orders Only on 07/19/2020   Component Date Value    SARS-CoV-2  07/19/2020 Not Detected     Inpatient 07/19/2020 Comment        Imaging: I have personally reviewed pertinent reports  Imaging Results  - documented in this encounter    CT CHEST ABDOMEN AND PELVIS W CONTRAST (03/27/2020 8:12 AM EDT)  CT CHEST ABDOMEN AND PELVIS W CONTRAST (03/27/2020 8:12 AM EDT)   Specimen         CT CHEST ABDOMEN AND PELVIS W CONTRAST (03/27/2020 8:12 AM EDT)   Impressions Performed At   Impression:     Interval increase in size of borderline right pelvic lymph node   Otherwise    unchanged                         Workstation:[id number]   RADIOLOGY       CT CHEST ABDOMEN AND PELVIS W CONTRAST (03/27/2020 8:12 AM EDT)   Narrative Performed At   History: Seminoma of testis, stage I          Exam: CT of the chest, abdomen, and pelvis with with oral and IV contrast          Technique: Using helical technique, axial images were obtained through the    chest, abdomen, and pelvis following administration of 100 cc of Omnipaque    350 intravenous contrast  Coronal and sagittal reformations were performed          Comparison: 3/25/2017 CT          Chest CT      Lungs/Pleura: No confluent airspace disease, mass, or suspicious nodule         Heart/Vessels: Heart normal size without pericardial effusion  Ascending    thoracic aorta measures 4 cm in maximal diameter  Central pulmonary arteries    enhance normally  Cecilio Crazier is coronary arterial calcification         Mediastinum/Lymph nodes: No suspiciously enlarged mediastinal, hilar,    supraclavicular or axillary nodes  Imaged thyroid grossly normal           Chest Wall/Bones: Intact           Abdomen:     Liver: Within normal limits         Gallbladder/Bile ducts: Within normal limits         Pancreas: Within normal limits         Spleen: Within normal limits         Adrenal glands: Within normal limits         Kidneys/Ureters: Symmetric nephrograms  No hydronephrosis or calcified stones         Vessels: Minimal abdominal aortic atherosclerosis without aneurysm         Lymph nodes: No suspiciously enlarged nodes         Bowel/Mesentery/Peritoneum: No obstruction   No mesenteric adenopathy         Abdominal Wall: Intact without bowel containing hernia          Pelvis:    Prostate measures 4 7 cm transversely     Small left hydrocele, partially imaged         Urinary Bladder: Within normal limits         Lymph nodes:  Slight increase in size of the right pelvic node measuring 10 x 12    mm (previously 6 x 8), lying just posterior to the external iliac vein          Bones: No destructive lesions or acute displaced fractures        Interface, Rad Results In - 03/27/2020 8:52 AM EDT    Formatting of this note might be different from the original   History: Seminoma of testis, stage I      Exam: CT of the chest, abdomen, and pelvis with with oral and IV contrast      Technique: Using helical technique, axial images were obtained through the  chest, abdomen, and pelvis following administration of 100 cc of Omnipaque  350 intravenous contrast  Coronal and sagittal reformations were performed      Comparison: 3/25/2017 CT      Chest CT  Lungs/Pleura: No confluent airspace disease, mass, or suspicious nodule  Heart/Vessels: Heart normal size without pericardial effusion  Ascending  thoracic aorta measures 4 cm in maximal diameter  Central pulmonary arteries  enhance normally  There is coronary arterial calcification  Mediastinum/Lymph nodes: No suspiciously enlarged mediastinal, hilar,  supraclavicular or axillary nodes  Imaged thyroid grossly normal      Chest Wall/Bones: Intact       Abdomen:   Liver: Within normal limits  Gallbladder/Bile ducts: Within normal limits  Pancreas: Within normal limits  Spleen: Within normal limits  Adrenal glands: Within normal limits  Kidneys/Ureters: Symmetric nephrograms  No hydronephrosis or calcified stones  Vessels: Minimal abdominal aortic atherosclerosis without aneurysm  Lymph nodes: No suspiciously enlarged nodes  Bowel/Mesentery/Peritoneum: No obstruction  No mesenteric adenopathy  Abdominal Wall: Intact without bowel containing hernia      Pelvis:  Prostate measures 4 7 cm transversely  Small left hydrocele, partially imaged  Urinary Bladder: Within normal limits  Lymph nodes: Slight increase in size of the right pelvic node measuring 10 x 12  mm (previously 6 x 8), lying just posterior to the external iliac vein      Bones: No destructive lesions or acute displaced fractures  IMPRESSION:  Impression:   Interval increase in size of borderline right pelvic lymph node  Otherwise  unchanged      Imaging Results  - documented in this encounter    CT CHEST ABDOMEN AND PELVIS W CONTRAST (07/27/2020 8:22 AM EDT)  CT CHEST ABDOMEN AND PELVIS W CONTRAST (07/27/2020 8:22 AM EDT)   Specimen         CT CHEST ABDOMEN AND PELVIS W CONTRAST (07/27/2020 8:22 AM EDT)   Impressions Performed At   IMPRESSION:        1  Stable right external iliac lymph node as noted     2  No evidence of metastatic disease in the chest, abdomen or pelvis     3  No acute or suspicious process in the chest, abdomen or pelvis     4  Other findings as noted  Please see above findings                     Workstation:[id number]   RADIOLOGY       CT CHEST ABDOMEN AND PELVIS W CONTRAST (07/27/2020 8:22 AM EDT)   Narrative Performed At   Examination: CT scan of the chest, abdomen and pelvis with contrast        Comparisons: Prior examinations dated 3/27/2020 back to 3/25/2019         INDICATION: 65-year-old male with a history of seminoma  Follow-up         TECHNIQUE: A CT scan of the chest, abdomen and pelvis with intravenous and oral    contrast is performed in the axial plane from the base of the neck through the    pelvis  100 cc of Omnipaque 350 is administered intravenously         FINDINGS: Evaluation of the lungs demonstrates a linear parenchymal opacity in    the left lower lobe with characteristics of a linear parenchymal scar or linear    subsegmental atelectasis  The lungs are otherwise clear  No pulmonary    parenchymal nodules or masses are noted  The vascular markings are normal  No    pleural effusions are noted  The trachea and mainstem bronchi are widely patent         There are no pathologically enlarged axillary, mediastinal or hilar lymph nodes     The base of the neck is unremarkable  The visualized thyroid gland is normal     The esophagus is grossly unremarkable         There is mild calcific atherosclerosis of the thoracic aorta and coronary    arteries  There is mild fusiform dilatation of the ascending thoracic aorta    measuring 3 9 x 4 2 cm previously measuring 4 0 cm in maximal dimension  Louann Peper   is no evidence of a pericardial effusion   The heart is not enlarged         Evaluation of the abdomen and pelvis demonstrates no evidence of a focal liver  lesion  No biliary ductal dilatation is noted  The gallbladder is unremarkable    by CT criteria         The pancreas, the spleen and adrenal glands appear stable and unremarkable  The    kidneys function symmetrically without evidence of renal calculi or obstructive    uropathy in either kidney  No renal masses are noted  The urinary bladder is    unremarkable  The prostate gland is mildly enlarged for which clinical    correlation is recommended         The stomach is grossly normal  The small bowel is normal in caliber and    unremarkable  Evaluation of the colon demonstrates no findings of a focal    colonic lesion  There are no acute inflammatory changes noted in the abdomen or    pelvis         There is normal density throughout the abdominal and pelvic fat  No free    fluid/ascites is noted         Again there is a right external iliac lymph node measuring 10 x 12 mm previously    measuring 10 x 12 mm and prior to that 8 x 6 mm  No other measurable lymph nodes    are noted in the abdomen or pelvis         There is mild calcific atherosclerosis of the abdominal aorta and iliac arteries    without evidence of an aneurysm  The abdominal and pelvic vasculature is    otherwise unremarkable         The body wall soft tissues are intact and unremarkable  Evaluation the osseous    structures demonstrates no lytic or destructive lesions  Mild skeletal    degenerative changes are noted        RADIOLOGY       CT CHEST ABDOMEN AND PELVIS W CONTRAST (07/27/2020 8:22 AM EDT)   Procedure Note   Interface, Rad Results In - 07/27/2020 10:56 AM EDT    Examination: CT scan of the chest, abdomen and pelvis with contrast    Comparisons: Prior examinations dated 3/27/2020 back to 3/25/2019  INDICATION: 79-year-old male with a history of seminoma  Follow-up  TECHNIQUE: A CT scan of the chest, abdomen and pelvis with intravenous and oral  contrast is performed in the axial plane from the base of the neck through the  pelvis  100 cc of Omnipaque 350 is administered intravenously  FINDINGS: Evaluation of the lungs demonstrates a linear parenchymal opacity in  the left lower lobe with characteristics of a linear parenchymal scar or linear  subsegmental atelectasis  The lungs are otherwise clear  No pulmonary  parenchymal nodules or masses are noted  The vascular markings are normal  No  pleural effusions are noted  The trachea and mainstem bronchi are widely patent  There are no pathologically enlarged axillary, mediastinal or hilar lymph nodes  The base of the neck is unremarkable  The visualized thyroid gland is normal   The esophagus is grossly unremarkable  There is mild calcific atherosclerosis of the thoracic aorta and coronary  arteries  There is mild fusiform dilatation of the ascending thoracic aorta  measuring 3 9 x 4 2 cm previously measuring 4 0 cm in maximal dimension  There  is no evidence of a pericardial effusion  The heart is not enlarged  Evaluation of the abdomen and pelvis demonstrates no evidence of a focal liver  lesion  No biliary ductal dilatation is noted  The gallbladder is unremarkable  by CT criteria  The pancreas, the spleen and adrenal glands appear stable and unremarkable  The  kidneys function symmetrically without evidence of renal calculi or obstructive  uropathy in either kidney  No renal masses are noted  The urinary bladder is  unremarkable  The prostate gland is mildly enlarged for which clinical  correlation is recommended  The stomach is grossly normal  The small bowel is normal in caliber and  unremarkable  Evaluation of the colon demonstrates no findings of a focal  colonic lesion  There are no acute inflammatory changes noted in the abdomen or  pelvis  There is normal density throughout the abdominal and pelvic fat  No free  fluid/ascites is noted      Again there is a right external iliac lymph node measuring 10 x 12 mm previously  measuring 10 x 12 mm and prior to that 8 x 6 mm  No other measurable lymph nodes  are noted in the abdomen or pelvis  There is mild calcific atherosclerosis of the abdominal aorta and iliac arteries  without evidence of an aneurysm  The abdominal and pelvic vasculature is  otherwise unremarkable  The body wall soft tissues are intact and unremarkable  Evaluation the osseous  structures demonstrates no lytic or destructive lesions  Mild skeletal  degenerative changes are noted  IMPRESSION:  IMPRESSION:    1  Stable right external iliac lymph node as noted  2  No evidence of metastatic disease in the chest, abdomen or pelvis  3  No acute or suspicious process in the chest, abdomen or pelvis  4  Other findings as noted  Please see above findings  Echo complete with contrast if indicated  Status: Final result   PACS Images     Show images for Echo complete with contrast if indicated   Study Result    [address]  [address]     Transthoracic Echocardiogram  2D, M-mode, Doppler, and Color Doppler     Study date:  03-Sep-2020     Patient: Juana Alberto  MR number: [id number]  Account number: [de-identified]  : 1950  Age: 81 years  Gender: Male  Status: Outpatient  Location: [facility] [place]  Height: 68 in  Weight: 141 7 lb  BP: 119/ 76 mmHg     Indications: Essential Hypertension     Diagnoses: I10  - Essential (primary) hypertension     Sonographer:  Bettye Daniel RDCS  Primary Physician:  Pal Mcnamara MD  Referring Physician:  Mona Keyes MD  Group:  [facility]  Interpreting Physician:  Ishan Morris DO     SUMMARY     LEFT VENTRICLE:  Systolic function was normal  Ejection fraction was estimated to be 60 %  There were no regional wall motion abnormalities    Doppler parameters were consistent with abnormal left ventricular relaxation (grade 1 diastolic dysfunction)      RIGHT ATRIUM:  The right atrial size was at the upper limits of normal to mildly dilated      AORTIC VALVE:  There was trace regurgitation      TRICUSPID VALVE:  There was trace regurgitation      COMPARISONS:  The previous study was not available for direct comparison      HISTORY: PRIOR HISTORY: Hypertension, Hypertrophic Obstructive Cardiomyopathy, Palpitations     PROCEDURE: The study was performed in the [facility] [place]  This was a routine study  The transthoracic approach was used  The study included complete 2D imaging, M-mode, complete spectral Doppler, and color Doppler  The heart rate was 76 bpm, at the start of the study  Images were obtained from the parasternal, apical, subcostal, and suprasternal notch acoustic windows  Image quality was adequate      LEFT VENTRICLE: Size was normal  Systolic function was normal  Ejection fraction was estimated to be 60 %  There were no regional wall motion abnormalities  Wall thickness was normal  DOPPLER: Doppler parameters were consistent with  abnormal left ventricular relaxation (grade 1 diastolic dysfunction)      RIGHT VENTRICLE: The size was normal  Systolic function was normal  Wall thickness was normal      LEFT ATRIUM: Size was normal      RIGHT ATRIUM: The right atrial size was at the upper limits of normal to mildly dilated      MITRAL VALVE: Valve structure was normal  There was normal leaflet separation  DOPPLER: The transmitral velocity was within the normal range  There was no evidence for stenosis  There was no regurgitation      AORTIC VALVE: The valve was trileaflet  Leaflets exhibited normal thickness and normal cuspal separation  DOPPLER: Transaortic velocity was within the normal range  There was no evidence for stenosis  There was trace regurgitation      TRICUSPID VALVE: The valve structure was normal  There was normal leaflet separation  DOPPLER: The transtricuspid velocity was within the normal range  There was no evidence for stenosis   There was trace regurgitation      PULMONIC VALVE: Not well visualized  DOPPLER: The transpulmonic velocity was within the normal range  There was no significant regurgitation      PERICARDIUM: There was no pericardial effusion  The pericardium was normal in appearance      SYSTEMIC VEINS: IVC: The inferior vena cava was normal in size and course  Respirophasic changes were normal      SYSTEM MEASUREMENT TABLES     2D  %FS: 34 06 %  Ao Diam: 2 63 cm  EDV(Teich): 61 93 ml  EF(Teich): 63 77 %  ESV(Cube): 15 72 ml  ESV(Teich): 22 44 ml  IVC: 18 21 mm  IVSd: 0 91 cm  LA Area: 11 61 cm2  LA Diam: 2 56 cm  LVEDV MOD A4C: 65 34 ml  LVEF MOD A4C: 61 53 %  LVESV MOD A4C: 25 14 ml  LVIDd: 3 8 cm  LVIDs: 2 51 cm  LVLd A4C: 8 69 cm  LVLs A4C: 7 44 cm  LVOT Diam: 1 96 cm  LVPWd: 1 1 cm  RA Area: 18 48 cm2  RV Diam : 4 09 cm  SI(Cube): 22 11 ml/m2  SI(Teich): 22 31 ml/m2  SV MOD A4C: 40 2 ml  SV(Cube): 39 13 ml  SV(Teich): 39 49 ml     CW  TR Vmax: 2 04 m/s  TR maxP 63 mmHg     MM  TAPSE: 1 99 cm     PW  E': 0 08 m/s  E/E': 7 46  MV A Clarke: 0 64 m/s  MV Dec Cowlitz: 2 58 m/s2  MV DecT: 221 88 ms  MV E Clarke: 0 57 m/s  MV E/A Ratio: 0 89     [facility] Accredited Echocardiography Laboratory     Prepared and electronically signed by  Kyle Lara DO  Signed 03-Sep-2020 17:07:14        EKG: NSR, Left atrial enlargement    Counseling / Coordination of Care  Total floor / unit time spent today 40 minutes  Greater than 50% of total time was spent with the patient and / or family counseling and / or coordination of care

## 2021-06-14 RX ORDER — LOSARTAN POTASSIUM 25 MG/1
25 TABLET ORAL DAILY
COMMUNITY
End: 2021-09-28 | Stop reason: ALTCHOICE

## 2021-06-24 ENCOUNTER — CONSULT (OUTPATIENT)
Dept: DERMATOLOGY | Facility: CLINIC | Age: 71
End: 2021-06-24
Payer: COMMERCIAL

## 2021-06-24 VITALS — WEIGHT: 144 LBS | TEMPERATURE: 97.5 F | BODY MASS INDEX: 21.82 KG/M2 | HEIGHT: 68 IN

## 2021-06-24 DIAGNOSIS — D22.70 MULTIPLE BENIGN NEVI OF UPPER EXTREMITY, LOWER EXTREMITY, AND TRUNK: ICD-10-CM

## 2021-06-24 DIAGNOSIS — L81.4 SOLAR LENTIGO: ICD-10-CM

## 2021-06-24 DIAGNOSIS — L82.1 SEBORRHEIC KERATOSIS: ICD-10-CM

## 2021-06-24 DIAGNOSIS — D22.60 MULTIPLE BENIGN NEVI OF UPPER EXTREMITY, LOWER EXTREMITY, AND TRUNK: ICD-10-CM

## 2021-06-24 DIAGNOSIS — D18.01 CHERRY ANGIOMA: Primary | ICD-10-CM

## 2021-06-24 DIAGNOSIS — M71.30 MYXOID CYST: ICD-10-CM

## 2021-06-24 DIAGNOSIS — D48.5 NEOPLASM OF UNCERTAIN BEHAVIOR OF SKIN: ICD-10-CM

## 2021-06-24 DIAGNOSIS — D22.5 MULTIPLE BENIGN NEVI OF UPPER EXTREMITY, LOWER EXTREMITY, AND TRUNK: ICD-10-CM

## 2021-06-24 PROCEDURE — 99204 OFFICE O/P NEW MOD 45 MIN: CPT | Performed by: DERMATOLOGY

## 2021-06-24 NOTE — PROGRESS NOTES
[facility] Note     Patient Name: Nidia Olivo  Encounter Date: 06/24/2021     Have you been cared for by a [facility] Dermatologist in the last 3 years and, if so, which one? No    · Have you traveled outside of the [address] [place] [address] in the past 3 months or outside of the [facility] area in the last 2 weeks? No     May we call your Preferred Phone number to discuss your specific medical information? Yes     May we leave a detailed message that includes your specific medical information? Yes      Today's Chief Concerns:   Concern #1:  Skin check   Concern #2:      Past Medical History:  Have you personally ever had or currently have any of the following? · Skin cancer (such as Melanoma, Basal Cell Carcinoma, Squamous Cell Carcinoma? (If Yes, please provide more detail)- No  · Eczema: No  · Psoriasis: No  · HIV/AIDS: No  · Hepatitis B or C: No  · Tuberculosis: No  · Systemic Immunosuppression such as Diabetes, Biologic or Immunotherapy, Chemotherapy, Organ Transplantation, Bone Marrow Transplantation (If YES, please provide more detail): No  · Radiation Treatment (If YES, please provide more detail): No  · Any other major medical conditions/concerns? (If Yes, which types)- No    Social History:     What is/was your primary occupation? retired     What are your hobbies/past-times? Family History:  Have any of your "first degree relatives" (parent, brother, sister, or child) had any of the following       · Skin cancer such as Melanoma or Merkel Cell Carcinoma or Pancreatic Cancer? No  · Eczema, Asthma, Hay Fever or Seasonal Allergies: No  · Psoriasis or Psoriatic Arthritis: No  · Do any other medical conditions seem to run in your family? If Yes, what condition and which relatives?   No    Current Medications:   (please update all dermatological medications before printing patient's AVS!)      Current Outpatient Medications:     Ascorbic Acid 500 MG CAPS, Take 1,000 mg by mouth 2 (two) times a day  , Disp: , Rfl:     Cholecalciferol 2000 units CAPS, Take 1 capsule by mouth 3 (three) times a day  , Disp: , Rfl:     multivitamin (THERAGRAN) TABS, Take 1 tablet by mouth daily , Disp: , Rfl:     doxazosin (CARDURA) 1 mg tablet, Take 1 tablet (1 mg total) by mouth daily at bedtime (Patient not taking: Reported on 3/3/2020), Disp: 30 tablet, Rfl: 0    glucosamine-chondroitin 500-400 MG tablet, Take 1 tablet by mouth 3 (three) times a day (Patient not taking: Reported on 6/24/2021), Disp: , Rfl:     losartan (COZAAR) 25 mg tablet, Take 25 mg by mouth daily (Patient not taking: Reported on 6/24/2021), Disp: , Rfl:     losartan (COZAAR) 50 mg tablet, Take 1 tablet (50 mg total) by mouth daily (Patient not taking: Reported on 6/24/2021), Disp: 90 tablet, Rfl: 3      Review of Systems:  Have you recently had or currently have any of the following? If YES, what are you doing for the problem? · Fever, chills or unintended weight loss: No  · Sudden loss or change in your vision: No  · Nausea, vomiting or blood in your stool: No  · Painful or swollen joints: No  · Wheezing or cough: No  · Changing mole or non-healing wound: No  · Nosebleeds: No  · Excessive sweating: No  · Easy or prolonged bleeding? No  · Over the last 2 weeks, how often have you been bothered by the following problems? · Taking little interest or pleasure in doing things: 1 - Not at All  · Feeling down, depressed, or hopeless: 1 - Not at All  · Rapid heartbeat with epinephrine:  No    · FEMALES ONLY:    · Are you pregnant or planning to become pregnant? N/A  · Are you currently or planning to be nursing or breast feeding? N/A    · Any known allergies?       · No Known Allergies      Physical Exam:     Was a chaperone (Derm Clinical Assistant) present throughout the entire Physical Exam? Yes     Did the Dermatology Team specifically  the patient on the importance of a Full Skin Exam to be sure that nothing is missed clinically? Yes}  o Did the patient ultimately request or accept a Full Skin Exam?  Yes  o Did the patient specifically refuse to have the areas "under-the-underwear" examined by the Dermatologist? No    CONSTITUTIONAL:   Vitals:    06/24/21 1612   Temp: 97 5 °F (36 4 °C)   TempSrc: Tympanic   Weight: 65 3 kg (144 lb)   Height: 5' 8" (1 727 m)         PSYCH: Normal mood and affect  EYES: Normal conjunctiva  ENT: Normal lips and oral mucosa  CARDIOVASCULAR: No edema  RESPIRATORY: Normal respirations  HEME/LYMPH/IMMUNO:  No regional lymphadenopathy except as noted below in "ASSESSMENT AND PLAN BY DIAGNOSIS"    SKIN:  FULL ORGAN SYSTEM EXAM   Hair, Scalp, Ears, Face Normal except as noted below in Assessment   Neck, Cervical Chain Nodes Normal except as noted below in Assessment   Right Arm/Hand/Fingers Normal except as noted below in Assessment   Left Arm/Hand/Fingers Normal except as noted below in Assessment   Chest/Breasts/Axillae Viewed areas Normal except as noted below in Assessment   Abdomen, Umbilicus Normal except as noted below in Assessment   Back/Spine Normal except as noted below in Assessment   Groin/Genitalia/Buttocks Normal except as noted below in Assessment   Right Leg, Foot, Toes Normal except as noted below in Assessment   Left Leg, Foot, Toes Normal except as noted below in Assessment        Assessment and Plan by Diagnosis:    History of Present Condition:years     Duration:  How long has this been an issue for you?    o  unsure   Location Affected:  Where on the body is this affecting you?    o  right 2nd toe   Quality:  Is there any bleeding, pain, itch, burning/irritation, or redness associated with the skin lesion?    o  Denies   Severity:  Describe any bleeding, pain, itch, burning/irritation, or redness on a scale of 1 to 10 (with 10 being the worst)    o  1   Timing:  Does this condition seem to be there pretty constantly or do you notice it more at specific times throughout the day?    o  Denies   Context:  Have you ever noticed that this condition seems to be associated with specific activities you do?    o  Denies   Modifying Factors:    o Anything that seems to make the condition worse?    -  Denies  o What have you tried to do to make the condition better? -  Denies   Associated Signs and Symptoms:  Does this skin lesion seem to be associated with any of the followin  CHERRY ANGIOMAS     Physical Exam:  · Anatomic Location Affected:  Trunk and extremites  · Morphological Description:  Scattered cherry red papules  · Denies pain, itch, bleeding  No treatments tried  Present for years  Present constantly; no modifying factors which make it worse or better  Assessment and Plan:  Based on a thorough discussion of this condition and the management approach to it (including a comprehensive discussion of the known risks, side effects and potential benefits of treatment), the patient (family) agrees to implement the following specific plan:  · Reassure benign        SEBORRHEIC KERATOSIS; NON-INFLAMED     Physical Exam:  · Anatomic Location Affected:  Trunk and extremities  · Morphological Description:  Waxy, smooth to warty textured, yellow to brownish-grey to dark brown to blackish, discrete, "stuck-on" appearing papules  · Present for years  Denies pain, itch, bleeding  Additional History of Present Condition:  Present constantly; no modifying factors which make it worse or better  No prior treatment  Assessment and Plan:  Based on a thorough discussion of this condition and the management approach to it (including a comprehensive discussion of the known risks, side effects and potential benefits of treatment), the patient (family) agrees to implement the following specific plan:  · Reassure benign  · Use sun protection  Apply SPF 30 or higher at least three times a day  Wear sun protecting clothing and hats          SOLAR LENTIGINES Physical Exam:   Anatomic Location Affected:  Sun exposed areas of back, chest, arms, legs   Morphological Description:  Multiple scattered brown to tan evenly pigmented macules    Denies pain, itch, bleeding  No treatments tried  Present for months - years  Reports getting newer lesions with sun exposure  Assessment and Plan:  Based on a thorough discussion of this condition and the management approach to it (including a comprehensive discussion of the known risks, side effects and potential benefits of treatment), the patient (family) agrees to implement the following specific plan:  · Reassure benign  · Use sun protection  Apply SPF 30 or higher at least three times a day  Wear sun protecting clothing and hats  MULTIPLE MELANOCYTIC NEVI ("Moles")     Physical Exam:  · Anatomic Location Affected: Trunk and extremities  · Morphological Description:  Scattered, round to ovoid, symmetrical-appearing, even bordered, skin colored to dark brown macules/papules  · Denies pain, itch, bleeding  No treatments tried  Present for years  Present constantly; no modifying factors which make it worse or better  Denies actively changing or growing moles  Assessment and Plan:  Based on a thorough discussion of this condition and the management approach to it (including a comprehensive discussion of the known risks, side effects and potential benefits of treatment), the patient (family) agrees to implement the following specific plan:  · Reassure benign  · Monitor for changes  · Use sun protection  Apply SPF 30 or higher at least three times a day  Wear sun protecting clothing and hats  Worrisome signs of skin malignancy discussed, questions answered  Regular self-skin check discussed  Advised to call or return to office if patient notices any spots of concern, rapidly growing/changing lesions, bleeding lesions, non-healing lesions  Advised regular SPF use       5  MYXOID CYST    Physical Exam:   Anatomic Location Affected:  Right second toe at toenail   Morphological Description:  Fluid filled round papule in a patient with arthritis    Denies pain, itch, bleeding  No treatments tried  Assessment and Plan:  Based on a thorough discussion of this condition and the management approach to it (including a comprehensive discussion of the known risks, side effects and potential benefits of treatment), the patient (family) agrees to implement the following specific plan:   Patient deferred treatment for now    6   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    Physical Exam:   Anatomic Location Affected:  Right upper  Lateral thigh   Morphological Description:  Subcutaneous fullness, ill defined    Additional History of Present Condition:  Present for 1 year    Assessment and Plan:  Based on a thorough discussion of this condition and the management approach to it (including a comprehensive discussion of the known risks, side effects and potential benefits of treatment), the patient (family) agrees to implement the following specific plan:   Order soft tissue ultrasound    Scribe Attestation    I,:  Darcy Jauregui MA am acting as a scribe while in the presence of the attending physician :       I,:  Sonido Andrade MD personally performed the services described in this documentation    as scribed in my presence :

## 2021-06-24 NOTE — PATIENT INSTRUCTIONS
1  CHERRY ANGIOMAS     Physical Exam:  · Anatomic Location Affected:  Trunk and extremites     Assessment and Plan:  Based on a thorough discussion of this condition and the management approach to it (including a comprehensive discussion of the known risks, side effects and potential benefits of treatment), the patient (family) agrees to implement the following specific plan:  · Reassure benign        SEBORRHEIC KERATOSIS; NON-INFLAMED     Physical Exam:  · Anatomic Location Affected:  Trunk and extremities     Additional History of Present Condition:  Present constantly; no modifying factors which make it worse or better  No prior treatment  Assessment and Plan:  Based on a thorough discussion of this condition and the management approach to it (including a comprehensive discussion of the known risks, side effects and potential benefits of treatment), the patient (family) agrees to implement the following specific plan:  · Reassure benign  · Use sun protection  Apply SPF 30 or higher at least three times a day  Wear sun protecting clothing and hats  SOLAR LENTIGINES      Physical Exam:   Anatomic Location Affected:  Sun exposed areas of back, chest, arms, legs      Assessment and Plan:  Based on a thorough discussion of this condition and the management approach to it (including a comprehensive discussion of the known risks, side effects and potential benefits of treatment), the patient (family) agrees to implement the following specific plan:  · Reassure benign  · Use sun protection  Apply SPF 30 or higher at least three times a day  Wear sun protecting clothing and hats           MULTIPLE MELANOCYTIC NEVI ("Moles")     Physical Exam:  · Anatomic Location Affected: Trunk and extremities     Assessment and Plan:  Based on a thorough discussion of this condition and the management approach to it (including a comprehensive discussion of the known risks, side effects and potential benefits of treatment), the patient (family) agrees to implement the following specific plan:  · Reassure benign  · Monitor for changes  · Use sun protection  Apply SPF 30 or higher at least three times a day  Wear sun protecting clothing and hats  Worrisome signs of skin malignancy discussed, questions answered  Regular self-skin check discussed  Advised to call or return to office if patient notices any spots of concern, rapidly growing/changing lesions, bleeding lesions, non-healing lesions  Advised regular SPF use  5  MYXOID CYST    Physical Exam:   Anatomic Location Affected:  Right second toe at toenail    Assessment and Plan:  Based on a thorough discussion of this condition and the management approach to it (including a comprehensive discussion of the known risks, side effects and potential benefits of treatment), the patient (family) agrees to implement the following specific plan:   Reassure benign    If becomes bothersome can be surgically removed      6   LIPOMA    Physical Exam:   Anatomic Location Affected:  Right upper  Lateral thigh    Assessment and Plan:  Based on a thorough discussion of this condition and the management approach to it (including a comprehensive discussion of the known risks, side effects and potential benefits of treatment), the patient (family) agrees to implement the following specific plan:   Order soft tissue ultrasound

## 2021-07-01 ENCOUNTER — HOSPITAL ENCOUNTER (OUTPATIENT)
Dept: ULTRASOUND IMAGING | Facility: HOSPITAL | Age: 71
Discharge: HOME | End: 2021-07-01
Attending: DERMATOLOGY
Payer: COMMERCIAL

## 2021-07-01 DIAGNOSIS — R22.41 LUMP OF RIGHT THIGH: ICD-10-CM

## 2021-07-01 PROCEDURE — 76882 US LMTD JT/FCL EVL NVASC XTR: CPT

## 2021-07-14 ENCOUNTER — TELEPHONE (OUTPATIENT)
Dept: DERMATOLOGY | Facility: CLINIC | Age: 71
End: 2021-07-14

## 2021-07-14 NOTE — TELEPHONE ENCOUNTER
----- Message from Monika Granda MD sent at 7/14/2021  3:04 PM EDT -----  Let patient know ultrasound consistent with lipoma

## 2021-09-28 ENCOUNTER — OFFICE VISIT (OUTPATIENT)
Dept: CARDIOLOGY CLINIC | Facility: CLINIC | Age: 71
End: 2021-09-28
Payer: COMMERCIAL

## 2021-09-28 VITALS
HEART RATE: 83 BPM | SYSTOLIC BLOOD PRESSURE: 112 MMHG | WEIGHT: 146.9 LBS | DIASTOLIC BLOOD PRESSURE: 60 MMHG | HEIGHT: 68 IN | BODY MASS INDEX: 22.26 KG/M2

## 2021-09-28 DIAGNOSIS — Z85.47 H/O TESTICULAR CANCER: ICD-10-CM

## 2021-09-28 DIAGNOSIS — I42.1 HOCM (HYPERTROPHIC OBSTRUCTIVE CARDIOMYOPATHY) (HCC): ICD-10-CM

## 2021-09-28 DIAGNOSIS — I10 ESSENTIAL HYPERTENSION: Primary | ICD-10-CM

## 2021-09-28 DIAGNOSIS — R00.2 PALPITATIONS: ICD-10-CM

## 2021-09-28 PROCEDURE — 99214 OFFICE O/P EST MOD 30 MIN: CPT | Performed by: INTERNAL MEDICINE

## 2021-09-28 RX ORDER — LOSARTAN POTASSIUM 25 MG/1
25 TABLET ORAL DAILY
Qty: 90 TABLET | Refills: 3 | Status: SHIPPED | OUTPATIENT
Start: 2021-09-28 | End: 2022-04-11 | Stop reason: SDUPTHER

## 2021-09-28 NOTE — PROGRESS NOTES
[facility] 3 Follow Up Visit - Cardiology   Stacey Blow 78 y o  male MRN: [id number]  Unit/Bed#:  Encounter: [id number]    Patient Active Problem List    Diagnosis Date Noted    Hypertension 03/03/2020    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([facility] ) 03/03/2020    H/O unilateral orchiectomy 03/03/2020    Palpitations 03/03/2020    Left ventricular hypertrophy by electrocardiogram 10/02/2018    HOCM (hypertrophic obstructive cardiomyopathy) ([facility] ) 10/02/2018     Plan:  Mr Ashia Nathan had  26 hour of ambulatory heart monitoring done as a part of evaluation for palpitation, which showed average heart rate of 85 bpm, 23 PVCs (0% of total beats ), 681 PACs (0 5% of total beats), patient's symptoms of chest pain coincided with sinus rhythm with only 1 episode of skipped beat sensation coinciding withPAC, 35 seconds of tachycardia which did not perceived symptomatic by patient  He continues having palpitations, mainly at night, around 2-3 a m  Although did not notice association with food intake might have some association with the alcohol intake and his working on dietary modifications  We recommended to try over-the-counter H2-antagonist such as Famotidine as undiagnosed GERD could be possible etiology of his symptoms  Will also discussed dietary changes the could potentially prevent exacerbation of GERD  Mr Ashia Nathan had 24 hour ambulatory blood pressure monitoring (03/31/2021 to 04/01/2021), which showed daytime average blood pressure of 143/ 90 mmHg, nighttime average blood pressure of 126/76 mmHg, with average of 137/86 mmHg  He was started on losartan 50 mg daily which he decreased to 25 mg daily due to episodes of lightheadedness  Mr Ashia Nathan brought today results of the most recent blood work such as CMP, CBC, Urinalysis, lipid panel (LDL 89 mg/dL, total cholesterol 179 mg/dL, triglycerides 57 mg/dL, HDL 79 mg/dL), uric acid with all being within normal limits   The most recent PSA was slighlty elevated at 4 1 ng/mL (previously 2 2 ng/mL) and he will have PSA levels rechecked in few month  At this time he sees oncologist only as needed as the most recent survailance imaging did not show new changes  We will order lactate dehydrogenase, AFP and hCG levels to be checked with the plan to follow up with his oncologist if any abnormalities are noted  Will also check TSH levels given history of palpitations  He will be seen in [facility] clinic in 6 month  Physician Requesting Consult: Primary care  Reason for Consult / Principal Problem: Suspected hypertrophic cardiomyopathy     HPI: Bubba Calle is a 80 year old retired  who presented with complaints of palpitation and chest discomfort  The symptoms began in 2014 and triggered work up that included pharmacologic myocardial perfusion imaging (2014) and echocardiogram (2014) that were reportedly negative and a Holter monitor (2014) that showed premature atrial and ventricular premature beats that did not correlate with the timing of his symptoms (2014)  He continued to be symptomatic and was again evaluated in 2016 and 2018  Most recently, he had a 2-week ambulatory ECG monitor that also reportedly showed no high grade arrhythmias or significant pauses  He is healthy, physically fit with a BMI of 22 5 kg/m2, plays golf for 2 days and works part time for 3 days a week  His past medical history is significant for testicular cancer (seminoma) s/p right orchidectomy and recent diagnosis of prostate enlargement on a CT scan taken in follow up evaluation of his cancer  He does have symptoms of prostatism and in particular wakes up more than once at night to urinate  Mr Awilda Pike is quite concerned about his symptoms of palpitation, chest discomfort and occasional dizziness particularly after the hospitalization of her sister   He completed an echocardiogram on 10- that showed normal LV size and systolic function with an ejection fraction of 60% and no regional wall motion abnormalities; no evidence of LVH, no significant valvular abnormality and borderline increase in the size of the aortic root (3 7 cm)  He also had a treadmill exercise performed that showed excellent exercise tolerance (duration of exercise was 13 min and maximal work rate was 15 3 METs), reached a peak heart rate of 157 bpm (101 % of maximal predicted heart rate)  He was, however, hypertensive at baseline (162/80) with appropriate blood pressure response to stress  There was no chest pain during stress, the stress ECG was normal and no significant arrhythmias were noted (isolated premature ventricular beats)  His blood pressure was high during echocardiography as well  He does not add salt to his diet although he does eat out often, has maintained an ideal body weight and is quite active  He does have strong family history of hypertension  His lipid profile is being checked by his family physician  He was started on bisoprolol to treat high blood pressure and help with the sensation of skipped heart beats that was his main concern at that point       On 1/29/2019: Mr Juwan Carcamo has done extremely well since last office visit  He has brought with him home blood pressure readings that range from 115 to 629 mmHg systolic and 70 to 80 mmHg diastolic  He denies chest pain, shortness of breath and dizziness and feels his palpitation has improved significantly  He exercises regularly and remains active, follows a heart healthy diet low in sodium and is compliant with his medications  I reviewed the medical records of his sister, Kaitlyn Hernandez, who has been evaluated at the [facility] in [place] and appears to me that she has a similar problem likely caused by systemic hypertension  In her evaluation, much emphasis has been given to the reportedly sudden death of their father at age 43 and to Mr Calle's diagnosis of HCM   However, the circumstances of father's death is unclear and the contribution of excess alcohol intake may have been significant  Mr Alva's mild concentric left ventricular hypertrophy is also well explained by his systemic hypertension  Genetic testing at this point does not seem to be helpful considering the otherwise benign family history and mild phenotypic expression  His risk stratification is complete and he would not require further testing at this point      On 5/21/2019: Mr Mendoza Amaya has not tolerated the bisoprolol that was started for both hypertension and palpitation related to premature ventricular contractions  The medication was effective for both but resulted in insomnia, anxiety, chest pain, cold feet and hands, joint pain and most importantly problems getting and maintaining an erection  Even cutting the dose from 5 to 2 5 mg per day did not resolve the side effects and he stopped the medication altogether and noted rebound hypertension (140/90) and increased heart rate (resting heart rate of 90-95 bpm)  He was then started on losartan that has improved blood pressure control but has not done much for the rapid heart rate and palpitation  Today I have switched him from losartan to diltiazem hoping to be able to control both hypertension and palpitation  He will check his blood pressure on a daily basis and send me the results electronically  He has had a CT of chest with contrast on 3- outside the network that has reported mild dilation of the ascending aorta (4 1 cm)  He is scheduled to have a repeat study done next March  I have asked him to also let me know hoe diltiazem is working for his palpitation  If tolerated, it will be switched to once daily formulation of diltiazem      On 11/26/2019: Mr Kaitlyn Wilkinson has not been tolerating the diltiazem and has stopped taking it  He particularly had noticed problems with erectile dysfunction  His home blood pressure readings show values as high as 159/96 mmHg  Today in the clinic his blood pressure is 136/84 mmHg   He denies headaches but has noted that his palpitation has been quite worse after stopping the diltiazem  His cancer is under control and has not shown recurrence after surgery and his prostate problem is stable  He remains active and works part time  I have advised Mr Mendoza Amaya to continue medical therapy for his systemic hypertension and particularly do not stop the medication on his own  He was started on doxazocin today at a very small dose hoping that it would also help with his prostatism symptoms  I have also ordered an extended ambulatory monitor for him to evaluate his frequent and disabling symptom of palpitation  He will be emailing his blood pressure readings and let me know how he feels on the new medication in about a week  He has had blood work done 2 months ago that I do not have access to and he will be sending those to me as well      3-3-2020: Mr Kaitlyn Wilkinson has been managing his hypertension with diet and exercise and has not taken any of his antihypertensive medications  He continues to complain of insomnia and nocturia due to benign prostate enlargement but chooses not to take the prescribed doxazocin  He is scheduled to have chest and abdomen CT to evaluate his ascending aortic aneurysm (41 mm) and the seminoma post surgical right orchidectomy  He will also see his oncologist after imaging studies are done  He will also have blood work including lipid profile  Ambulatory ECG monitor performed in December showed few isolated atrial and ventricular premature beats that did not correlate with the sensation of skipped beats  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  He also plays golf on Mondays and Wednesdays  He is following a heart healthy diet and has kept a normal body mass index  3-: Mr Mendoza Amaya was seen last 3-3-2020   He was then evaluated by his oncologist and had CT scans on 3- and 7- for follow up of his seminoma and right external iliac node  Blood work done on 2020 showed normal normal WBC (5 6k), HGB (15 6), HCT (45 2), PLT (213K), CREATININE (0 94), BUN (18), electrolytes and liver enzymes  The summary of the CT scan performed on 2020: Stable right external iliac lymph node; no evidence of metastatic disease in the chest, abdomen or pelvis; no acute or suspicious process in the chest, abdomen or pelvis  On September 3-2020 he had a transthoracic echocardiogram that showed Normal LV size and systolic function with an ejection fraction of 48%, grade 1 diastolic dysfunction, borderline atrial sizes and no significant valvular abnormality  An ECG done today is normal except for left atrial abnormality  He is complaining of very uncomfortable feelings with skipped beats and palpitation on a daily basis  He has also noted that his blood pressure has been elevated at home (systolic values between 441 and 140 mmHg  He is however no taking the bisoprolol due to side effects  Today, his blood pressure is 140/88 mmHg in the office  I have asked him to do a 48 hour ambulatory ECG monitor and a 24-hour blood pressure monitor to objectively assess his palpitation and hypertension  He has not yet decided to have vaccination for [address]ks  at age 45 suddenly for uncertain reason although he had drinking problem, mother  at age 80 (1) of stroke, she had dementia and bipolar disorder, a 71year old sister has had diabetes and has been diagnosed with "hypertrophic cardiomyopathy" after being taken to a hospital in [place] with chest pain and shortness of breath while running, a brother committed suicide by shooting himself at age 61 ()  He also had problem with excess alcohol intake  Mr Juwan Carcamo has 2 children, a 43 year old son with diabetes (on insulin pump) and a 40 year old daughter without known health problem  He also has four grandchildren, 2 from each of his children   His son has a boy (13) and a girl (5) and his daughter has 2 boys and 1 girl (5 and 3 year old and a )  The grandchildren have had no heart problems and his daughter has gone through pregnancies without any issue  She is now pregnant and expecting to have a girl      Review of Systems: Denies chest pain, shortness of breath, dizziness or syncope  Admits to palpitations on a daily basis  Denies leg edema or ankle swelling but admits to the sensation of cold feet  Historical Information   Past Medical History:   Diagnosis Date    Hypertension     Prostatism     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[facility])      Past Surgical History:   Procedure Laterality Date    COLONOSCOPY      SKIN BIOPSY       Social History     Substance and Sexual Activity   Alcohol Use Yes    Comment: 1-4 weekly     Social History     Substance and Sexual Activity   Drug Use No     Social History     Tobacco Use   Smoking Status Never Smoker   Smokeless Tobacco Never Used     Meds/Allergies   Current Outpatient Medications on File Prior to Visit   Medication Sig Dispense Refill    Ascorbic Acid 500 MG CAPS Take 1,000 mg by mouth 2 (two) times a day        Cholecalciferol 2000 units CAPS Take 1 capsule by mouth 3 (three) times a day        losartan (COZAAR) 25 mg tablet Take 25 mg by mouth daily       multivitamin (THERAGRAN) TABS Take 1 tablet by mouth daily       doxazosin (CARDURA) 1 mg tablet Take 1 tablet (1 mg total) by mouth daily at bedtime (Patient not taking: Reported on 3/3/2020) 30 tablet 0    glucosamine-chondroitin 500-400 MG tablet Take 1 tablet by mouth 3 (three) times a day (Patient not taking: Reported on 2021)      losartan (COZAAR) 50 mg tablet Take 1 tablet (50 mg total) by mouth daily (Patient not taking: Reported on 2021) 90 tablet 3     No current facility-administered medications on file prior to visit       No Known Allergies    Objective   Vitals:   Vitals:    21 1237   BP: 112/60   BP Location: Right arm Patient Position: Sitting   Cuff Size: Standard   Pulse: 83   Weight: 66 6 kg (146 lb 14 4 oz)   Height: 5' 8" (1 727 m)   Body surface area is 1 79 meters squared  Body mass index is 22 34 kg/m²  Invasive Devices     None               Physical Exam:  GEN: Maya Cabrera appears well, alert and oriented x 3, pleasant and cooperative   HEENT: pupils equal, round, and reactive to light; extraocular muscles intact  NECK: supple, no carotid bruits   HEART: regular rhythm, normal S1 and S2, no murmurs, clicks, gallops or rubs   LUNGS: clear to auscultation bilaterally; no wheezes, rales, or rhonchi   ABDOMEN: normal bowel sounds, soft, no tenderness, no distention  EXTREMITIES: peripheral pulses normal; no clubbing, cyanosis, or edema  NEURO: no focal findings   SKIN: normal without suspicious lesions on exposed skin    Lab Results:   No visits with results within 1 Day(s) from this visit  Latest known visit with results is:   Orders Only on 2020   Component Date Value    SARS-CoV-2  2020 Not Detected     Inpatient 2020 Comment      Imaging:  PROCEDURE: Holter monitor - 48 hour     Indication: Palpitations     Duration of monitorin hours     Minimum HR: 54  Average HR: 85  Maximum HR: 130   Ventricular ectopy: 23 (0 % of total beats)  Ventricular ectopy consists of: 23 PVCs  Supraventricular ectopy: 681 (0 5 % of total beats)  Supraventricular ectopy consists of: 681 PACs  Longest RR: 1 1 sec  Arrhythmias: No significant arrhythmia noted  Diary submitted: Yes     Impression  1  Patient's symptoms of chest pain coincided with sinus rhythm  2  Symptoms of skipped beats coincided with a PAC in one instance  3  There was significant artifact on several strips rendering them uninterpretable  4  Symptom diary was returned  5  At 1:59 PM on D1 the patient had 35 seconds of tachycardia that was possibly sinus  The patient diary does not note his activity at this time     6  Supraventricular ectopy consists of: 681 PACs (0 5 % of total beats)  7  Ventricular ectopy consists of: 23 PVCs  8  No significant heart block or pauses were noted     Fellow: Chelsy Philip MD  Attending: Dr Kaylin Barnett / Coordination of Care  Total floor / unit time spent today 40 minutes  Greater than 50% of total time was spent with the patient and / or family counseling and / or coordination of care

## 2021-12-11 LAB
AFP-TM SERPL-MCNC: 2.4 NG/ML (ref 0–8.3)
HCG INTACT+B SERPL-ACNC: <1 MIU/ML (ref 0–3)
LDH SERPL-CCNC: 149 IU/L (ref 121–224)
LDH1 CFR SERPL ELPH: 30 % (ref 17–32)
LDH2 CFR SERPL ELPH: 32 % (ref 25–40)
LDH3 CFR SERPL ELPH: 16 % (ref 17–27)
LDH4 CFR SERPL ELPH: 8 % (ref 5–13)
LDH5 CFR SERPL ELPH: 14 % (ref 4–20)
TSH SERPL DL<=0.005 MIU/L-ACNC: 2.28 UIU/ML (ref 0.45–4.5)

## 2022-04-11 DIAGNOSIS — I10 ESSENTIAL HYPERTENSION: ICD-10-CM

## 2022-04-11 NOTE — TELEPHONE ENCOUNTER
Requested medication(s) are due for refill today: Yes  Patient has already received a courtesy refill: No  Other reason request has been forwarded to provider:    BP completed in the last 6 months    Cr in normal range and within 360 days    K in normal range and within 360 days    Valid encounter within last 6 months

## 2022-04-12 RX ORDER — LOSARTAN POTASSIUM 25 MG/1
25 TABLET ORAL DAILY
Qty: 90 TABLET | Refills: 0 | Status: SHIPPED | OUTPATIENT
Start: 2022-04-12 | End: 2022-05-24 | Stop reason: SDUPTHER

## 2022-05-23 NOTE — PROGRESS NOTES
HCM Clinic Follow Up Visit - Cardiology   Bret Mcgraw 73 y o  male MRN: [id number]  Unit/Bed#:  Encounter: [id number]    Patient Active Problem List    Diagnosis Date Noted    Hypertension 03/03/2020    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 ) 03/03/2020    H/O unilateral orchiectomy 03/03/2020    Palpitations 03/03/2020    Left ventricular hypertrophy by electrocardiogram 10/02/2018    HOCM (hypertrophic obstructive cardiomyopathy) ([facility] ) 10/02/2018     Plan: Mr Priya Phillips was seen and examined with cardiology fellow, Dr Nain Bee  He is asymptomatic, continues being active  He was seen by urologist in 4/2022 at [facility] to follow up on abnormal PSA level of 4 1 ng/ml from 8/2021, which subsequently improved to 2 4 ng/ml in 12/2022  He was recommended to have PSA rechecked in 12 month along with CT scan  Patient declined the latter one as was trying to limit his radiation exposure  He continues taking losartan 25 mg qd with blood pressure being at goal  Home SBP readings are around 110-120 mmHg  We encouraged him to continue staying active  Will check TTE prior to the next visit to follow up on aortic valve insufficiency as well as ascending thoracic aortic dilatation (3 9 x 4 2 cm by CT in 7/2020)  Mr Priya Phillips will be seen in [facility] clinic in 6 month  Physician Requesting Consult: Primary care  Reason for Consult / Principal Problem: Suspected hypertrophic cardiomyopathy     HPI: Bubba Calle is a 79 year old retired  who presented with complaints of palpitation and chest discomfort  The symptoms began in 2014 and triggered work up that included pharmacologic myocardial perfusion imaging (2014) and echocardiogram (2014) that were reportedly negative and a Holter monitor (2014) that showed premature atrial and ventricular premature beats that did not correlate with the timing of his symptoms (2014)  He continued to be symptomatic and was again evaluated in 2016 and 2018   Most recently, he had a 2-week ambulatory ECG monitor that also reportedly showed no high grade arrhythmias or significant pauses  He is healthy, physically fit with a BMI of 22 5 kg/m2, plays golf for 2 days and works part time for 3 days a week  His past medical history is significant for testicular cancer (seminoma) s/p right orchidectomy and recent diagnosis of prostate enlargement on a CT scan taken in follow up evaluation of his cancer  He does have symptoms of prostatism and in particular wakes up more than once at night to urinate  Mr Vamshi Pugh is quite concerned about his symptoms of palpitation, chest discomfort and occasional dizziness particularly after the hospitalization of her sister  He completed an echocardiogram on 10- that showed normal LV size and systolic function with an ejection fraction of 60% and no regional wall motion abnormalities; no evidence of LVH, no significant valvular abnormality and borderline increase in the size of the aortic root (3 7 cm)  He also had a treadmill exercise performed that showed excellent exercise tolerance (duration of exercise was 13 min and maximal work rate was 15 3 METs), reached a peak heart rate of 157 bpm (101 % of maximal predicted heart rate)  He was, however, hypertensive at baseline (162/80) with appropriate blood pressure response to stress  There was no chest pain during stress, the stress ECG was normal and no significant arrhythmias were noted (isolated premature ventricular beats)  His blood pressure was high during echocardiography as well  He does not add salt to his diet although he does eat out often, has maintained an ideal body weight and is quite active  He does have strong family history of hypertension  His lipid profile is being checked by his family physician   He was started on bisoprolol to treat high blood pressure and help with the sensation of skipped heart beats that was his main concern at that point       On 1/29/2019: Mr Vamshi Pugh has done extremely well since last office visit  He has brought with him home blood pressure readings that range from 115 to 366 mmHg systolic and 70 to 80 mmHg diastolic  He denies chest pain, shortness of breath and dizziness and feels his palpitation has improved significantly  He exercises regularly and remains active, follows a heart healthy diet low in sodium and is compliant with his medications  I reviewed the medical records of his sister, Yana Turner, who has been evaluated at the [facility] in [place] and appears to me that she has a similar problem likely caused by systemic hypertension  In her evaluation, much emphasis has been given to the reportedly sudden death of their father at age 43 and to Mr Calle's diagnosis of HCM  However, the circumstances of father's death is unclear and the contribution of excess alcohol intake may have been significant  Mr Calle's mild concentric left ventricular hypertrophy is also well explained by his systemic hypertension  Genetic testing at this point does not seem to be helpful considering the otherwise benign family history and mild phenotypic expression  His risk stratification is complete and he would not require further testing at this point      On 5/21/2019: Mr Ken Alberto has not tolerated the bisoprolol that was started for both hypertension and palpitation related to premature ventricular contractions  The medication was effective for both but resulted in insomnia, anxiety, chest pain, cold feet and hands, joint pain and most importantly problems getting and maintaining an erection  Even cutting the dose from 5 to 2 5 mg per day did not resolve the side effects and he stopped the medication altogether and noted rebound hypertension (140/90) and increased heart rate (resting heart rate of 90-95 bpm)  He was then started on losartan that has improved blood pressure control but has not done much for the rapid heart rate and palpitation   Today I have switched him from losartan to diltiazem hoping to be able to control both hypertension and palpitation  He will check his blood pressure on a daily basis and send me the results electronically  He has had a CT of chest with contrast on 3- outside the network that has reported mild dilation of the ascending aorta (4 1 cm)  He is scheduled to have a repeat study done next March  I have asked him to also let me know hoe diltiazem is working for his palpitation  If tolerated, it will be switched to once daily formulation of diltiazem      On 11/26/2019: Mr Madelin Topete has not been tolerating the diltiazem and has stopped taking it  He particularly had noticed problems with erectile dysfunction  His home blood pressure readings show values as high as 159/96 mmHg  Today in the clinic his blood pressure is 136/84 mmHg  He denies headaches but has noted that his palpitation has been quite worse after stopping the diltiazem  His cancer is under control and has not shown recurrence after surgery and his prostate problem is stable  He remains active and works part time  I have advised Mr Kushal Childress to continue medical therapy for his systemic hypertension and particularly do not stop the medication on his own  He was started on doxazocin today at a very small dose hoping that it would also help with his prostatism symptoms  I have also ordered an extended ambulatory monitor for him to evaluate his frequent and disabling symptom of palpitation  He will be emailing his blood pressure readings and let me know how he feels on the new medication in about a week  He has had blood work done 2 months ago that I do not have access to and he will be sending those to me as well      3-3-2020: Mr Madelin Topete has been managing his hypertension with diet and exercise and has not taken any of his antihypertensive medications   He continues to complain of insomnia and nocturia due to benign prostate enlargement but chooses not to take the prescribed doxazocin  He is scheduled to have chest and abdomen CT to evaluate his ascending aortic aneurysm (41 mm) and the seminoma post surgical right orchidectomy  He will also see his oncologist after imaging studies are done  He will also have blood work including lipid profile  Ambulatory ECG monitor performed in December showed few isolated atrial and ventricular premature beats that did not correlate with the sensation of skipped beats  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  He also plays golf on Mondays and Wednesdays  He is following a heart healthy diet and has kept a normal body mass index      3-: Mr Rosmery Bazan was seen last 3-3-2020  He was then evaluated by his oncologist and had CT scans on 3- and 7- for follow up of his seminoma and right external iliac node  Blood work done on 7- showed normal normal WBC (5 6k), HGB (15 6), HCT (45 2), PLT (213K), CREATININE (0 94), BUN (18), electrolytes and liver enzymes  The summary of the CT scan performed on 7-: Stable right external iliac lymph node; no evidence of metastatic disease in the chest, abdomen or pelvis; no acute or suspicious process in the chest, abdomen or pelvis  On September 3-2020 he had a transthoracic echocardiogram that showed Normal LV size and systolic function with an ejection fraction of 36%, grade 1 diastolic dysfunction, borderline atrial sizes and no significant valvular abnormality  An ECG done today is normal except for left atrial abnormality  He is complaining of very uncomfortable feelings with skipped beats and palpitation on a daily basis  He has also noted that his blood pressure has been elevated at home (systolic values between 836 and 140 mmHg  He is however no taking the bisoprolol due to side effects  Today, his blood pressure is 140/88 mmHg in the office   I have asked him to do a 48 hour ambulatory ECG monitor and a 24-hour blood pressure monitor to objectively assess his palpitation and hypertension  He has not yet decided to have vaccination for COVID     9-: Mr Will Rudolph had  26 hour of ambulatory heart monitoring done as a part of evaluation for palpitation, which showed average heart rate of 85 bpm, 23 PVCs (0% of total beats ), 681 PACs (0 5% of total beats), patient's symptoms of chest pain coincided with sinus rhythm with only 1 episode of skipped beat sensation coinciding withPAC, 35 seconds of tachycardia which did not perceived symptomatic by patient  He continues having palpitations, mainly at night, around 2-3 a m  Although did not notice association with food intake might have some association with the alcohol intake and his working on dietary modifications  We recommended to try over-the-counter H2-antagonist such as Famotidine as undiagnosed GERD could be possible etiology of his symptoms  Will also discussed dietary changes the could potentially prevent exacerbation of GERD  Mr Will Rudolph had 24 hour ambulatory blood pressure monitoring (03/31/2021 to 04/01/2021), which showed daytime average blood pressure of 143/ 90 mmHg, nighttime average blood pressure of 126/76 mmHg, with average of 137/86 mmHg  He was started on losartan 50 mg daily which he decreased to 25 mg daily due to episodes of lightheadedness  Mr Will Rudolph brought today results of the most recent blood work such as CMP, CBC, Urinalysis, lipid panel (LDL 89 mg/dL, total cholesterol 179 mg/dL, triglycerides 57 mg/dL, HDL 79 mg/dL), uric acid with all being within normal limits  The most recent PSA was slighlty elevated at 4 1 ng/mL (previously 2 2 ng/mL) and he will have PSA levels rechecked in few month  At this time he sees oncologist only as needed as the most recent survailance imaging did not show new changes   We will order lactate dehydrogenase, AFP and hCG levels to be checked with the plan to follow up with his oncologist if any abnormalities are noted  Will also check TSH levels given history of palpitations  He will be seen in [facility] clinic in 11 month        Family history  Father  at age 43 suddenly for uncertain reason although he had drinking problem, mother  at age 80 (1) of stroke, she had dementia and bipolar disorder, a 77 year old sister has had diabetes and has been diagnosed with "hypertrophic cardiomyopathy" after being taken to a hospital in [place] with chest pain and shortness of breath while running, a brother committed suicide by shooting himself at age 61 (26)  He also had problem with excess alcohol intake  Mr Ollie Giron has 2 children, a 43 year old son with diabetes (on insulin pump) and a 40 year old daughter without known health problem  He also has four grandchildren, 2 from each of his children  His son has a boy (13) and a girl (5) and his daughter has 2 boys and 1 girl (5 and 3 year old and a )  The grandchildren have had no heart problems and his daughter has gone through pregnancies without any issue  She is now pregnant and expecting to have a girl      Review of Systems: Denies chest pain, shortness of breath, dizziness, palpitations, lower extremity edema or syncope       Historical Information   Past Medical History:   Diagnosis Date    Hypertension     Prostatism     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[facility])      Past Surgical History:   Procedure Laterality Date    COLONOSCOPY      SKIN BIOPSY       Social History     Substance and Sexual Activity   Alcohol Use Yes    Comment: 1-4 weekly     Social History     Substance and Sexual Activity   Drug Use No     Social History     Tobacco Use   Smoking Status Never Smoker   Smokeless Tobacco Never Used     Meds/Allergies   all current active meds have been reviewed  No Known Allergies    Current Outpatient Medications on File Prior to Visit   Medication Sig Dispense Refill    Ascorbic Acid 500 MG CAPS Take 1,000 mg by mouth 2 (two) times a day        losartan (COZAAR) 25 mg tablet Take 1 tablet (25 mg total) by mouth daily 90 tablet 0    multivitamin (THERAGRAN) TABS Take 1 tablet by mouth daily        No current facility-administered medications on file prior to visit  Objective   Vitals:    22 1121   BP: 123/75   BP Location: Left arm   Patient Position: Sitting   Cuff Size: Standard   Pulse: 88   Resp: 18   SpO2: 100%   Weight: 66 7 kg (147 lb)   Height: 5' 8" (1 727 m)   Body surface area is 1 79 meters squared  Body mass index is 22 35 kg/m²  Invasive Devices  Report    None               Physical Exam:  GEN: Roxanne Bass appears well, alert and oriented x 3, pleasant and cooperative   HEENT: pupils equal, round, and reactive to light; extraocular muscles intact  NECK: supple, no carotid bruits   HEART: regular rhythm, normal S1 and S2, no murmurs, clicks, gallops or rubs   LUNGS: clear to auscultation bilaterally; no wheezes, rales, or rhonchi   ABDOMEN: normal bowel sounds, soft, no tenderness, no distention  EXTREMITIES: peripheral pulses normal; no clubbing, cyanosis, or edema  NEURO: no focal findings   SKIN: normal without suspicious lesions on exposed skin    Lab Results:   No visits with results within 1 Day(s) from this visit  Latest known visit with results is:   Orders Only on 2021   Component Date Value    LDH 2021 149     LD-1 2021 30     LD-2 2021 32     LD-3 2021 16 (A)    LD-4 2021 8     LD-5 2021 14     AFP-Tumor Marker 2021 2 4     Hcg Beta Subunit Quant, * 2021 <1     TSH 2021 2 280        Imaging: I have personally reviewed pertinent reports  [id number] normal sinus rhythm with rate of 90 BPM, right atrial enlargement, right axis deviation    Counseling / Coordination of Care  Total floor / unit time spent today 40 minutes  Greater than 50% of total time was spent with the patient and / or family counseling and / or coordination of care

## 2022-05-24 ENCOUNTER — OFFICE VISIT (OUTPATIENT)
Dept: CARDIAC SURGERY | Facility: CLINIC | Age: 72
End: 2022-05-24
Payer: COMMERCIAL

## 2022-05-24 VITALS
DIASTOLIC BLOOD PRESSURE: 75 MMHG | RESPIRATION RATE: 18 BRPM | WEIGHT: 147 LBS | BODY MASS INDEX: 22.28 KG/M2 | HEIGHT: 68 IN | SYSTOLIC BLOOD PRESSURE: 123 MMHG | OXYGEN SATURATION: 100 % | HEART RATE: 88 BPM

## 2022-05-24 DIAGNOSIS — I42.1 HOCM (HYPERTROPHIC OBSTRUCTIVE CARDIOMYOPATHY) (HCC): Primary | ICD-10-CM

## 2022-05-24 DIAGNOSIS — I10 PRIMARY HYPERTENSION: ICD-10-CM

## 2022-05-24 DIAGNOSIS — I10 ESSENTIAL HYPERTENSION: ICD-10-CM

## 2022-05-24 PROCEDURE — 99214 OFFICE O/P EST MOD 30 MIN: CPT | Performed by: INTERNAL MEDICINE

## 2022-05-24 RX ORDER — LOSARTAN POTASSIUM 25 MG/1
25 TABLET ORAL DAILY
Qty: 90 TABLET | Refills: 3 | Status: SHIPPED | OUTPATIENT
Start: 2022-05-24

## 2022-06-29 ENCOUNTER — OFFICE VISIT (OUTPATIENT)
Dept: DERMATOLOGY | Facility: CLINIC | Age: 72
End: 2022-06-29
Payer: COMMERCIAL

## 2022-06-29 VITALS — WEIGHT: 145 LBS | TEMPERATURE: 98.8 F | BODY MASS INDEX: 22.05 KG/M2

## 2022-06-29 DIAGNOSIS — Z12.83 SKIN EXAM, SCREENING FOR CANCER: Primary | ICD-10-CM

## 2022-06-29 DIAGNOSIS — L82.1 SEBORRHEIC KERATOSIS: ICD-10-CM

## 2022-06-29 PROCEDURE — 99213 OFFICE O/P EST LOW 20 MIN: CPT | Performed by: DERMATOLOGY

## 2022-06-29 NOTE — PROGRESS NOTES
[facility] Follow Up Note    Patient Name: Sirisha Sexton  Encounter Date: 6/29/2022    Today's Chief Concerns:  Sandra Drop Concern #1:  Full body skin check    Concern #2:  Neck area       Current Medications:    Current Outpatient Medications:     Ascorbic Acid 500 MG CAPS, Take 1,000 mg by mouth 2 (two) times a day  , Disp: , Rfl:     losartan (COZAAR) 25 mg tablet, Take 1 tablet (25 mg total) by mouth in the morning , Disp: 90 tablet, Rfl: 3    multivitamin (THERAGRAN) TABS, Take 1 tablet by mouth daily , Disp: , Rfl:     CONSTITUTIONAL:   Vitals:    06/29/22 1434   Temp: 98 8 °F (37 1 °C)   TempSrc: Temporal   Weight: 65 8 kg (145 lb)         Specific Alerts:    Have you been seen by a [facility]atologist in the last 3 years? YES    Are you pregnant or planning to become pregnant? No    Are you currently or planning to be nursing or breast feeding? No    No Known Allergies    May we call your Preferred Phone number to discuss your specific medical information? YES    May we leave a detailed message that includes your specific medical information? YES    Have you traveled outside of the [facility] in the past 3 months? No    Do you currently have a pacemaker or defibrillator? No    Do you have any artificial heart valves, joints, plates, screws, rods, stents, pins, etc? No   - If Yes, were any placed within the last 2 years? Do you require any medications prior to a surgical procedure? No   - If Yes, for which procedure? - If Yes, what medications to you require? Are you taking any medications that cause you to bleed more easily ("blood thinners") No    Have you ever experienced a rapid heartbeat with epinephrine? No    Have you ever been treated with "gold" (gold sodium thiomalate) therapy? No    [facility] can help with wrinkles, "laugh lines," facial volume loss, "double chin," "love handles," age spots, and more   Are you interested in learning today about some of the skin enhancement procedures that we offer? (If Yes, please provide more detail) No    Review of Systems:  Have you recently had or currently have any of the following? · Fever or chills: No  · Night Sweats: No  · Headaches: No  · Weight Gain: No  · Weight Loss: No  · Blurry Vision: No  · Nausea: No  · Vomiting: No  · Diarrhea: No  · Blood in Stool: No  · Abdominal Pain: No  · Itchy Skin: No  · Painful Joints: No  · Swollen Joints: No  · Muscle Pain: No  · Irregular Mole: No  · Sun Burn: No  · Dry Skin: No  · Skin Color Changes: No  · Scar or Keloid: No  · Cold Sores/Fever Blisters: No  · Bacterial Infections/MRSA: No  · Anxiety: No  · Depression: No  · Suicidal or Homicidal Thoughts: No      PSYCH: Normal mood and affect  EYES: Normal conjunctiva  ENT: Normal lips and oral mucosa  CARDIOVASCULAR: No edema  RESPIRATORY: Normal respirations  HEME/LYMPH/IMMUNO:  No regional lymphadenopathy except as noted below in ASSESSMENT AND PLAN BY DIAGNOSIS    FULL ORGAN SYSTEM SKIN EXAM (SKIN)   Hair, Scalp, Ears, Face Normal except as noted below in Assessment   Neck, Cervical Chain Nodes Normal except as noted below in Assessment   Right Arm/Hand/Fingers Normal except as noted below in Assessment   Left Arm/Hand/Fingers Normal except as noted below in Assessment   Chest/Breasts/Axillae Viewed areas Normal except as noted below in Assessment   Abdomen, Umbilicus Normal except as noted below in Assessment   Back/Spine Normal except as noted below in Assessment   Right Leg, Foot, Toes Normal except as noted below in Assessment   Left Leg, Foot, Toes Normal except as noted below in Assessment         1  SKIN EXAM; SCREENING FOR CANCER   Physical Exam:   Anatomic Location Affected: Full body skin check    Morphological Description:  Normal appearing skin upon examination    Pertinent Positives:   Pertinent Negatives:     Additional History of Present Condition:  Patient is here today for a routine skin check  Asymptomatic  No new or changing skin lesions  No history of skin cancer  Assessment and Plan:  Based on a thorough discussion of this condition and the management approach to it (including a comprehensive discussion of the known risks, side effects and potential benefits of treatment), the patient (family) agrees to implement the following specific plan:   Start using sun protective clothing and an SPF 50+ daily when outside in the sun   Continue to monitor skin for any abnormal changes and growths of lesions   Recommend patient to follow up yearly or as needed for a skin check   Reassure benign   No treatment required unless bothersome on a cosmetic aspect  Out of pocket cost for removal is $250 00 per lesion   Continue to monitor for any changes  Seborrheic Keratosis    Scattered monomorphous waxy grayish-tan epidermal flat papules, trunk and neck, extremities  No atypical skin lesions  A seborrheic keratosis is a harmless warty spot that appears during adult life as a common sign of skin aging  Seborrheic keratoses can arise on any area of skin, covered or uncovered, with the usual exception of the palms and soles  They do not arise from mucous membranes  Seborrheic keratoses can have highly variable appearance  Seborrheic keratoses are extremely common  It has been estimated that over 90% of adults over the age of 61 years have one or more of them  They occur in males and females of all races, typically beginning to erupt in the 35s or 45s  They are uncommon under the age of 21 years  The precise cause of seborrhoeic keratoses is not known  Seborrhoeic keratoses are considered degenerative in nature  As time goes by, seborrheic keratoses tend to become more numerous  Some people inherit a tendency to develop a very large number of them; some people may have hundreds of them      The name "seborrheic keratosis" is misleading, because these lesions are not limited to a seborrhoeic distribution (scalp, mid-face, chest, upper back), nor are they formed from sebaceous glands, nor are they associated with sebum -- which is greasy  Seborrheic keratosis may also be called "SK," "Seb K," "basal cell papilloma," "senile wart," or "barnacle "      Researchers have noted:   Eruptive seborrhoeic keratoses can follow sunburn or dermatitis   Skin friction may be the reason they appear in body folds   Viral cause (e g , human papillomavirus) seems unlikely   Stable and clonal mutations or activation of FRFR3, PIK3CA, ALBERT, AKT1 and EGFR genes are found in seborrhoeic keratoses   Seborrhoeic keratosis can arise from solar lentigo   FRFR3 mutations also arise in solar lentigines  These mutations are associated with increased age and location on the head and neck, suggesting a role of ultraviolet radiation in these lesions   Seborrheic keratoses do not harbour tumour suppressor gene mutations   Epidermal growth factor receptor inhibitors, which are used to treat some cancers, often result in an increase in verrucal (warty) keratoses  There is no easy way to remove multiple lesions on a single occasion  Unless a specific lesion is "inflamed" and is causing pain or stinging/burning or is bleeding, most insurance companies do not authorize treatment        Scribe Attestation    I,:  Manuelito Aquino MA am acting as a scribe while in the presence of the attending physician :       I,:  Aryan Nix MD personally performed the services described in this documentation    as scribed in my presence :

## 2022-06-29 NOTE — PATIENT INSTRUCTIONS
1  SKIN EXAM; SCREENING FOR CANCER       Assessment and Plan:  Based on a thorough discussion of this condition and the management approach to it (including a comprehensive discussion of the known risks, side effects and potential benefits of treatment), the patient (family) agrees to implement the following specific plan:  Start using sun protective clothing and an SPF 50+ daily when outside in the sun  Continue to monitor skin for any abnormal changes and growths of lesions  Recommend patient to follow up yearly or as needed for a skin check  2  SEBORRHEIC KERATOSIS; NON-INFLAMED      Assessment and Plan:  Based on a thorough discussion of this condition and the management approach to it (including a comprehensive discussion of the known risks, side effects and potential benefits of treatment), the patient (family) agrees to implement the following specific plan:  Reassure benign  No treatment required unless bothersome on a cosmetic aspect  Out of pocket cost for removal is $250 00 per lesion  Continue to monitor for any changes  Seborrheic Keratosis  A seborrheic keratosis is a harmless warty spot that appears during adult life as a common sign of skin aging  Seborrheic keratoses can arise on any area of skin, covered or uncovered, with the usual exception of the palms and soles  They do not arise from mucous membranes  Seborrheic keratoses can have highly variable appearance  Seborrheic keratoses are extremely common  It has been estimated that over 90% of adults over the age of 61 years have one or more of them  They occur in males and females of all races, typically beginning to erupt in the 35s or 45s  They are uncommon under the age of 21 years  The precise cause of seborrhoeic keratoses is not known  Seborrhoeic keratoses are considered degenerative in nature  As time goes by, seborrheic keratoses tend to become more numerous   Some people inherit a tendency to develop a very large number of them; some people may have hundreds of them  The name "seborrheic keratosis" is misleading, because these lesions are not limited to a seborrhoeic distribution (scalp, mid-face, chest, upper back), nor are they formed from sebaceous glands, nor are they associated with sebum -- which is greasy  Seborrheic keratosis may also be called "SK," "Seb K," "basal cell papilloma," "senile wart," or "barnacle "      Researchers have noted:  Eruptive seborrhoeic keratoses can follow sunburn or dermatitis  Skin friction may be the reason they appear in body folds  Viral cause (e g , human papillomavirus) seems unlikely  Stable and clonal mutations or activation of FRFR3, PIK3CA, ALBERT, AKT1 and EGFR genes are found in seborrhoeic keratoses  Seborrhoeic keratosis can arise from solar lentigo  FRFR3 mutations also arise in solar lentigines  These mutations are associated with increased age and location on the head and neck, suggesting a role of ultraviolet radiation in these lesions  Seborrheic keratoses do not harbour tumour suppressor gene mutations  Epidermal growth factor receptor inhibitors, which are used to treat some cancers, often result in an increase in verrucal (warty) keratoses  There is no easy way to remove multiple lesions on a single occasion  Unless a specific lesion is "inflamed" and is causing pain or stinging/burning or is bleeding, most insurance companies do not authorize treatment

## 2022-10-17 ENCOUNTER — HOSPITAL ENCOUNTER (OUTPATIENT)
Dept: NON INVASIVE DIAGNOSTICS | Facility: HOSPITAL | Age: 72
Discharge: HOME | End: 2022-10-17
Payer: COMMERCIAL

## 2022-10-17 VITALS
HEIGHT: 68 IN | WEIGHT: 145 LBS | DIASTOLIC BLOOD PRESSURE: 75 MMHG | HEART RATE: 88 BPM | SYSTOLIC BLOOD PRESSURE: 123 MMHG | BODY MASS INDEX: 21.98 KG/M2

## 2022-10-17 DIAGNOSIS — I42.1 HOCM (HYPERTROPHIC OBSTRUCTIVE CARDIOMYOPATHY) (HCC): ICD-10-CM

## 2022-10-17 PROCEDURE — 93356 MYOCRD STRAIN IMG SPCKL TRCK: CPT

## 2022-10-17 PROCEDURE — 93306 TTE W/DOPPLER COMPLETE: CPT

## 2022-10-18 LAB
AORTIC ROOT: 3.4 CM
APICAL FOUR CHAMBER EJECTION FRACTION: 62 %
AV LVOT MEAN GRADIENT: 1 MMHG
AV LVOT PEAK GRADIENT: 2 MMHG
AV REGURGITATION PRESSURE HALF TIME: 424 MS
DOP CALC LVOT PEAK VEL VTI: 12.32 CM
DOP CALC LVOT PEAK VEL: 0.73 M/S
E WAVE DECELERATION TIME: 185 MS
FRACTIONAL SHORTENING: 24 % (ref 28–44)
GLOBAL LONGITUIDAL STRAIN: -15 %
INTERVENTRICULAR SEPTUM IN DIASTOLE (PARASTERNAL SHORT AXIS VIEW): 1 CM
INTERVENTRICULAR SEPTUM: 1 CM (ref 0.6–1.1)
LAAS-AP2: 12.7 CM2
LAAS-AP4: 10.9 CM2
LEFT ATRIUM SIZE: 2 CM
LEFT INTERNAL DIMENSION IN SYSTOLE: 2.8 CM (ref 2.1–4)
LEFT VENTRICULAR INTERNAL DIMENSION IN DIASTOLE: 3.7 CM (ref 3.5–6)
LEFT VENTRICULAR POSTERIOR WALL IN END DIASTOLE: 0.9 CM
LEFT VENTRICULAR STROKE VOLUME: 29 ML
LVSV (TEICH): 29 ML
MV E'TISSUE VEL-LAT: 12 CM/S
MV E'TISSUE VEL-SEP: 9 CM/S
MV PEAK A VEL: 0.69 M/S
MV PEAK E VEL: 37 CM/S
MV STENOSIS PRESSURE HALF TIME: 54 MS
MV VALVE AREA P 1/2 METHOD: 4.07 CM2
RIGHT ATRIUM AREA SYSTOLE A4C: 17.7 CM2
RIGHT VENTRICLE ID DIMENSION: 4.3 CM
SL CV AV DECELERATION TIME RETROGRADE: 1463 MS
SL CV AV PEAK GRADIENT RETROGRADE: 86 MMHG
SL CV LEFT ATRIUM LENGTH A2C: 3.8 CM
SL CV LV EF: 62
SL CV PED ECHO LEFT VENTRICLE DIASTOLIC VOLUME (MOD BIPLANE) 2D: 57 ML
SL CV PED ECHO LEFT VENTRICLE SYSTOLIC VOLUME (MOD BIPLANE) 2D: 29 ML
TR MAX PG: 20 MMHG
TR PEAK VELOCITY: 2.2 M/S
TRICUSPID VALVE PEAK REGURGITATION VELOCITY: 2.22 M/S

## 2022-10-18 PROCEDURE — 93356 MYOCRD STRAIN IMG SPCKL TRCK: CPT | Performed by: INTERNAL MEDICINE

## 2022-10-18 PROCEDURE — 93306 TTE W/DOPPLER COMPLETE: CPT | Performed by: INTERNAL MEDICINE

## 2023-02-06 NOTE — PROGRESS NOTES
Nima Mckeon 3 Follow-up Visit- Cardiology   Aria Jarrell 81 y o  male MRN: [id number]  Unit/Bed#:  Encounter: [id number]    Patient Active Problem List    Diagnosis Date Noted   • 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([facility] ) 03/03/2020   • H/O unilateral orchiectomy 03/03/2020   • Hypertension 03/03/2020   • Palpitations 03/03/2020   • Left ventricular hypertrophy by electrocardiogram 10/02/2018   • HOCM (hypertrophic obstructive cardiomyopathy) ([facility] ) 10/02/2018     Plan:  Since his last office visit in May 2022 patient tells me that overall he feels well from a cardiac standpoint  He does continue to note palpitations as he did during the past several office visits  He states that these palpitations occur most frequently in the early morning hours between 3 and 5 AM   He states he is unsure if these palpitations wake him up or if he just happens to be awake at that time and he is noticing them  He will occasionally notice some as well if he gets up to urinate in the middle of the night  Overall, the patient states he does not notice many palpitations during the day  He denies any associated chest discomfort, shortness of breath or lightheadedness when these palpitations occur  During our last office visit had been discussed that his increased palpitations in the early morning may be related to acid reflux/GERD and it was recommended that he trial over-the-counter H2 antagonist such as famotidine but the patient states he has not trialed this medication as he prefers to not take medicines  He does state that he agrees undiagnosed acid reflux/GERD may be underlying etiology to these palpitations as he notes that if he eats more offensive foods he will have increased palpitations the next day  He also states he has trialed Tums before bed on days that he knows he has eaten more offensive foods and he often notes the palpitations are lessened    I did again review the pharmacodynamics of H2 blockers and how this may help him but he again declines to take this on a daily basis  The patient does state that he has now completely eliminated all caffeine and alcohol his diet over the past several weeks and he notes this has helped to lessen his palpitations as well  With regards to exercise the patient does resistance training with resistance bands roughly 30 minutes a day almost every day of the week with no complaints of chest discomfort or shortness of breath and this training does not precipitate his palpitations  His most recent 48-hour Holter monitor from March 2021 revealed PACs and PVCs but always correlating with his symptoms  I did review with the patient that if these symptoms become more prominent we can retrial beta-blockers  He denies any syncope or syncope also denies any lower extremity edema or orthopnea  Occasionally the patient will note some lightheadedness when he is exercising but he states that he has tracked this back to days where he was not as hydrated as he should be  He notes this lightheadedness does not occur every time he exercises and he typically does not feel lightheaded at any other time during the day  We did review that his blood pressure overall is controlled  Upon initial check on arrival to the office visit his blood pressure was 136/87 on my recheck at the end of office visit was 122/78  The patient states that he does check his blood pressure at home frequently and his systolic blood pressures typically in the mid 110's  He follows a low salt diet  Patient attempts to follow a low-fat/low-cholesterol diet as well  He states that he did have labs mid 2022 for his PCP and we will contact his PCPs office for these results  The patient states that he no longer following with urology telling me that his PCP is capable of following his PSA and he did not want any further radiologic screening    We did review his prior diagnosis of ascending aortic aneurysm last checked in July 2020 at [facility] CT of chest/abdomen/pelvis: is mild fusiform dilatation of the ascending thoracic aorta measuring 3 9 x 4 2 cm previously measuring 4 0 cm in maximal dimension  Did offer repeat CT scan as it has been several years since his last imaging and he declines updated imaging at this time  Since his last office visit on 5- the patient had updated echocardiogram performed with the following results: Left Ventricle: Left ventricular cavity size is normal  Wall thickness is mildly increased  There is mild asymmetric hypertrophy of the basal septal wall  The left ventricular ejection fraction is 62%  Systolic function is normal  Global longitudinal strain is reduced at -15%  Wall motion is normal  Diastolic function is mildly abnormal, consistent with grade I (abnormal) relaxation  There is no LV dynamic obstruction  No major valvular abnormalities noted  Aortic root normal in size  Physician Requesting Consult: Primary Care  Reason for Consult / Principal Problem: Suspected hypertrophic cardiomyopathy    History of Present Illness     HPI: Brii Moy is a 81y o  year old retired  who presented with complaints of palpitation and chest discomfort  The symptoms began in 2014 and triggered work up that included pharmacologic myocardial perfusion imaging (2014) and echocardiogram (2014) that were reportedly negative and a Holter monitor (2014) that showed premature atrial and ventricular premature beats that did not correlate with the timing of his symptoms (2014)  He continued to be symptomatic and was again evaluated in 2016 and 2018  Most recently, he had a 2-week ambulatory ECG monitor that also reportedly showed no high grade arrhythmias or significant pauses  He is healthy, physically fit with a BMI of 22 5 kg/m2, plays golf for 2 days and works part time for 3 days a week   His past medical history is significant for testicular cancer (seminoma) s/p right orchidectomy and recent diagnosis of prostate enlargement on a CT scan taken in follow up evaluation of his cancer  He does have symptoms of prostatism and in particular wakes up more than once at night to urinate  Mr Sanna Vides is quite concerned about his symptoms of palpitation, chest discomfort and occasional dizziness particularly after the hospitalization of her sister  He completed an echocardiogram on 10- that showed normal LV size and systolic function with an ejection fraction of 60% and no regional wall motion abnormalities; no evidence of LVH, no significant valvular abnormality and borderline increase in the size of the aortic root (3 7 cm)  He also had a treadmill exercise performed that showed excellent exercise tolerance (duration of exercise was 13 min and maximal work rate was 15 3 METs), reached a peak heart rate of 157 bpm (101 % of maximal predicted heart rate)  He was, however, hypertensive at baseline (162/80) with appropriate blood pressure response to stress  There was no chest pain during stress, the stress ECG was normal and no significant arrhythmias were noted (isolated premature ventricular beats)  His blood pressure was high during echocardiography as well  He does not add salt to his diet although he does eat out often, has maintained an ideal body weight and is quite active  He does have strong family history of hypertension  His lipid profile is being checked by his family physician  He was started on bisoprolol to treat high blood pressure and help with the sensation of skipped heart beats that was his main concern at that point       On 1/29/2019: Mr Sanna Vides has done extremely well since last office visit  He has brought with him home blood pressure readings that range from 115 to 526 mmHg systolic and 70 to 80 mmHg diastolic  He denies chest pain, shortness of breath and dizziness and feels his palpitation has improved significantly   He exercises regularly and remains active, follows a heart healthy diet low in sodium and is compliant with his medications  I reviewed the medical records of his sister, Shelby Doherty, who has been evaluated at the [facility] in [place] and appears to me that she has a similar problem likely caused by systemic hypertension  In her evaluation, much emphasis has been given to the reportedly sudden death of their father at age 43 and to Mr Alva's diagnosis of HCM  However, the circumstances of father's death is unclear and the contribution of excess alcohol intake may have been significant  Mr Alva's mild concentric left ventricular hypertrophy is also well explained by his systemic hypertension  Genetic testing at this point does not seem to be helpful considering the otherwise benign family history and mild phenotypic expression  His risk stratification is complete and he would not require further testing at this point      On 5/21/2019: Mr Luiz Welch has not tolerated the bisoprolol that was started for both hypertension and palpitation related to premature ventricular contractions  The medication was effective for both but resulted in insomnia, anxiety, chest pain, cold feet and hands, joint pain and most importantly problems getting and maintaining an erection  Even cutting the dose from 5 to 2 5 mg per day did not resolve the side effects and he stopped the medication altogether and noted rebound hypertension (140/90) and increased heart rate (resting heart rate of 90-95 bpm)  He was then started on losartan that has improved blood pressure control but has not done much for the rapid heart rate and palpitation  Today I have switched him from losartan to diltiazem hoping to be able to control both hypertension and palpitation  He will check his blood pressure on a daily basis and send me the results electronically  He has had a CT of chest with contrast on 3- outside the network that has reported mild dilation of the ascending aorta (4 1 cm)   He is scheduled to have a repeat study done next March  I have asked him to also let me know hoe diltiazem is working for his palpitation  If tolerated, it will be switched to once daily formulation of diltiazem      On 11/26/2019: Mr Katherine Silvestre has not been tolerating the diltiazem and has stopped taking it  He particularly had noticed problems with erectile dysfunction  His home blood pressure readings show values as high as 159/96 mmHg  Today in the clinic his blood pressure is 136/84 mmHg  He denies headaches but has noted that his palpitation has been quite worse after stopping the diltiazem  His cancer is under control and has not shown recurrence after surgery and his prostate problem is stable  He remains active and works part time  I have advised Mr Yuni Aguillon to continue medical therapy for his systemic hypertension and particularly do not stop the medication on his own  He was started on doxazocin today at a very small dose hoping that it would also help with his prostatism symptoms  I have also ordered an extended ambulatory monitor for him to evaluate his frequent and disabling symptom of palpitation  He will be emailing his blood pressure readings and let me know how he feels on the new medication in about a week  He has had blood work done 2 months ago that I do not have access to and he will be sending those to me as well      3-3-2020: Mr Katherine Silvestre has been managing his hypertension with diet and exercise and has not taken any of his antihypertensive medications  He continues to complain of insomnia and nocturia due to benign prostate enlargement but chooses not to take the prescribed doxazocin   He is scheduled to have chest and abdomen CT to evaluate his ascending aortic aneurysm (41 mm) and the seminoma post surgical right orchidectomy  He will also see his oncologist after imaging studies are done  He will also have blood work including lipid profile  Ambulatory ECG monitor performed in December showed few isolated atrial and ventricular premature beats that did not correlate with the sensation of skipped beats  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  He also plays golf on Mondays and Wednesdays  He is following a heart healthy diet and has kept a normal body mass index      3-: Mr Calle was seen last 3-3-2020  He was then evaluated by his oncologist and had CT scans on 3- and 7- for follow up of his seminoma and right external iliac node  Blood work done on 7- showed normal normal WBC (5 6k), HGB (15 6), HCT (45 2), PLT (213K), CREATININE (0 94), BUN (18), electrolytes and liver enzymes  The summary of the CT scan performed on 7-: Stable right external iliac lymph node; no evidence of metastatic disease in the chest, abdomen or pelvis; no acute or suspicious process in the chest, abdomen or pelvis  On September 3-2020 he had a transthoracic echocardiogram that showed Normal LV size and systolic function with an ejection fraction of 08%, grade 1 diastolic dysfunction, borderline atrial sizes and no significant valvular abnormality  An ECG done today is normal except for left atrial abnormality  He is complaining of very uncomfortable feelings with skipped beats and palpitation on a daily basis  He has also noted that his blood pressure has been elevated at home (systolic values between 334 and 140 mmHg  He is however no taking the bisoprolol due to side effects  Today, his blood pressure is 140/88 mmHg in the office   I have asked him to do a 48 hour ambulatory ECG monitor and a 24-hour blood pressure monitor to objectively assess his palpitation and hypertension  He has not yet decided to have vaccination for COVID      9-: Mr [facility] had  26 hour of ambulatory heart monitoring done as a part of evaluation for palpitation, which showed average heart rate of 85 bpm, 23 PVCs (0% of total beats ), 681 PACs (0 5% of total beats), patient's symptoms of chest pain coincided with sinus rhythm with only 1 episode of skipped beat sensation coinciding withPAC, 35 seconds of tachycardia which did not perceived symptomatic by patient  He continues having palpitations, mainly at night, around 2-3 a m  Although did not notice association with food intake might have some association with the alcohol intake and his working on dietary modifications   We recommended to try over-the-counter H2-antagonist such as Famotidine as undiagnosed GERD could be possible etiology of his symptoms   Will also discussed dietary changes the could potentially prevent exacerbation of GERD   Mr Calle had 24 hour ambulatory blood pressure monitoring (03/31/2021 to 04/01/2021), which showed daytime average blood pressure of 143/ 90 mmHg, nighttime average blood pressure of 126/76 mmHg, with average of 137/86 mmHg  He was started on losartan 50 mg daily which he decreased to 25 mg daily due to episodes of lightheadedness  Mr Joanna Browning brought today results of the most recent blood work such as CMP, CBC, Urinalysis, lipid panel (LDL 89 mg/dL, total cholesterol 179 mg/dL, triglycerides 57 mg/dL, HDL 79 mg/dL), uric acid with all being within normal limits  The most recent PSA was slighlty elevated at 4 1 ng/mL (previously 2 2 ng/mL) and he will have PSA levels rechecked in few month  At this time he sees oncologist only as needed as the most recent survailance imaging did not show new changes  We will order lactate dehydrogenase, AFP and hCG levels to be checked with the plan to follow up with his oncologist if any abnormalities are noted  Will also check TSH levels given history of palpitations  He will be seen in [facility] clinic in 6 month      5-: Mr Joanna Browning was seen and examined with cardiology fellow, Dr Alana Hdz  He is asymptomatic, continues being active   He was seen by urologist in 4/2022 at [facility] to follow up on abnormal PSA level of 4 1 ng/ml from 2021, which subsequently improved to 2 4 ng/ml in 2022  He was recommended to have PSA rechecked in 12 month along with CT scan  Patient declined the latter one as was trying to limit his radiation exposure  He continues taking losartan 25 mg qd with blood pressure being at goal  Home SBP readings are around 110-120 mmHg  We encouraged him to continue staying active  Will check TTE prior to the next visit to follow up on aortic valve insufficiency as well as ascending thoracic aortic dilatation (3 9 x 4 2 cm by CT in 2020)  Mr Geetha Larson will be seen in [facility] clinic in 6 month         Risk stratification:  Nonsustained ventricular tachycardia-no   Severe left ventricular hypertrophy-no  Family history of sudden death-no  Unexplained syncope-no  LVOT obstruction-no  Atrial fibrillation and left atrial dilation-no  Age-81years old  NYHA-class I  Myocardial fibrosis-no cardiac MRI completed  LV systolic dysfunction-no  Apical aneurysm-no    Review of systems:  Denies chest pain, shortness of breath, dizziness, lower extremity edema or syncope    Notes continued palpitations    Family history:  Father  at age 43 suddenly for uncertain reason although he had drinking problem, mother  at age 80 (1) of stroke, she had dementia and bipolar disorder, a 77 year old sister has had diabetes and has been diagnosed with "hypertrophic cardiomyopathy" after being taken to a hospital in [place] with chest pain and shortness of breath while running, a brother committed suicide by shooting himself at age 61 (26)  He also had problem with excess alcohol intake  Mr Geetha Larson has 2 children, a 36 year old son with diabetes (on insulin pump) has never been screened to the patient's knowledege and a 37 year old daughter without known health problem also has not been screened  He also has four grandchildren, 2 from each of his children   His son has a boy (25) and a girl (16) and his daughter has 4 children (ages 8, 5 and 2 and 1/2)  The grandchildren have had no heart problems and his daughter has gone through pregnancies without any issue  Genetic testing:  Deferred given benign family history    Devices: none      Historical Information   Past Medical History:   Diagnosis Date   • Hypertension    • Prostatism    • 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[facility])      Past Surgical History:   Procedure Laterality Date   • COLONOSCOPY     • SKIN BIOPSY       History reviewed  No pertinent family history  Current Outpatient Medications on File Prior to Visit   Medication Sig Dispense Refill   • Ascorbic Acid 500 MG CAPS Take 1,000 mg by mouth 2 (two) times a day       • losartan (COZAAR) 25 mg tablet Take 1 tablet (25 mg total) by mouth in the morning  90 tablet 3   • multivitamin (THERAGRAN) TABS Take 1 tablet by mouth daily        No current facility-administered medications on file prior to visit  No Known Allergies  Social History     Substance and Sexual Activity   Alcohol Use Not Currently    Comment: 1-4 weekly     Social History     Substance and Sexual Activity   Drug Use No     Social History     Tobacco Use   Smoking Status Never   Smokeless Tobacco Never         Objective   Vitals: Blood pressure 136/87, pulse 74, height 5' 8" (1 727 m), weight 66 kg (145 lb 8 oz), SpO2 98 %  , Body mass index is 22 12 kg/m²  ,   Vitals:    02/13/23 0849   BP: 136/87   BP Location: Left arm   Patient Position: Sitting   Cuff Size: Standard   Pulse: 74   SpO2: 98%   Weight: 66 kg (145 lb 8 oz)   Height: 5' 8" (1 727 m)      Body mass index is 22 12 kg/m²  Body surface area is 1 79 meters squared        Invasive Devices     None                 Physical Exam:  GEN: Scharlene Blind appears well, alert and oriented x 3, pleasant and cooperative   HEENT: pupils equal, round, and reactive to light; extraocular muscles intact  NECK: supple, no carotid bruits   HEART: regular rhythm, normal S1 and S2, no murmurs, clicks, gallops or rubs   LUNGS: clear to auscultation bilaterally; no wheezes, rales, or rhonchi   ABDOMEN: normal bowel sounds, soft, no tenderness, no distention  EXTREMITIES: peripheral pulses normal; no clubbing, cyanosis, or edema  NEURO: no focal findings   SKIN: normal without suspicious lesions on exposed skin    Lab Results:   No results found for: WBC, RBC, HGB, HCT, MCV, PLT, RDW  No results found for: NA, K, CL, CO2, ANIONGAP, BUN, CREATININE, EGFR, GLUCOSE, CALCIUM, AST, ALT, ALKPHOS, PROT, BILITOT  No results found for: MG  No results found for: CHOL, HDL, TRIG, LDLCALC  No results found for: IGH2TWNCSFAS, T3FREE, FREET4, K9XBUYQ, Q3TNSAH    Imaging:   I have personally reviewed pertinent films in PACS  No results found  EKG: sinus rhythm with rate of 78     Media Information  Document Information    Clinical Image - Mobile Device   EKG 2-13-23 02/13/2023 9:54 AM   Attached To: Office Visit on 2/13/23 with Jad Hernandez, [facility], [address]       Echo from 10/17/2022    Findings    Left Ventricle Left ventricular cavity size is normal  Wall thickness is mildly increased  There is mild asymmetric hypertrophy of the basal septal wall  The left ventricular ejection fraction is 62%  Systolic function is normal  Global longitudinal strain is reduced at -15%  Wall motion is normal  Diastolic function is mildly abnormal, consistent with grade I (abnormal) relaxation  There is no LV dynamic obstruction  Right Ventricle Right ventricular cavity size is normal  Systolic function is normal  Wall thickness is normal    Left Atrium The atrium is normal in size  Right Atrium The atrium is normal in size  Aortic Valve The aortic valve is trileaflet  The leaflets are not thickened  The leaflets are not calcified  The leaflets exhibit normal mobility  There is trace regurgitation  The aortic valve has no significant stenosis     Mitral Valve Mitral valve structure is normal  There is trace regurgitation  There is no evidence of stenosis  Tricuspid Valve Tricuspid valve structure is normal  There is trace regurgitation  There is no evidence of stenosis  The right ventricular systolic pressure is normal    Pulmonic Valve Pulmonic valve structure is normal  There is no evidence of regurgitation  There is no evidence of stenosis  Ascending Aorta The aortic root is normal in size  IVC/SVC The inferior vena cava is normal in size  Pericardium There is no pericardial effusion  The pericardium is normal in appearance       Left Ventricle Measurements    Function/Volumes   A4C EF 62 %         Dimensions   LVIDd 3 7 cm         LVIDS 2 8 cm         IVSd 1 cm         LVPWd 0 9 cm         FS 24 %         Diastolic Filling   MV E' Tissue Velocity Septal 9 cm/s         MV E' Tissue Velocity Lateral 12 cm/s         E wave deceleration time 185 ms         MV Peak E Clarke 37 cm/s         MV Peak A Clarke 0 69 m/s         Strain   GLS -15 %          Report Measurements   AV LVOT peak gradient 2 mmHg              Interventricular Septum Measurements    Shunt Ratio   LVOT peak VTI 12 32 cm         LVOT peak clarke 0 73 m/s              Right Ventricle Measurements    Dimensions   RVID d 4 3 cm               Left Atrium Measurements    Dimensions   LA size 2 cm         LA length (A2C) 3 8 cm               Right Atrium Measurements    Dimensions   RAA A4C 17 7 cm2               Atrial Septum Measurements    Shunt Ratio   LVOT peak VTI 12 32 cm         LVOT peak clarke 0 73 m/s               Aortic Valve Measurements    Stenosis   LVOT peak clarke 0 73 m/s         LVOT peak VTI 12 32 cm         LVOT mn grad 1 mmHg         AV LVOT peak gradient 2 mmHg         Regurgitation   AV peak gradient 86 mmHg         AV Deceleration Time 1,463 ms         AV regurgitation pressure 1/2 time 424 ms               Mitral Valve Measurements    Stenosis   MV stenosis pressure 1/2 time 54 ms         MV valve area p 1/2 method 4 07 cm2               Tricuspid Valve Measurements    RVSP Parameters   TR Peak Clarke 2 2 m/s         Triscuspid Valve Regurgitation Peak Gradient 20 mmHg               Aorta Measurements    Aortic Dimensions   Ao root 3 4 cm               Exam Details    Performed Procedure Technologist Supporting Staff Performing Physician   Echo complete w/ strain Marylene Oxford, RS           Appointment Date/Status Modality Department    10/17/2022     Completed BE ECHO RM 1 BE CAR NON INV           Begin Exam End Exam  End Exam Questionnaires   10/17/2022  9:00 AM 10/17/2022 10:00 AM  PATIENT EDUCATION            All Reviewers List    Yulia Cedeño DO on 11/1/2022  9:05 AM   Rola Holguin MD on 10/18/2022  8:20 AM             24 hour Holter monitor from 3/24/2021    Impression     1  Patient's symptoms of chest pain coincided with sinus rhythm  2  Symptoms of skipped beats coincided with a PAC in one instance  3  There was significant artifact on several strips rendering them uninterpretable  4  Symptom diary was returned  5  At 1:59 PM on D1 the patient had 35 seconds of tachycardia that was possibly sinus  The patient diary does not note his activity at this time  6  Supraventricular ectopy consists of: 681 PACs (0 5 % of total beats)  7  Ventricular ectopy consists of: 23 PVCs  8  No significant heart block or pauses were noted         Counseling / Coordination of Care  Total floor / unit time spent today 45 minutes  Greater than 50% of total time was spent with the patient and / or family counseling and / or coordination of care  A description of the counseling / coordination of care: Lina Suarez

## 2023-02-13 ENCOUNTER — OFFICE VISIT (OUTPATIENT)
Dept: CARDIAC SURGERY | Facility: CLINIC | Age: 73
End: 2023-02-13

## 2023-02-13 VITALS
HEART RATE: 74 BPM | HEIGHT: 68 IN | OXYGEN SATURATION: 98 % | WEIGHT: 145.5 LBS | DIASTOLIC BLOOD PRESSURE: 87 MMHG | SYSTOLIC BLOOD PRESSURE: 136 MMHG | BODY MASS INDEX: 22.05 KG/M2

## 2023-02-13 DIAGNOSIS — R00.2 PALPITATIONS: Primary | ICD-10-CM

## 2023-03-28 ENCOUNTER — OFFICE VISIT (OUTPATIENT)
Dept: DERMATOLOGY | Facility: CLINIC | Age: 73
End: 2023-03-28

## 2023-03-28 VITALS — HEIGHT: 68 IN | BODY MASS INDEX: 21.98 KG/M2 | WEIGHT: 145 LBS

## 2023-03-28 DIAGNOSIS — L81.4 LENTIGO: ICD-10-CM

## 2023-03-28 DIAGNOSIS — D22.9 MULTIPLE BENIGN MELANOCYTIC NEVI: Primary | ICD-10-CM

## 2023-03-28 DIAGNOSIS — D48.5 NEOPLASM OF UNCERTAIN BEHAVIOR OF SKIN: ICD-10-CM

## 2023-03-28 DIAGNOSIS — M67.449 DIGITAL MUCOUS CYST: ICD-10-CM

## 2023-03-28 NOTE — PROGRESS NOTES
"[facility] Note     Patient Name: Shanice Bowden  Encounter Date: 3/28/23     Have you [facility] [address] [facility] in the last 3 years and, if so, which description applies to you? Yes  I have been here within the last 3 years, and my medical history has NOT changed since that time  I am FEMALE/of child-bearing potential     REVIEW OF SYSTEMS:  Have you recently had or currently have any of the following? · No changes in my recent health  PAST MEDICAL HISTORY:  Have you personally ever had or currently have any of the following? If \"YES,\" then please provide more detail  · No changes in my medical history  FAMILY HISTORY:  Any \"first degree relatives\" (parent, brother, sister, or child) with the following? • No changes in my family's known health  PATIENT EXPERIENCE:    • Do you want the Dermatologist to perform a COMPLETE skin exam today including a clinical examination under the \"bra and underwear\" areas? NO  • If necessary, do we have your permission to call and leave a detailed message on your Preferred Phone number that includes your specific medical information?   Yes      No Known Allergies   Current Outpatient Medications:   •  Ascorbic Acid 500 MG CAPS, Take 1,000 mg by mouth 2 (two) times a day  , Disp: , Rfl:   •  losartan (COZAAR) 25 mg tablet, Take 1 tablet (25 mg total) by mouth in the morning , Disp: 90 tablet, Rfl: 3  •  multivitamin (THERAGRAN) TABS, Take 1 tablet by mouth daily , Disp: , Rfl:           • Whom besides the patient is providing clinical information about today's encounter?   o NO ADDITIONAL HISTORIAN (patient alone provided history)    Physical Exam and Assessment/Plan by Diagnosis:    MELANOCYTIC NEVI (\"Moles\")    Physical Exam:  • Anatomic Location Affected:   Mostly on sun-exposed areas of the trunk and extremities  • Morphological Description:  Scattered, 1-4mm round to ovoid, symmetric and evenly bordered, regularly pigmented " "macules/papules without outliers other than if noted elsewhere in today's note  • Pertinent Positives:  • Pertinent Negatives: Additional History of Present Condition:      Assessment and Plan:  Based on a thorough discussion of this condition and the management approach to it (including a comprehensive discussion of the known risks, side effects and potential benefits of treatment), the patient (family) agrees to implement the following specific plan:    • The patient was encouraged to use an SPF30+ broad spectrum sunscreen daily and re-apply every 2-3 outdoors while outside  The importance of sun protection, self-skin exams, and sun avoidance was emphasized  An annual full body skin exam is recommended, and the patient was encouraged to return to the office sooner for any new or changing lesions of concerns  • Benign, reassured  • Annual skin check     Melanocytic Nevi  Melanocytic nevi (\"moles\") are tan or brown, raised or flat areas of the skin which have an increased number of melanocytes  Melanocytes are the cells in our body which make pigment and account for skin color  Some moles are present at birth (I e , \"congenital nevi\"), while others come up later in life (i e , \"acquired nevi\")  The sun can stimulate the body to make more moles  Sunburns are not the only thing that triggers more moles  Chronic sun exposure can do it too  Clinically distinguishing a healthy mole from melanoma may be difficult, even for experienced dermatologists  The \"ABCDE's\" of moles have been suggested as a means of helping to alert a person to a suspicious mole and the possible increased risk of melanoma  The suggestions for raising alert are as follows:    Asymmetry: Healthy moles tend to be symmetric, while melanomas are often asymmetric  Asymmetry means if you draw a line through the mole, the two halves do not match in color, size, shape, or surface texture   Asymmetry can be a result of rapid enlargement of a " "mole, the development of a raised area on a previously flat lesion, scaling, ulceration, bleeding or scabbing within the mole  Any mole that starts to demonstrate \"asymmetry\" should be examined promptly by a board certified dermatologist      Border: Healthy moles tend to have discrete, even borders  The border of a melanoma often blends into the normal skin and does not sharply delineate the mole from normal skin  Any mole that starts to demonstrate \"uneven borders\" should be examined promptly by a board certified dermatologist      Color: Healthy moles tend to be one color throughout  Melanomas tend to be made up of different colors ranging from dark black, blue, white, or red  Any mole that demonstrates a color change should be examined promptly by a board certified dermatologist      Diameter: Healthy moles tend to be smaller than 0 6 cm in size; an exception are \"congenital nevi\" that can be larger  Melanomas tend to grow and can often be greater than 0 6 cm (1/4 of an inch, or the size of a pencil eraser)  This is only a guideline, and many normal moles may be larger than 0 6 cm without being unhealthy  Any mole that starts to change in size (small to bigger or bigger to smaller) should be examined promptly by a board certified dermatologist      Evolving: Healthy moles tend to \"stay the same  \"  Melanomas may often show signs of change or evolution such as a change in size, shape, color, or elevation  Any mole that starts to itch, bleed, crust, burn, hurt, or ulcerate or demonstrate a change or evolution should be examined promptly by a board certified dermatologist         LENTIGO    Physical Exam:  • Anatomic Location Affected:  Face, trunk and upper extremities  • Morphological Description:  Light brown well demarcated macules on sun exposed skin with reassuring dermoscopy  • Pertinent Positives:  • Pertinent Negatives:     Additional History of Present Condition:      Assessment and Plan:  Based on " a thorough discussion of this condition and the management approach to it (including a comprehensive discussion of the known risks, side effects and potential benefits of treatment), the patient (family) agrees to implement the following specific plan:    • When outside we recommend using a wide brim hat, sunglasses, long sleeve and pants, sunscreen with SPF 57+ with reapplication every 2 hours, or SPF specific clothing       What is a lentigo? A lentigo is a pigmented flat or slightly raised lesion with a clearly defined edge  Unlike an ephelis (freckle), it does not fade in the winter months  There are several kinds of lentigo  The name lentigo originally referred to its appearance resembling a small lentil  The plural of lentigo is lentigines, although “lentigos” is also in common use  Who gets lentigines? Lentigines can affect males and females of all ages and races  Solar lentigines are especially prevalent in fair skinned adults  Lentigines associated with syndromes are present at birth or arise during childhood  What causes lentigines? Common forms of lentigo are due to exposure to ultraviolet radiation:  • Sun damage including sunburn   • Indoor tanning   • Phototherapy, especially photochemotherapy (PUVA)    Ionizing radiation, eg radiation therapy, can also cause lentigines  Several familial syndromes associated with widespread lentigines originate from mutations in Viriglio-MAP kinase, mTOR signaling and PTEN pathways  What is the treatment for lentigines? Most lentigines are left alone  Attempts to lighten them may not be successful  The following approaches are used:  • SPF 50+ broad-spectrum sunscreen   • Hydroquinone bleaching cream   • Alpha hydroxy acids   • Vitamin C   • Retinoids   • Azelaic acid   • Chemical peels  Individual lesions can be permanently removed using:  • Cryotherapy   • Intense pulsed light   • Pigment lasers    How can lentigines be prevented?   Lentigines associated "with exposure ultraviolet radiation can be prevented by very careful sun protection  Clothing is more successful at preventing new lentigines than are sunscreens  What is the outlook for lentigines? Lentigines usually persist  They may increase in number with age and sun exposure  Some in sun-protected sites may fade and disappear  BERNAL ANGIOMAS    Physical Exam:  • Anatomic Location Affected:  trunk  • Morphological Description:  Scattered cherry red, variably sized papules  • Pertinent Positives:  • Pertinent Negatives: Additional History of Present Condition:      Assessment and Plan:  Based on a thorough discussion of this condition and the management approach to it (including a comprehensive discussion of the known risks, side effects and potential benefits of treatment), the patient (family) agrees to implement the following specific plan:    • Monitor for changes  • Benign, reassured      Assessment and Plan:    Cherry angioma, also known as Cathy Brooke spots, are benign vascular skin lesions  A \"cherry angioma\" is a firm red, blue or purple papule, 0 1-1 cm in diameter  When thrombosed, they can appear black in colour until evaluated with a dermatoscope when the red or purple colour is more easily seen  Cherry angioma may develop on any part of the body but most often appear on the scalp, face, lips and trunk  An angioma is due to proliferating endothelial cells; these are the cells that line the inside of a blood vessel  Angiomas can arise in early life or later in life; the most common type of angioma is a cherry angioma  Cherry angiomas are very common in males and females of any age or race  They are more noticeable in white skin than in skin of colour  They markedly increase in number from about the age of 36  There may be a family history of similar lesions  Eruptive cherry angiomas have been rarely reported to be associated with internal malignancy   The cause of angiomas is " "unknown  Genetic analysis of cherry angiomas has shown that they frequently carry specific somatic missense mutations in the GNAQ and GNA11 (Q209H) genes, which are involved in other vascular and melanocytic proliferations  SEBORRHEIC KERATOSIS; NON-INFLAMED    Physical Exam:  • Anatomic Location Affected:  Trunk, left neck at patient site of concern  • Morphological Description:  Brown waxy variably sized \"stuck-on\" appearing papules with reassuring dermoscopy  • Pertinent Positives:  • Pertinent Negatives: Additional History of Present Condition:      Assessment and Plan:  Based on a thorough discussion of this condition and the management approach to it (including a comprehensive discussion of the known risks, side effects and potential benefits of treatment), the patient (family) agrees to implement the following specific plan:    • Monitor for changes  • Benign, reassured      Seborrheic Keratosis  A seborrheic keratosis is a harmless warty spot that appears during adult life as a common sign of skin aging  Seborrheic keratoses can arise on any area of skin, covered or uncovered, with the usual exception of the palms and soles  They do not arise from mucous membranes  Seborrheic keratoses can have highly variable appearance  Seborrheic keratoses are extremely common  It has been estimated that over 90% of adults over the age of 61 years have one or more of them  They occur in males and females of all races, typically beginning to erupt in the 35s or 45s  They are uncommon under the age of 21 years  The precise cause of seborrhoeic keratoses is not known  Seborrhoeic keratoses are considered degenerative in nature  As time goes by, seborrheic keratoses tend to become more numerous  Some people inherit a tendency to develop a very large number of them; some people may have hundreds of them  There is no easy way to remove multiple lesions on a single occasion    Unless a specific lesion is " "\"inflamed\" and is causing pain or stinging/burning or is bleeding, most insurance companies do not authorize treatment  NEOPLASM OF UNCERTAIN BEHAVIOR OF SKIN    Physical Exam:  • (Anatomic Location); (Size and Morphological Description); (Differential Diagnosis):  o Specimen A Central frontal scalp; skin; shave biopsy; 0 4 cm crusted red papule; ddx;  AK versus squamous cell carcinoma  • Pertinent Positives:  • Pertinent Negatives:      Assessment and Plan:  • I have discussed with the patient that a sample of skin via a \"skin biopsy” would be potentially helpful to further make a specific diagnosis under the microscope  • Based on a thorough discussion of this condition and the management approach to it (including a comprehensive discussion of the known risks, side effects and potential benefits of treatment), the patient (family) agrees to implement the following specific plan:    o Procedure:  Skin Biopsy  After a thorough discussion of treatment options and risk/benefits/alternatives (including but not limited to local pain, scarring, dyspigmentation, blistering, possible superinfection, and inability to confirm a diagnosis via histopathology), verbal and written consent were obtained and portion of the rash was biopsied for tissue sample  See below for consent that was obtained from patient and subsequent Procedure Note  PROCEDURE TANGENTIAL (SHAVE) BIOPSY NOTE:    • Performing Physician: Kalie Pavon  • Anatomic Location; Clinical Description with size (cm); Pre-Op Diagnosis:   o Specimen A Central frontal scalp; skin; shave biopsy; 0 4 cm crusted red papule; ddx;  AK versus squamous cell carcinoma  • Post-op diagnosis: Same     • Local anesthesia: 1% Lidocaine HCL     • Topical anesthesia: None    • Hemostasis: Aluminum chloride       After obtaining informed consent  at which time there was a discussion about the purpose of biopsy  and low risks of infection and bleeding    The area was prepped and " "draped in the usual fashion  Anesthesia was obtained with 1% lidocaine with epinephrine  A shave biopsy to an appropriate sampling depth was obtained by Shave (Dermablade or 15 blade) The resulting wound was covered with surgical ointment and bandaged appropriately  The patient tolerated the procedure well without complications and was without signs of functional compromise  Specimen has been sent for review by Dermatopathology  Standard post-procedure care has been explained and has been included in written form within the patient's copy of Informed Consent  INFORMED CONSENT DISCUSSION AND POST-OPERATIVE INSTRUCTIONS FOR PATIENT    I   RATIONALE FOR PROCEDURE  I understand that a skin biopsy allows the Dermatologist to test a lesion or rash under the microscope to obtain a diagnosis  It usually involves numbing the area with numbing medication and removing a small piece of skin; sometimes the area will be closed with sutures  In this specific procedure, sutures are not usually needed  If any sutures are placed, then they are usually need to be removed in 2 weeks or less  I understand that my Dermatologist recommends that a skin \"shave\" biopsy be performed today  A local anesthetic, similar to the kind that a dentist uses when filling a cavity, will be injected with a very small needle into the skin area to be sampled  The injected skin and tissue underneath \"will go to sleep” and become numb so no pain should be felt afterwards  An instrument shaped like a tiny \"razor blade\" (shave biopsy instrument) will be used to cut a small piece of tissue and skin from the area so that a sample of tissue can be taken and examined more closely under the microscope  A slight amount of bleeding will occur, but it will be stopped with direct pressure and a pressure bandage and any other appropriate methods  I understands that a scar will form where the wound was created    Surgical ointment will be applied " "to help protect the wound  Sutures are not usually needed  II   RISKS AND POTENTIAL COMPLICATIONS   I understand the risks and potential complications of a skin biopsy include but are not limited to the following:  • Bleeding  • Infection  • Pain  • Scar/keloid  • Skin discoloration  • Incomplete Removal  • Recurrence  • Nerve Damage/Numbness/Loss of Function  • Allergic Reaction to Anesthesia  • Biopsies are diagnostic procedures and based on findings additional treatment or evaluation may be required  • Loss or destruction of specimen resulting in no additional findings    My Dermatologist has explained to me the nature of the condition, the nature of the procedure, and the benefits to be reasonably expected compared with alternative approaches  My Dermatologist has discussed the likelihood of major risks or complications of this procedure including the specific risks listed above, such as bleeding, infection, and scarring/keloid  I understand that a scar is expected after this procedure  I understand that my physician cannot predict if the scar will form a \"keloid,\" which extends beyond the borders of the wound that is created  A keloid is a thick, painful, and bumpy scar  A keloid can be difficult to treat, as it does not always respond well to therapy, which includes injecting cortisone directly into the keloid every few weeks  While this usually reduces the pain and size of the scar, it does not eliminate it  I understand that photographs may be taken before and after the procedure  These will be maintained as part of the medical providers confidential records and may not be made available to me  I further authorize the medical provider to use the photographs for teaching purposes or to illustrate scientific papers, books, or lectures if in his/her judgment, medical research, education, or science may benefit from its use      I have had an opportunity to fully inquire about the risks and " "benefits of this procedure and its alternatives  I have been given ample time and opportunity to ask questions and to seek a second opinion if I wished to do so  I acknowledge that there have specifically been no guarantees as to the cosmetic results from the procedure  I am aware that with any procedure there is always the possibility of an unexpected complication  III  POST-PROCEDURAL CARE (WHAT YOU WILL NEED TO DO \"AFTER THE BIOPSY\" TO OPTIMIZE HEALING)    • Keep the area clean and dry  Try NOT to remove the bandage or get it wet for the first 24 hours  • Gently clean the area and apply surgical ointment (such as Vaseline petrolatum ointment, which is available \"over the counter\" and not a prescription) to the biopsy site for up to 2 weeks straight  This acts to protect the wound from the outside world  • Sutures are not usually placed in this procedure  If any sutures were placed, return for suture removal as instructed (generally 1 week for the face, 2 weeks for the body)  • Take Acetaminophen (Tylenol) for discomfort, if no contraindications  Ibuprofen or aspirin could make bleeding worse  • Call our office immediately for signs of infection: fever, chills, increased redness, warmth, tenderness, discomfort/pain, or pus or foul smell coming from the wound  WHAT TO DO IF THERE IS ANY BLEEDING? If a small amount of bleeding is noticed, place a clean cloth over the area and apply firm pressure for ten minutes  Check the wound after 10 minutes of direct pressure  If bleeding persists, try one more time for an additional 10 minutes of direct pressure on the area  If the bleeding becomes heavier or does not stop after the second attempt, or if you have any other questions about this procedure, then please call your [facility] Dermatologist by calling [phone] (SKIN)       I hereby acknowledge that I have reviewed and verified the site with my Dermatologist and have requested and " authorized my Dermatologist to proceed with the procedure  DIGITAL MUCOUS CYST    Physical Exam:  • Anatomic Location Affected:  Right 2nd toe  • Morphological Description:  Fluid filled papule with overlying scale  • Pertinent Positives:  • Pertinent Negatives: Additional History of Present Condition:  Present for years  Assessment and Plan:   Based on a thorough discussion of this condition and the management approach to it (including a comprehensive discussion of the known risks, side effects and potential benefits of treatment), the patient (family) agrees to implement the following specific plan:     - Counseled patient and diagnosis and potential connection to the joint   space  As such, I refer all of these lesions to orthopedic surgery  Given the   scaling associated with it however, recommend removal with tissue    confirmation of benign etiology  - Referral placed for orthopedic surgeon - patient will call and schedule   appointment with his current orthopedic doctor if he treats these  What is a digital mucous cyst?  Digital mucous cysts, also known as digital myxoid pseudocysts, are shiny papules found at the end of a finger or toe, close to the nail  It is called a pseudocyst because it is not surrounded by a capsule, unlike a true cyst  It is also called a myxoid cyst, a mucous cyst, a digital ganglion cyst, and a digital synovial cyst  They usually occur on the hands, although they have also been noted on the toes  Often, these cysts are asymptomatic and do not require treatment  What causes digital mucous cysts? The cyst arises from degeneration in the connective tissue on the top of the last segment of the finger  There appear to be two variations  The first is a form of focal mucinosis, a condition characterized by abnormal deposits of mucopolysaccharides (mucins) in the skin   The other variation arises from extension of the lining of the finger joint and is due to osteoarthritis - a type of ganglion  What are the symptoms of a digital mucous cyst?     The digital pseudocyst is semi-translucent, with a smooth shiny surface  It is most often located within a centimeter of the base of the nail  It often causes a groove in the nail  This may be up to several millimeter across and extends the length of the nail  Jelly-like sticky fluid may be expressed from the pseudocyst (sometimes tinged with blood)  What is the treatment for digital mucous cysts? Treatments which may be successful for digital myxoid pseudocyst include:  • Repeatedly pressing firmly on the cyst  • Squeezing out its contents (make a hole with a sterile needle)  • Cryotherapy (freezing)  • Steroid injection  • Sclerosant injection  • Surgical removal  Unfortunately, digital myxoid pseudocysts often recur despite treatment

## 2023-03-28 NOTE — PATIENT INSTRUCTIONS
"MELANOCYTIC NEVI (\"Moles\")      Assessment and Plan:  Based on a thorough discussion of this condition and the management approach to it (including a comprehensive discussion of the known risks, side effects and potential benefits of treatment), the patient (family) agrees to implement the following specific plan:    The patient was encouraged to use an SPF30+ broad spectrum sunscreen daily and re-apply every 2-3 outdoors while outside  The importance of sun protection, self-skin exams, and sun avoidance was emphasized  An annual full body skin exam is recommended, and the patient was encouraged to return to the office sooner for any new or changing lesions of concerns  Benign, reassured  Annual skin check     Melanocytic Nevi  Melanocytic nevi (\"moles\") are tan or brown, raised or flat areas of the skin which have an increased number of melanocytes  Melanocytes are the cells in our body which make pigment and account for skin color  Some moles are present at birth (I e , \"congenital nevi\"), while others come up later in life (i e , \"acquired nevi\")  The sun can stimulate the body to make more moles  Sunburns are not the only thing that triggers more moles  Chronic sun exposure can do it too  Clinically distinguishing a healthy mole from melanoma may be difficult, even for experienced dermatologists  The \"ABCDE's\" of moles have been suggested as a means of helping to alert a person to a suspicious mole and the possible increased risk of melanoma  The suggestions for raising alert are as follows:    Asymmetry: Healthy moles tend to be symmetric, while melanomas are often asymmetric  Asymmetry means if you draw a line through the mole, the two halves do not match in color, size, shape, or surface texture  Asymmetry can be a result of rapid enlargement of a mole, the development of a raised area on a previously flat lesion, scaling, ulceration, bleeding or scabbing within the mole    Any mole that starts to " "demonstrate \"asymmetry\" should be examined promptly by a board certified dermatologist      Border: Healthy moles tend to have discrete, even borders  The border of a melanoma often blends into the normal skin and does not sharply delineate the mole from normal skin  Any mole that starts to demonstrate \"uneven borders\" should be examined promptly by a board certified dermatologist      Color: Healthy moles tend to be one color throughout  Melanomas tend to be made up of different colors ranging from dark black, blue, white, or red  Any mole that demonstrates a color change should be examined promptly by a board certified dermatologist      Diameter: Healthy moles tend to be smaller than 0 6 cm in size; an exception are \"congenital nevi\" that can be larger  Melanomas tend to grow and can often be greater than 0 6 cm (1/4 of an inch, or the size of a pencil eraser)  This is only a guideline, and many normal moles may be larger than 0 6 cm without being unhealthy  Any mole that starts to change in size (small to bigger or bigger to smaller) should be examined promptly by a board certified dermatologist      Evolving: Healthy moles tend to \"stay the same  \"  Melanomas may often show signs of change or evolution such as a change in size, shape, color, or elevation    Any mole that starts to itch, bleed, crust, burn, hurt, or ulcerate or demonstrate a change or evolution should be examined promptly by a board certified dermatologist         Karley Joshi and Plan:  Based on a thorough discussion of this condition and the management approach to it (including a comprehensive discussion of the known risks, side effects and potential benefits of treatment), the patient (family) agrees to implement the following specific plan:    When outside we recommend using a wide brim hat, sunglasses, long sleeve and pants, sunscreen with SPF 68+ with reapplication every 2 hours, or SPF specific clothing       What is a " lentigo? A lentigo is a pigmented flat or slightly raised lesion with a clearly defined edge  Unlike an ephelis (freckle), it does not fade in the winter months  There are several kinds of lentigo  The name lentigo originally referred to its appearance resembling a small lentil  The plural of lentigo is lentigines, although “lentigos” is also in common use  Who gets lentigines? Lentigines can affect males and females of all ages and races  Solar lentigines are especially prevalent in fair skinned adults  Lentigines associated with syndromes are present at birth or arise during childhood  What causes lentigines? Common forms of lentigo are due to exposure to ultraviolet radiation:  Sun damage including sunburn   Indoor tanning   Phototherapy, especially photochemotherapy (PUVA)    Ionizing radiation, eg radiation therapy, can also cause lentigines  Several familial syndromes associated with widespread lentigines originate from mutations in Virgilio-MAP kinase, mTOR signaling and PTEN pathways  What is the treatment for lentigines? Most lentigines are left alone  Attempts to lighten them may not be successful  The following approaches are used:  SPF 50+ broad-spectrum sunscreen   Hydroquinone bleaching cream   Alpha hydroxy acids   Vitamin C   Retinoids   Azelaic acid   Chemical peels  Individual lesions can be permanently removed using:  Cryotherapy   Intense pulsed light   Pigment lasers    How can lentigines be prevented? Lentigines associated with exposure ultraviolet radiation can be prevented by very careful sun protection  Clothing is more successful at preventing new lentigines than are sunscreens  What is the outlook for lentigines? Lentigines usually persist  They may increase in number with age and sun exposure  Some in sun-protected sites may fade and disappear      BERNAL ANGIOMAS      Assessment and Plan:  Based on a thorough discussion of this condition and the management approach to it "(including a comprehensive discussion of the known risks, side effects and potential benefits of treatment), the patient (family) agrees to implement the following specific plan:    Monitor for changes  Benign, reassured      Assessment and Plan:    Cherry angioma, also known as Clarence Coventry spots, are benign vascular skin lesions  A \"cherry angioma\" is a firm red, blue or purple papule, 0 1-1 cm in diameter  When thrombosed, they can appear black in colour until evaluated with a dermatoscope when the red or purple colour is more easily seen  Cherry angioma may develop on any part of the body but most often appear on the scalp, face, lips and trunk  An angioma is due to proliferating endothelial cells; these are the cells that line the inside of a blood vessel  Angiomas can arise in early life or later in life; the most common type of angioma is a cherry angioma  Cherry angiomas are very common in males and females of any age or race  They are more noticeable in white skin than in skin of colour  They markedly increase in number from about the age of 36  There may be a family history of similar lesions  Eruptive cherry angiomas have been rarely reported to be associated with internal malignancy  The cause of angiomas is unknown  Genetic analysis of cherry angiomas has shown that they frequently carry specific somatic missense mutations in the GNAQ and GNA11 (Q209H) genes, which are involved in other vascular and melanocytic proliferations        SEBORRHEIC KERATOSIS; NON-INFLAMED      Assessment and Plan:  Based on a thorough discussion of this condition and the management approach to it (including a comprehensive discussion of the known risks, side effects and potential benefits of treatment), the patient (family) agrees to implement the following specific plan:    Monitor for changes  Benign, reassured      Seborrheic Keratosis  A seborrheic keratosis is a harmless warty spot that appears during adult " "life as a common sign of skin aging  Seborrheic keratoses can arise on any area of skin, covered or uncovered, with the usual exception of the palms and soles  They do not arise from mucous membranes  Seborrheic keratoses can have highly variable appearance  Seborrheic keratoses are extremely common  It has been estimated that over 90% of adults over the age of 61 years have one or more of them  They occur in males and females of all races, typically beginning to erupt in the 35s or 45s  They are uncommon under the age of 21 years  The precise cause of seborrhoeic keratoses is not known  Seborrhoeic keratoses are considered degenerative in nature  As time goes by, seborrheic keratoses tend to become more numerous  Some people inherit a tendency to develop a very large number of them; some people may have hundreds of them  There is no easy way to remove multiple lesions on a single occasion  Unless a specific lesion is \"inflamed\" and is causing pain or stinging/burning or is bleeding, most insurance companies do not authorize treatment  NEOPLASM OF UNCERTAIN BEHAVIOR OF SKIN      Assessment and Plan:  I have discussed with the patient that a sample of skin via a \"skin biopsy” would be potentially helpful to further make a specific diagnosis under the microscope  Based on a thorough discussion of this condition and the management approach to it (including a comprehensive discussion of the known risks, side effects and potential benefits of treatment), the patient (family) agrees to implement the following specific plan:    Procedure:  Skin Biopsy  After a thorough discussion of treatment options and risk/benefits/alternatives (including but not limited to local pain, scarring, dyspigmentation, blistering, possible superinfection, and inability to confirm a diagnosis via histopathology), verbal and written consent were obtained and portion of the rash was biopsied for tissue sample    See below for " "consent that was obtained from patient and subsequent Procedure Note  PROCEDURE TANGENTIAL (SHAVE) BIOPSY NOTE:    Performing Physician: Laila Peralta        After obtaining informed consent  at which time there was a discussion about the purpose of biopsy  and low risks of infection and bleeding  The area was prepped and draped in the usual fashion  Anesthesia was obtained with 1% lidocaine with epinephrine  A shave biopsy to an appropriate sampling depth was obtained by Shave (Dermablade or 15 blade) The resulting wound was covered with surgical ointment and bandaged appropriately  The patient tolerated the procedure well without complications and was without signs of functional compromise  Specimen has been sent for review by Dermatopathology  Standard post-procedure care has been explained and has been included in written form within the patient's copy of Informed Consent  INFORMED CONSENT DISCUSSION AND POST-OPERATIVE INSTRUCTIONS FOR PATIENT    I   RATIONALE FOR PROCEDURE  I understand that a skin biopsy allows the Dermatologist to test a lesion or rash under the microscope to obtain a diagnosis  It usually involves numbing the area with numbing medication and removing a small piece of skin; sometimes the area will be closed with sutures  In this specific procedure, sutures are not usually needed  If any sutures are placed, then they are usually need to be removed in 2 weeks or less  I understand that my Dermatologist recommends that a skin \"shave\" biopsy be performed today  A local anesthetic, similar to the kind that a dentist uses when filling a cavity, will be injected with a very small needle into the skin area to be sampled  The injected skin and tissue underneath \"will go to sleep” and become numb so no pain should be felt afterwards    An instrument shaped like a tiny \"razor blade\" (shave biopsy instrument) will be used to cut a small piece of tissue and skin from the area so that " "a sample of tissue can be taken and examined more closely under the microscope  A slight amount of bleeding will occur, but it will be stopped with direct pressure and a pressure bandage and any other appropriate methods  I understands that a scar will form where the wound was created  Surgical ointment will be applied to help protect the wound  Sutures are not usually needed  II   RISKS AND POTENTIAL COMPLICATIONS   I understand the risks and potential complications of a skin biopsy include but are not limited to the following:  Bleeding  Infection  Pain  Scar/keloid  Skin discoloration  Incomplete Removal  Recurrence  Nerve Damage/Numbness/Loss of Function  Allergic Reaction to Anesthesia  Biopsies are diagnostic procedures and based on findings additional treatment or evaluation may be required  Loss or destruction of specimen resulting in no additional findings    My Dermatologist has explained to me the nature of the condition, the nature of the procedure, and the benefits to be reasonably expected compared with alternative approaches  My Dermatologist has discussed the likelihood of major risks or complications of this procedure including the specific risks listed above, such as bleeding, infection, and scarring/keloid  I understand that a scar is expected after this procedure  I understand that my physician cannot predict if the scar will form a \"keloid,\" which extends beyond the borders of the wound that is created  A keloid is a thick, painful, and bumpy scar  A keloid can be difficult to treat, as it does not always respond well to therapy, which includes injecting cortisone directly into the keloid every few weeks  While this usually reduces the pain and size of the scar, it does not eliminate it  I understand that photographs may be taken before and after the procedure  These will be maintained as part of the medical providers confidential records and may not be made available to me    " "I further authorize the medical provider to use the photographs for teaching purposes or to illustrate scientific papers, books, or lectures if in his/her judgment, medical research, education, or science may benefit from its use  I have had an opportunity to fully inquire about the risks and benefits of this procedure and its alternatives  I have been given ample time and opportunity to ask questions and to seek a second opinion if I wished to do so  I acknowledge that there have specifically been no guarantees as to the cosmetic results from the procedure  I am aware that with any procedure there is always the possibility of an unexpected complication  III  POST-PROCEDURAL CARE (WHAT YOU WILL NEED TO DO \"AFTER THE BIOPSY\" TO OPTIMIZE HEALING)    Keep the area clean and dry  Try NOT to remove the bandage or get it wet for the first 24 hours  Gently clean the area and apply surgical ointment (such as Vaseline petrolatum ointment, which is available \"over the counter\" and not a prescription) to the biopsy site for up to 2 weeks straight  This acts to protect the wound from the outside world  Sutures are not usually placed in this procedure  If any sutures were placed, return for suture removal as instructed (generally 1 week for the face, 2 weeks for the body)  Take Acetaminophen (Tylenol) for discomfort, if no contraindications  Ibuprofen or aspirin could make bleeding worse  Call our office immediately for signs of infection: fever, chills, increased redness, warmth, tenderness, discomfort/pain, or pus or foul smell coming from the wound  WHAT TO DO IF THERE IS ANY BLEEDING? If a small amount of bleeding is noticed, place a clean cloth over the area and apply firm pressure for ten minutes  Check the wound after 10 minutes of direct pressure  If bleeding persists, try one more time for an additional 10 minutes of direct pressure on the area    If the bleeding becomes heavier or " does not stop after the second attempt, or if you have any other questions about this procedure, then please call your [facility]atologist by calling [phone] (SKIN)  I hereby acknowledge that I have reviewed and verified the site with my Dermatologist and have requested and authorized my Dermatologist to proceed with the procedure  DIGITAL MUCOUS CYST      Assessment and Plan:   Based on a thorough discussion of this condition and the management approach to it (including a comprehensive discussion of the known risks, side effects and potential benefits of treatment), the patient (family) agrees to implement the following specific plan:     referral placed for orthopedic surgeon - patient will call and schedule and appointment with his current orthopedic doctor if he treats these  What is a digital mucous cyst?  Digital mucous cysts, also known as digital myxoid pseudocysts, are shiny papules found at the end of a finger or toe, close to the nail  It is called a pseudocyst because it is not surrounded by a capsule, unlike a true cyst  It is also called a myxoid cyst, a mucous cyst, a digital ganglion cyst, and a digital synovial cyst  They usually occur on the hands, although they have also been noted on the toes  Often, these cysts are asymptomatic and do not require treatment  What causes digital mucous cysts? The cyst arises from degeneration in the connective tissue on the top of the last segment of the finger  There appear to be two variations  The first is a form of focal mucinosis, a condition characterized by abnormal deposits of mucopolysaccharides (mucins) in the skin  The other variation arises from extension of the lining of the finger joint and is due to osteoarthritis - a type of ganglion  What are the symptoms of a digital mucous cyst?     The digital pseudocyst is semi-translucent, with a smooth shiny surface   It is most often located within a centimeter of the base of the nail  It often causes a groove in the nail  This may be up to several millimeter across and extends the length of the nail  Jelly-like sticky fluid may be expressed from the pseudocyst (sometimes tinged with blood)  What is the treatment for digital mucous cysts? Treatments which may be successful for digital myxoid pseudocyst include:  Repeatedly pressing firmly on the cyst  Squeezing out its contents (make a hole with a sterile needle)  Cryotherapy (freezing)  Steroid injection  Sclerosant injection  Surgical removal  Unfortunately, digital myxoid pseudocysts often recur despite treatment

## 2023-04-03 ENCOUNTER — TELEPHONE (OUTPATIENT)
Dept: DERMATOLOGY | Facility: CLINIC | Age: 73
End: 2023-04-03

## 2023-04-03 NOTE — RESULT ENCOUNTER NOTE
Team- please schedule [facility] for cryotherapy to biopsy site  DERMATOPATHOLOGY RESULT NOTE    Results reviewed by ordering physician  Called patient to personally discuss results  Discussed results with patient  Precancerous nature reviewed  Plan for cryotherapy to biopsy site to ensure clear due to positive margins  Instructions for Clinical Derm Team:   (remember to route Result Note to appropriate staff):    Call patient and schedule for [facility] for cryotherapy    Result & Plan by Specimen:    Specimen A: indeterminate; AK present at margins  Plan: clinic appointment for liquid nitrogen    0 Result Notes    1 Follow-up Encounter  Component   Case Report  Surgical Pathology Report                         Case: [id number]                                   Authorizing Provider: Margie Bal MD     Collected:           03/28/2023 1221              Ordering Location:     [facility]      Received:            03/28/2023 1222                                   [address] [facility]                                                                Pathologist:           Margie Bal MD                                                       Specimen:    Skin, Other, Specimen A Central frontal scalp                                            Final Diagnosis  A   Skin, Central frontal scalp, Shave biopsy:  Hypertrophic actinic keratosis; extending to margins      Electronically signed by Margie Bal MD on 4/3/2023 at  8:12 AM  Additional Information   All reported additional testing was performed with appropriately reactive controls   These tests were developed and their performance characteristics determined by [facility] or appropriate performing facility, though some tests may be performed on tissues which have not been validated for performance characteristics (such as staining performed on alcohol exposed cell blocks and decalcified tissues)   Results should be interpreted with "caution and in the context of the patients' clinical condition  These tests may not be cleared or approved by the [place]  Food and Drug Administration, though the FDA has determined that such clearance or approval is not necessary  These tests are used for clinical purposes and they should not be regarded as investigational or for research  This laboratory has been approved by [place] 88, designated as a high-complexity laboratory and is qualified to perform these tests  Yvonne Last Description   A  The specimen is received in formalin, labeled with the patient's name and hospital number, and is designated \" central frontal scalp\"  The specimen consists of 1 tan hairbearing shave of skin measuring 0 7 x 0 5 by less than 0 1 cm  The surface exhibits a keratotic flesh-colored papule measuring 0 4 x 0 4 x 0 2 cm and comes within 0 1 cm of peripheral margin  The margin of resection is inked green, and the surface tips are inked red  Entirely submitted  One cassette, bisected between sponges      Note: The estimated total formalin fixation time based upon information provided by the submitting clinician and the standard processing schedule is under 72 hours    MSequino  Clinical Information   Specimen A Central frontal scalp; skin; shave biopsy; 0 4 cm crusted red papule; ddx; actinic keratosis versus squamous cell carcinoma     Attn [facility] - Abhilash          "

## 2023-05-02 ENCOUNTER — OFFICE VISIT (OUTPATIENT)
Dept: PODIATRY | Facility: CLINIC | Age: 73
End: 2023-05-02

## 2023-05-02 VITALS
HEART RATE: 78 BPM | WEIGHT: 145 LBS | SYSTOLIC BLOOD PRESSURE: 120 MMHG | HEIGHT: 68 IN | DIASTOLIC BLOOD PRESSURE: 74 MMHG | BODY MASS INDEX: 21.98 KG/M2

## 2023-05-02 DIAGNOSIS — M67.449 DIGITAL MUCOUS CYST: ICD-10-CM

## 2023-05-02 DIAGNOSIS — M20.22 HALLUX RIGIDUS OF LEFT FOOT: ICD-10-CM

## 2023-05-02 DIAGNOSIS — D49.2: Primary | ICD-10-CM

## 2023-05-11 ENCOUNTER — APPOINTMENT (OUTPATIENT)
Dept: RADIOLOGY | Facility: CLINIC | Age: 73
End: 2023-05-11

## 2023-05-11 DIAGNOSIS — M54.51 VERTEBROGENIC LOW BACK PAIN: ICD-10-CM

## 2023-05-27 NOTE — PROGRESS NOTES
Assessment/Plan:  Discussed probable diagnosis with patient in detail, surgical treatment options which would include removal of nail assuming it is a mucinous cyst   Conservative approach recommended at this time and patient is in agreement with that  Should any changes in size, color, or other characteristic occur patient should follow-up and we will perform a punch biopsy of the lesion  For degenerative arthritis left great toe joint discussed, recommend OTC anti-inflammatories for now  If issues persist will get x-rays on follow-up  Follow-up as needed  No problem-specific Assessment & Plan notes found for this encounter  Diagnoses and all orders for this visit:    Hallux rigidus of left foot    Neoplasm of skin of toe  Digital mucous cyst  -     Ambulatory Referral to Orthopedic Surgery        Subjective:      Patient ID: Letty Sanabria is a 81 y o  male  73-year-old male the right  Has been present for over a year  Secondary concern diminished range of motion in palpable exostosis left great toe joint  Has been present for quite some time  Denies any significantly limiting pain/discomfort  The following portions of the patient's history were reviewed and updated as appropriate: allergies, current medications, past family history, past medical history, past social history, past surgical history and problem list     Review of Systems   Constitutional: Negative for chills and fever  HENT: Negative for ear pain and sore throat  Eyes: Negative for pain and visual disturbance  Respiratory: Negative for cough and shortness of breath  Cardiovascular: Negative for chest pain and palpitations  Gastrointestinal: Negative for abdominal pain and vomiting  Genitourinary: Negative for dysuria and hematuria  Musculoskeletal: Positive for joint swelling (Left great toe joint)  Negative for arthralgias and back pain  Skin: Negative for color change and rash          Cyst which has "recently drained from right second toe proximal nailbed margin  Neurological: Negative for seizures and syncope  All other systems reviewed and are negative  Objective:      /74   Pulse 78   Ht 5' 8\" (1 727 m)   Wt 65 8 kg (145 lb)   BMI 22 05 kg/m²              Physical Exam  Constitutional:       Appearance: Normal appearance  HENT:      Head: Normocephalic  Right Ear: Tympanic membrane normal       Left Ear: Tympanic membrane normal       Nose: No congestion  Mouth/Throat:      Pharynx: No oropharyngeal exudate or posterior oropharyngeal erythema  Eyes:      Extraocular Movements: Extraocular movements intact  Pupils: Pupils are equal, round, and reactive to light  Cardiovascular:      Rate and Rhythm: Normal rate and regular rhythm  Pulses:           Dorsalis pedis pulses are 1+ on the right side and 1+ on the left side  Posterior tibial pulses are 2+ on the right side and 2+ on the left side  Pulmonary:      Effort: Pulmonary effort is normal    Musculoskeletal:         General: Tenderness present  Comments: Diminished range of motion left first MPJ, palpable dorsal exostosis  Feet:      Right foot:      Protective Sensation: 10 sites tested  10 sites sensed  Left foot:      Protective Sensation: 10 sites tested  10 sites sensed  Skin:     General: Skin is warm and dry  Capillary Refill: Capillary refill takes 2 to 3 seconds  Coloration: Skin is not pale  Findings: No bruising, erythema, lesion or rash  Comments: 0 4 x 0 5 x 0 1 cm raised lesion overlying proximal lateral DIPJ second toe right foot at proximal nail margin  Neurological:      General: No focal deficit present  Mental Status: He is alert  Cranial Nerves: No cranial nerve deficit  Sensory: No sensory deficit  Motor: No weakness        Gait: Gait normal       Deep Tendon Reflexes: Reflexes normal    Psychiatric:         Mood and Affect: " Mood normal          Behavior: Behavior normal          Judgment: Judgment normal

## 2023-06-16 ENCOUNTER — OFFICE VISIT (OUTPATIENT)
Dept: GASTROENTEROLOGY | Facility: CLINIC | Age: 73
End: 2023-06-16
Payer: COMMERCIAL

## 2023-06-16 VITALS
WEIGHT: 147 LBS | SYSTOLIC BLOOD PRESSURE: 120 MMHG | HEIGHT: 68 IN | DIASTOLIC BLOOD PRESSURE: 80 MMHG | BODY MASS INDEX: 22.28 KG/M2

## 2023-06-16 DIAGNOSIS — Z86.010 PERSONAL HISTORY OF COLONIC POLYPS: ICD-10-CM

## 2023-06-16 DIAGNOSIS — R19.4 CHANGE IN BOWEL HABIT: Primary | ICD-10-CM

## 2023-06-16 PROCEDURE — 99214 OFFICE O/P EST MOD 30 MIN: CPT | Performed by: INTERNAL MEDICINE

## 2023-06-16 NOTE — PATIENT INSTRUCTIONS
Start a daily fiber supplement like Metamucil powder once daily  If you have excessive bloating with this okay to reduce to a half dose    If you prefer a more natural product, you can purchase psyllium husk at a health food store  Obtain blood work at [facility]   I will contact you with these results, if symptoms fail to improve with the Metamucil will discuss colonoscopy

## 2023-06-16 NOTE — PROGRESS NOTES
[id number] - Outpatient Follow-up Note  Choco Valdivia 81 y o  male MRN: [id number]  Encounter: [id number]    ASSESSMENT AND PLAN:      1  Change in bowel habit  About 1 month of smaller, narrower stools along with increased gas  No recent antibiotics, dietary or medication changes  Discussed options, we will start conservatively with inflammatory labs and TSH  He will start Metamucil daily  If symptoms fail to improve and lab studies are unrevealing, will plan colonoscopy     2  Personal history of colonic polyps  Most recent colonoscopy July 2020 was negative, 5-year recall planned  May need diagnostic colonoscopy sooner if symptoms persist    Followup Appointment: Pending labs  ______________________________________________________________________    Chief Complaint   Patient presents with   • change in bowel movements     Not as frequent and stool size is different, more gas     HPI: The patient presents for evaluation of a change in bowel habits  He was last seen at the time of a surveillance colonoscopy July 2020 with Dr Yamilet Sellers that showed internal and external hemorrhoids  Over the past month he has had increased stool urgency, smaller BMs which are occasionally thin  He moves his bowels 2-3 times per day which is his baseline but stools are more urgent  He is at increased gas and bloating  He denies any nocturnal symptoms  He denies any recent antibiotics  He has had no GI illnesses  He denies any dietary or medication changes      Historical Information   Past Medical History:   Diagnosis Date   • Hypertension    • Prostatism    • 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[facility])      Past Surgical History:   Procedure Laterality Date   • COLONOSCOPY     • SKIN BIOPSY       Social History     Substance and Sexual Activity   Alcohol Use Not Currently    Comment: 1-4 weekly     Social History     Substance and Sexual Activity   Drug Use No     Social History "    Tobacco Use   Smoking Status Never   Smokeless Tobacco Never     History reviewed  No pertinent family history  Current Outpatient Medications:   •  Ascorbic Acid 500 MG CAPS  •  losartan (COZAAR) 25 mg tablet  •  multivitamin (THERAGRAN) TABS  No Known Allergies  Reviewed medications and allergies and updated as indicated    PHYSICAL EXAM:    Blood pressure 120/80, height 5' 8\" (1 727 m), weight 66 7 kg (147 lb)  Body mass index is 22 35 kg/m²  General Appearance: NAD, cooperative, alert  Eyes: Anicteric, PERRLA, EOMI  ENT:  Normocephalic, atraumatic, normal mucosa  Neck:  Supple, symmetrical, trachea midline  Resp:  Clear to auscultation bilaterally; no rales, rhonchi or wheezing; respirations unlabored   CV:  S1 S2, Regular rate and rhythm; no murmur, rub, or gallop  GI:  Soft, non-tender, non-distended; normal bowel sounds; no masses, no organomegaly   Rectal: Deferred  Musculoskeletal: No cyanosis, clubbing or edema  Normal ROM  Skin:  No jaundice, rashes, or lesions   Heme/Lymph: No palpable cervical lymphadenopathy  Psych: Normal affect, good eye contact  Neuro: No gross deficits, AAOx3    Lab Results:   No results found for: \"WBC\", \"HGB\", \"HCT\", \"MCV\", \"PLT\"  No results found for: \"NA\", \"K\", \"CL\", \"CO2\", \"ANIONGAP\", \"BUN\", \"CREATININE\", \"GLUCOSE\", \"GLUF\", \"CALCIUM\", \"CORRECTEDCA\", \"AST\", \"ALT\", \"ALKPHOS\", \"PROT\", \"BILITOT\", \"EGFR\"  No results found for: \"IRON\", \"TIBC\", \"FERRITIN\"  No results found for: \"LIPASE\"    Radiology Results:   No results found    "

## 2023-06-21 ENCOUNTER — NURSE TRIAGE (OUTPATIENT)
Age: 73
End: 2023-06-21

## 2023-06-21 NOTE — TELEPHONE ENCOUNTER
Patient would like labslips emailed to him today for appointment tomorrow  [email or web address]

## 2023-06-24 LAB
CRP SERPL-MCNC: <1 MG/L (ref 0–10)
ENDOMYSIUM IGA SER QL: NEGATIVE
ERYTHROCYTE [SEDIMENTATION RATE] IN BLOOD BY WESTERGREN METHOD: 2 MM/HR (ref 0–30)
GLIADIN PEPTIDE IGA SER-ACNC: 4 UNITS (ref 0–19)
GLIADIN PEPTIDE IGG SER-ACNC: 8 UNITS (ref 0–19)
IGA SERPL-MCNC: 236 MG/DL (ref 61–437)
TSH SERPL DL<=0.005 MIU/L-ACNC: 2.31 UIU/ML (ref 0.45–4.5)
TTG IGA SER-ACNC: <2 U/ML (ref 0–3)
TTG IGG SER-ACNC: 6 U/ML (ref 0–5)

## 2023-06-26 ENCOUNTER — TELEPHONE (OUTPATIENT)
Dept: GASTROENTEROLOGY | Facility: CLINIC | Age: 73
End: 2023-06-26

## 2023-06-26 NOTE — TELEPHONE ENCOUNTER
Called patient with lab results  Weakly positive tissue transglutaminase IgG    Symptoms gradually improving on Metamucil    Please cancel his visit with Stevie Baumgarten this week and reschedule a visit with me in 4 to 6 weeks  Thanks

## 2023-07-11 ENCOUNTER — OFFICE VISIT (OUTPATIENT)
Dept: DERMATOLOGY | Facility: CLINIC | Age: 73
End: 2023-07-11
Payer: COMMERCIAL

## 2023-07-11 VITALS — BODY MASS INDEX: 22.13 KG/M2 | WEIGHT: 146 LBS | TEMPERATURE: 98.1 F | HEIGHT: 68 IN

## 2023-07-11 DIAGNOSIS — B07.9 VERRUCA VULGARIS: ICD-10-CM

## 2023-07-11 DIAGNOSIS — L57.0 KERATOSIS, ACTINIC: Primary | ICD-10-CM

## 2023-07-11 PROCEDURE — 17110 DESTRUCTION B9 LES UP TO 14: CPT | Performed by: DERMATOLOGY

## 2023-07-11 PROCEDURE — 17000 DESTRUCT PREMALG LESION: CPT | Performed by: DERMATOLOGY

## 2023-07-11 NOTE — PROGRESS NOTES
[facility] Note     Patient Name: Jailene Dewey  Encounter Date: 7/11/2023     Have you been cared for by a [facility]ologist in the last 3 years and, if so, which description applies to you? Yes. I have been here within the last 3 years, and my medical history has NOT changed since that time. I am MALE/not capable of bearing children. REVIEW OF SYSTEMS:  Have you recently had or currently have any of the following? · No changes in my recent health. PAST MEDICAL HISTORY:  Have you personally ever had or currently have any of the following? If "YES," then please provide more detail. · No changes in my medical history. FAMILY HISTORY:  Any "first degree relatives" (parent, brother, sister, or child) with the following? • No changes in my family's known health. PATIENT EXPERIENCE:    • Do you want the Dermatologist to perform a COMPLETE skin exam today including a clinical examination under the "bra and underwear" areas? NO  • If necessary, do we have your permission to call and leave a detailed message on your Preferred Phone number that includes your specific medical information?   Yes      No Known Allergies   Current Outpatient Medications:   •  Ascorbic Acid 500 MG CAPS, Take 1,000 mg by mouth 2 (two) times a day  , Disp: , Rfl:   •  losartan (COZAAR) 25 mg tablet, Take 1 tablet (25 mg total) by mouth in the morning., Disp: 90 tablet, Rfl: 3  •  multivitamin (THERAGRAN) TABS, Take 1 tablet by mouth daily , Disp: , Rfl:           • Whom besides the patient is providing clinical information about today's encounter?   o NO ADDITIONAL HISTORIAN (patient alone provided history)    Physical Exam and Assessment/Plan by Diagnosis:        ACTINIC KERATOSIS: BIOPSY PROVEN  - Relevant exam: On the central frontal scalp scalp are scaly pink macules surrounding biopsy scar without palpable dermal component     1 Result Note     1 Patient Communication     1 Follow-up Encounter Component    Case Report   Surgical Pathology Report                         Case: [id number]                                    Authorizing Provider: Ivett Mckeon MD     Collected:           03/28/2023 1221               Ordering Location:     [facility]      Received:            03/28/2023 1222                                    [address] [facility]                                                                 Pathologist:           Ivett Mckeon MD                                                        Specimen:    Skin, Other, Specimen A Central frontal scalp                                              Final Diagnosis   A. Skin, Central frontal scalp, Shave biopsy:  Hypertrophic actinic keratosis; extending to margins.      Electronically signed by Ivett Mckeon MD on 4/3/2023 at  8:12 AM            - Exam and clinical history consistent with actinic keratoses- area was biopsied 3/28/2023 and diagnosis confirmed  - Discussed that these lesions are considered premalignant with the potential to evolve into squamous cell carcinoma. - Discussed that these are due to chronic sun exposure and therefore recommend use of sunscreen/sun protection to prevent further sun damage  - Discussed treatment options including risks, benefits  - Patient counseled to return to the office in 4-6 weeks after completion of treatment for recheck if not resolved at which time retreatment or biopsy to rule out SCC will be determined based on clinic findings        PROCEDURE:  DESTRUCTION OF PRE-MALIGNANT LESIONS    - After a thorough discussion of treatment options and risk/benefits/alternatives (including but not limited to local pain, scarring, dyspigmentation, blistering, and possible superinfection), verbal and written consent were obtained and the aforementioned lesions were treated on with cryotherapy using liquid nitrogen x 1 cycle for 5-10 seconds.     • TOTAL NUMBER of 1 pre-malignant lesions were treated today on the ANATOMIC LOCATION: central frontal scalp. The patient tolerated the procedure well, and after-care instructions were provided. VERRUCA VULGARIS ("Common Warts")    Physical Exam:  • Anatomic Location Affected:  Left elbow   • Morphological Description:  3 mm verrucous papule  • Pertinent Positives:  • Pertinent Negatives: Additional History of Present Condition:  Present on exam     Assessment and Plan:  Based on a thorough discussion of this condition and the management approach to it (including a comprehensive discussion of the known risks, side effects and potential benefits of treatment), the patient (family) agrees to implement the following specific plan:  • Treated in office with cryotherapy in office with signed consent  • Discussed can apply Vaseline to area     Verruca Vulgaris  A verruca is a common growth of the skin caused by infection by human papilloma virus (HPV). There are many strains of the virus that cause different types of warts on the body. The virus infects the most superficial layers of the skin, causing increased production of skin cells and thickening. Warts can be spread through direct contact with infected skin and may spread to other parts of the body if scratched or picked. A verruca is more commonly called a "wart." Warts are particularly common in school-aged children but can arise at any age. Patients who have a history of eczema are especially prone due to impaired skin barrier. Those taking immunosuppressive drugs or with HIV infections may experience prolonged symptoms despite treatment. Warts generally have a rough surface with a tiny black dot sometimes observed in the middle of each scaly spot. They can range in size from a small bump to large scaly growths. Common warts are often found on the backs of fingers or toes, around the nails, and on the knees. Plantar warts can grow inwardly on the soles of the feet causing pain.     There are many possible ways to treat warts and sometimes several different treatments are needed to get the warts to go away completely. There is no single perfect treatment for warts, and successful treatment can take many months. In-office treatments usually require multiple visits, and include:  1) Cryotherapy. a cold spray with liquid nitrogen will destroy the infected cells but may lead to discomfort and blistering. It may also leave a permanent white suni or scar. 2) Electrosurgery (curettage and cautery) can be used for large resistant warts which involves shaving the growth down and burning the base. It is performed under local anesthesia and may leave a permanent scar    3) Candida (“yeast”) antigen injections. These are extracts of the common yeast (Candida) that cannot cause an infection. The medication is injected into/under the wart. It is thought to stimulate the immune system to recognize the wart virus and attack it. Multiple injections are often needed about one month apart. There are also several at-home wart treatments:    1) Soak the warts in warm water for 5 minutes every night followed by gentle filing with a nail file or pumice stone. 2) Topical salicylic acid or similar compounds work by removing the dead surface skin cells  a. Apply the medicine directly to the wart, wait for it to dry completely, then cover with duct tape overnight   b. Repeat until the wart is gone, which can take 2-4 months  c. Do not use on the face or groin area   d. If the wart paint makes the skin sore, stop treatment until the discomfort has settled, then recommence as above.  e. Take care to keep the chemical off normal skin. 3) Podophyllin is a cytotoxic agent used in some products and must not be used in pregnancy or women considering pregnancy. 4) Some prescription medications include   a.  Topical retinoids (adapalene, tretinoin, tazarotene), 5-fluorouracil (Efudex) or imiquimod (Aldara) creams are sometimes used to treat flat warts or warts on the face and other sensitive anatomical areas. They are usually applied directly to the warts once a day for 2-4 months and can be irritating. These treatments should only be used as directed by your health care provider. b. Systemic treatment with oral cimetidine (Tagamet) may help boost the immune system against the wart virus in patients, some of the time. Initiation of cimetidine therapy should ONLY be done under the supervision of your health care provider, who can discuss possible side effects and drug-to-drug interactions of this specific treatment.        Scribe Attestation    I,:  Jaxon Bell am acting as a scribe while in the presence of the attending physician.:       I,:  Marta Billingsley MD personally performed the services described in this documentation    as scribed in my presence.:

## 2023-07-11 NOTE — PATIENT INSTRUCTIONS
ACTINIC KERATOSES  - Relevant exam: On the scalp are scaly pink macules without palpable dermal component    - Exam and clinical history consistent with actinic keratoses- area was biopsied 3/28/2023  - Discussed that these lesions are considered premalignant with the potential to evolve into squamous cell carcinoma. - Discussed that these are due to chronic sun exposure and therefore recommend use of sunscreen/sun protection to prevent further sun damage  - Discussed treatment options including risks, benefits  - Patient counseled to return to the office in 4-6 weeks after completion of treatment for recheck if not resolved at which time retreatment or biopsy to rule out SCC will be determined based on clinic findings        PROCEDURE:  DESTRUCTION OF PRE-MALIGNANT LESIONS    - After a thorough discussion of treatment options and risk/benefits/alternatives (including but not limited to local pain, scarring, dyspigmentation, blistering, and possible superinfection), verbal and written consent were obtained and the aforementioned lesions were treated on with cryotherapy using liquid nitrogen x 1 cycle for 5-10 seconds. TOTAL NUMBER of 1 pre-malignant lesions were treated today on the ANATOMIC LOCATION: central frontal scalp. The patient tolerated the procedure well, and after-care instructions were provided. VERRUCA VULGARIS ("Common Warts")      Assessment and Plan:  Based on a thorough discussion of this condition and the management approach to it (including a comprehensive discussion of the known risks, side effects and potential benefits of treatment), the patient (family) agrees to implement the following specific plan:  Treated in office with cryotherapy in office with signed consent  Discussed can apply Vaseline to area     Verruca Vulgaris  A verruca is a common growth of the skin caused by infection by human papilloma virus (HPV).  There are many strains of the virus that cause different types of warts on the body. The virus infects the most superficial layers of the skin, causing increased production of skin cells and thickening. Warts can be spread through direct contact with infected skin and may spread to other parts of the body if scratched or picked. A verruca is more commonly called a "wart." Warts are particularly common in school-aged children but can arise at any age. Patients who have a history of eczema are especially prone due to impaired skin barrier. Those taking immunosuppressive drugs or with HIV infections may experience prolonged symptoms despite treatment. Warts generally have a rough surface with a tiny black dot sometimes observed in the middle of each scaly spot. They can range in size from a small bump to large scaly growths. Common warts are often found on the backs of fingers or toes, around the nails, and on the knees. Plantar warts can grow inwardly on the soles of the feet causing pain. There are many possible ways to treat warts and sometimes several different treatments are needed to get the warts to go away completely. There is no single perfect treatment for warts, and successful treatment can take many months. In-office treatments usually require multiple visits, and include:  Cryotherapy. a cold spray with liquid nitrogen will destroy the infected cells but may lead to discomfort and blistering. It may also leave a permanent white suni or scar. Electrosurgery (curettage and cautery) can be used for large resistant warts which involves shaving the growth down and burning the base. It is performed under local anesthesia and may leave a permanent scar    Candida (“yeast”) antigen injections. These are extracts of the common yeast (Candida) that cannot cause an infection. The medication is injected into/under the wart. It is thought to stimulate the immune system to recognize the wart virus and attack it.  Multiple injections are often needed about one month apart. There are also several at-home wart treatments:    Soak the warts in warm water for 5 minutes every night followed by gentle filing with a nail file or pumice stone. Topical salicylic acid or similar compounds work by removing the dead surface skin cells  Apply the medicine directly to the wart, wait for it to dry completely, then cover with duct tape overnight   Repeat until the wart is gone, which can take 2-4 months  Do not use on the face or groin area   If the wart paint makes the skin sore, stop treatment until the discomfort has settled, then recommence as above. Take care to keep the chemical off normal skin. Podophyllin is a cytotoxic agent used in some products and must not be used in pregnancy or women considering pregnancy. Some prescription medications include   Topical retinoids (adapalene, tretinoin, tazarotene), 5-fluorouracil (Efudex) or imiquimod (Aldara) creams are sometimes used to treat flat warts or warts on the face and other sensitive anatomical areas. They are usually applied directly to the warts once a day for 2-4 months and can be irritating. These treatments should only be used as directed by your health care provider. Systemic treatment with oral cimetidine (Tagamet) may help boost the immune system against the wart virus in patients, some of the time. Initiation of cimetidine therapy should ONLY be done under the supervision of your health care provider, who can discuss possible side effects and drug-to-drug interactions of this specific treatment.

## 2023-07-19 ENCOUNTER — DOCUMENTATION (OUTPATIENT)
Dept: CARDIOLOGY CLINIC | Facility: CLINIC | Age: 73
End: 2023-07-19

## 2023-07-19 DIAGNOSIS — R00.2 PALPITATIONS: Primary | ICD-10-CM

## 2023-07-19 NOTE — PROGRESS NOTES
GM, can you please verify to see if this patient needs a prior auth for 2 week XT luis,    Thank you!!!

## 2023-07-20 ENCOUNTER — DOCUMENTATION (OUTPATIENT)
Dept: CARDIOLOGY CLINIC | Facility: CLINIC | Age: 73
End: 2023-07-20

## 2023-08-03 ENCOUNTER — OFFICE VISIT (OUTPATIENT)
Dept: GASTROENTEROLOGY | Facility: CLINIC | Age: 73
End: 2023-08-03
Payer: COMMERCIAL

## 2023-08-03 VITALS
DIASTOLIC BLOOD PRESSURE: 80 MMHG | WEIGHT: 146 LBS | SYSTOLIC BLOOD PRESSURE: 132 MMHG | BODY MASS INDEX: 22.13 KG/M2 | HEIGHT: 68 IN

## 2023-08-03 DIAGNOSIS — K21.9 GASTROESOPHAGEAL REFLUX DISEASE WITHOUT ESOPHAGITIS: ICD-10-CM

## 2023-08-03 DIAGNOSIS — R19.4 CHANGE IN BOWEL HABITS: Primary | ICD-10-CM

## 2023-08-03 DIAGNOSIS — Z86.010 PERSONAL HISTORY OF COLONIC POLYPS: ICD-10-CM

## 2023-08-03 PROCEDURE — 99213 OFFICE O/P EST LOW 20 MIN: CPT | Performed by: INTERNAL MEDICINE

## 2023-08-03 NOTE — PROGRESS NOTES
[facility] - Outpatient Follow-up Note  Lisa Will 81 y.o. male MRN: [id number]  Encounter: [id number]    ASSESSMENT AND PLAN:      1. Change in bowel habits  Seen in June with about 1 month of narrower stools. Inflammatory labs were normal.  Celiac panel showed a weak positive tissue transglutaminase IgG. Symptoms resolved with Metamucil once daily  No immediate need for EGD with biopsy but he will contact me if GI symptoms recur    2. Gastroesophageal reflux   Intermittent reflux at night, resolved with elevation of the head of his bed. Occasionally takes Tums. Continue antireflux diet. He will contact me if symptoms progress and we can plan upper endoscopy    3. Personal history of colonic polyps  Most recent colonoscopy July 2020 was negative, 5-year recall planned. Followup Appointment: As needed  ______________________________________________________________________    Chief Complaint   Patient presents with   • Follow-up     No issues     HPI: The patient returns for follow-up on a change in bowel habits and GERD. He was last seen in the office June 16. At that time he complained of about 1 month of smaller narrower stools. We started Metamucil with immediate resolution of symptoms. We sent off inflammatory labs and a celiac panel. Tissue transglutaminase IgG was 6 with a normal level of 5. He denies any issues with gluten. He is very satisfied with symptom improvement. Previous had intermittent reflux that manifest at around 2 or 3 in the morning. He elevated the head of his bed and has been adhering to an antireflux diet with near total relief of symptoms. If he does have reflux complaints they resolve quickly with Tums. He denies any alarm symptoms.     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 No pertinent family history. Current Outpatient Medications:   •  Ascorbic Acid 500 MG CAPS  •  losartan (COZAAR) 25 mg tablet  •  multivitamin (THERAGRAN) TABS  No Known Allergies  Reviewed medications and allergies and updated as indicated    PHYSICAL EXAM:    Blood pressure 132/80, height 5' 8" (1.727 m), weight 66.2 kg (146 lb). Body mass index is 22.2 kg/m². General Appearance: NAD, cooperative, alert  Eyes: Anicteric, PERRLA, EOMI  ENT:  Normocephalic, atraumatic, normal mucosa. Neck:  Supple, symmetrical, trachea midline  Resp:  Clear to auscultation bilaterally; no rales, rhonchi or wheezing; respirations unlabored   CV:  S1 S2, Regular rate and rhythm; no murmur, rub, or gallop. GI:  Soft, non-tender, non-distended; normal bowel sounds; no masses, no organomegaly   Rectal: Deferred  Musculoskeletal: No cyanosis, clubbing or edema. Normal ROM. Skin:  No jaundice, rashes, or lesions   Heme/Lymph: No palpable cervical lymphadenopathy  Psych: Normal affect, good eye contact  Neuro: No gross deficits, AAOx3    Lab Results:   No results found for: "WBC", "HGB", "HCT", "MCV", "PLT"  No results found for: "NA", "K", "CL", "CO2", "ANIONGAP", "BUN", "CREATININE", "GLUCOSE", "GLUF", "CALCIUM", "CORRECTEDCA", "AST", "ALT", "ALKPHOS", "PROT", "BILITOT", "EGFR"  No results found for: "IRON", "TIBC", "FERRITIN"  No results found for: "LIPASE"    Radiology Results:   No results found.

## 2023-08-07 NOTE — PROGRESS NOTES
[address] Follow-up Visit - Cardiology   Jac Lerner 81 y.o. male MRN: [id number]  Unit/Bed#:  Encounter: [id number]    Patient Active Problem List    Diagnosis Date Noted   • 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([address]) 03/03/2020   • H/O unilateral orchiectomy 03/03/2020   • Hypertension 03/03/2020   • Palpitations 03/03/2020   • Left ventricular hypertrophy by electrocardiogram 10/02/2018   • HOCM (hypertrophic obstructive cardiomyopathy) ([address]) 10/02/2018     Plan: Since his last office visit on 2- he was seen by Dermatology for a lesion on central frontal scalp. This was biopsied and determined to be precancerous and was excised. He was also seen by GI for changes in bowel habits and celiac panel as well as inflammatory markers were negative. His bowel changes improved with addition of Metamucil. He does note some acid reflux type symptoms mostly at night that he feels is associated with specific foods. As it had been discussed with him in the past, we did again reiterate that his nightly symptoms of chest pain/palpitations may indeed be related to GERD and we have again encouraged him to reduce consumption of potentially provocative foods and consider use of an H2 blocker vs. PPI. Since his last visit, he says he has been having several cardiac complaints. He continues to note palpitations describing the feeling as "skipping beats". He notes this is becoming more frequent and happening daily but he notes it mostly intensely at night. He did just complete wearing a two week ZIO monitor that he mailed out over this weekend, we will have results in a few days. His TSH was WNL in June of this year. He also notes he is feeling more frequent lightheadedness as well. He claims he is well hydrated through the day. He is having chest discomfort as well but this is not brought on by exertion as he does exercise almost daily and exercising does not produce symptoms.  His blood pressure readings have been acceptable at home on losartan 25 mg daily. He has not been following up with Urology or Oncology anymore as he does not feel follow ups are necessary. We have encouraged him to continue exercise as well as following a heart healthy, low fat/cholesterol/sodium diet. For his symptoms, we will await the results recent ZIO monitor and we will also order exercise bike stress echo to evaluate intracavitary or LV outflow tract obstruction as the cause of his symptoms. Physician Requesting Consult: Primary Care  Reason for Consult / Principal Problem: Suspected hypertrophic cardiomyopathy     HPI: Lisa Ferrara is a 81y.o. year old retired  who presented with complaints of palpitation and chest discomfort. The symptoms began in 2014 and triggered work up that included pharmacologic myocardial perfusion imaging (2014) and echocardiogram (2014) that were reportedly negative and a Holter monitor (2014) that showed premature atrial and ventricular premature beats that did not correlate with the timing of his symptoms (2014). He continued to be symptomatic and was again evaluated in 2016 and 2018. Most recently, he had a 2-week ambulatory ECG monitor that also reportedly showed no high grade arrhythmias or significant pauses. He is healthy, physically fit with a BMI of 22.5 kg/m2, plays golf for 2 days and works part time for 3 days a week. His past medical history is significant for testicular cancer (seminoma) s/p right orchidectomy and recent diagnosis of prostate enlargement on a CT scan taken in follow up evaluation of his cancer. He does have symptoms of prostatism and in particular wakes up more than once at night to urinate. Mr Aldo Umana is quite concerned about his symptoms of palpitation, chest discomfort and occasional dizziness particularly after the hospitalization of her sister.  He completed an echocardiogram on 10- that showed normal LV size and systolic function with an ejection fraction of 60% and no regional wall motion abnormalities; no evidence of LVH, no significant valvular abnormality and borderline increase in the size of the aortic root (3.7 cm). He also had a treadmill exercise performed that showed excellent exercise tolerance (duration of exercise was 13 min and maximal work rate was 15.3 METs), reached a peak heart rate of 157 bpm (101 % of maximal predicted heart rate). He was, however, hypertensive at baseline (162/80) with appropriate blood pressure response to stress. There was no chest pain during stress, the stress ECG was normal and no significant arrhythmias were noted (isolated premature ventricular beats). His blood pressure was high during echocardiography as well. He does not add salt to his diet although he does eat out often, has maintained an ideal body weight and is quite active. He does have strong family history of hypertension. His lipid profile is being checked by his family physician. He was started on bisoprolol to treat high blood pressure and help with the sensation of skipped heart beats that was his main concern at that point.      On 1/29/2019: Mr Joni Cranker has done extremely well since last office visit. He has brought with him home blood pressure readings that range from 115 to 482 mmHg systolic and 70 to 80 mmHg diastolic. He denies chest pain, shortness of breath and dizziness and feels his palpitation has improved significantly. He exercises regularly and remains active, follows a heart healthy diet low in sodium and is compliant with his medications. I reviewed the medical records of his sister, Mina Asher, who has been evaluated at the [facility] in [place] and appears to me that she has a similar problem likely caused by systemic hypertension. In her evaluation, much emphasis has been given to the reportedly sudden death of their father at age 43 and to Mr Calle's diagnosis of HCM.  However, the circumstances of father's death is unclear and the contribution of excess alcohol intake may have been significant. Mr Alva's mild concentric left ventricular hypertrophy is also well explained by his systemic hypertension. Genetic testing at this point does not seem to be helpful considering the otherwise benign family history and mild phenotypic expression. His risk stratification is complete and he would not require further testing at this point.     On 5/21/2019: Mr Neetu Person has not tolerated the bisoprolol that was started for both hypertension and palpitation related to premature ventricular contractions. The medication was effective for both but resulted in insomnia, anxiety, chest pain, cold feet and hands, joint pain and most importantly problems getting and maintaining an erection. Even cutting the dose from 5 to 2.5 mg per day did not resolve the side effects and he stopped the medication altogether and noted rebound hypertension (140/90) and increased heart rate (resting heart rate of 90-95 bpm). He was then started on losartan that has improved blood pressure control but has not done much for the rapid heart rate and palpitation. Today I have switched him from losartan to diltiazem hoping to be able to control both hypertension and palpitation. He will check his blood pressure on a daily basis and send me the results electronically. He has had a CT of chest with contrast on 3- outside the network that has reported mild dilation of the ascending aorta (4.1 cm). He is scheduled to have a repeat study done next March. I have asked him to also let me know hoe diltiazem is working for his palpitation. If tolerated, it will be switched to once daily formulation of diltiazem.     On 11/26/2019: Mr Colton Pulido has not been tolerating the diltiazem and has stopped taking it. He particularly had noticed problems with erectile dysfunction. His home blood pressure readings show values as high as 159/96 mmHg.  Today in the clinic his blood pressure is 136/84 mmHg. He denies headaches but has noted that his palpitation has been quite worse after stopping the diltiazem. His cancer is under control and has not shown recurrence after surgery and his prostate problem is stable. He remains active and works part time. I have advised Mr Danny Magana to continue medical therapy for his systemic hypertension and particularly do not stop the medication on his own. He was started on doxazocin today at a very small dose hoping that it would also help with his prostatism symptoms. I have also ordered an extended ambulatory monitor for him to evaluate his frequent and disabling symptom of palpitation. He will be emailing his blood pressure readings and let me know how he feels on the new medication in about a week. He has had blood work done 2 months ago that I do not have access to and he will be sending those to me as well.     3-3-2020: Mr Jatin Wright has been managing his hypertension with diet and exercise and has not taken any of his antihypertensive medications. He continues to complain of insomnia and nocturia due to benign prostate enlargement but chooses not to take the prescribed doxazocin. He is scheduled to have chest and abdomen CT to evaluate his ascending aortic aneurysm (41 mm) and the seminoma post surgical right orchidectomy. He will also see his oncologist after imaging studies are done. He will also have blood work including lipid profile. Ambulatory ECG monitor performed in December showed few isolated atrial and ventricular premature beats that did not correlate with the sensation of skipped beats. 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 He also plays golf on Mondays and Wednesdays. He is following a heart healthy diet and has kept a normal body mass index.     3-: Mr Calle was seen last 3-3-2020.  He was then evaluated by his oncologist and had CT scans on 3- and 7- for follow up of his seminoma and right external iliac node. Blood work done on 7- showed normal normal WBC (5.6k), HGB (15.6), HCT (45.2), PLT (213K), CREATININE (0.94), BUN (18), electrolytes and liver enzymes. The summary of the CT scan performed on 7-: Stable right external iliac lymph node; no evidence of metastatic disease in the chest, abdomen or pelvis; no acute or suspicious process in the chest, abdomen or pelvis. On September 3-2020 he had a transthoracic echocardiogram that showed Normal LV size and systolic function with an ejection fraction of 42%, grade 1 diastolic dysfunction, borderline atrial sizes and no significant valvular abnormality. An ECG done today is normal except for left atrial abnormality. He is complaining of very uncomfortable feelings with skipped beats and palpitation on a daily basis. He has also noted that his blood pressure has been elevated at home (systolic values between 147 and 140 mmHg. He is however no taking the bisoprolol due to side effects. Today, his blood pressure is 140/88 mmHg in the office. I have asked him to do a 48 hour ambulatory ECG monitor and a 24-hour blood pressure monitor to objectively assess his palpitation and hypertension. He has not yet decided to have vaccination for COVID.     9-: Mr. Basilia Velez had  26 hour of ambulatory heart monitoring done as a part of evaluation for palpitation, which showed average heart rate of 85 bpm, 23 PVCs (0% of total beats ), 681 PACs (0.5% of total beats), patient's symptoms of chest pain coincided with sinus rhythm with only 1 episode of skipped beat sensation coinciding withPAC, 35 seconds of tachycardia which did not perceived symptomatic by patient. He continues having palpitations, mainly at night, around 2-3 a.m.  Although did not notice association with food intake might have some association with the alcohol intake and his working on dietary modifications.  We recommended to try over-the-counter H2-antagonist such as Famotidine as undiagnosed GERD could be possible etiology of his symptoms.  Will also discussed dietary changes the could potentially prevent exacerbation of GERD.  Mr. Calle had 24 hour ambulatory blood pressure monitoring (03/31/2021 to 04/01/2021), which showed daytime average blood pressure of 143/ 90 mmHg, nighttime average blood pressure of 126/76 mmHg, with average of 137/86 mmHg. He was started on losartan 50 mg daily which he decreased to 25 mg daily due to episodes of lightheadedness. Mr. Dona Andrews brought today results of the most recent blood work such as CMP, CBC, Urinalysis, lipid panel (LDL 89 mg/dL, total cholesterol 179 mg/dL, triglycerides 57 mg/dL, HDL 79 mg/dL), uric acid with all being within normal limits. The most recent PSA was slighlty elevated at 4.1 ng/mL (previously 2.2 ng/mL) and he will have PSA levels rechecked in few month. At this time he sees oncologist only as needed as the most recent survailance imaging did not show new changes. We will order lactate dehydrogenase, AFP and hCG levels to be checked with the plan to follow up with his oncologist if any abnormalities are noted. Will also check TSH levels given history of palpitations. He will be seen in [address] [place] [address] in 6 month.      5-: Mr. Dona Andrews was seen and examined with cardiology fellow, Dr. Chris Lira. He is asymptomatic, continues being active. He was seen by urologist in 4/2022 at [facility] to follow up on abnormal PSA level of 4.1 ng/ml from 8/2021, which subsequently improved to 2.4 ng/ml in 12/2022. He was recommended to have PSA rechecked in 12 month along with CT scan. Patient declined the latter one as was trying to limit his radiation exposure. He continues taking losartan 25 mg qd with blood pressure being at goal. Home SBP readings are around 110-120 mmHg. We encouraged him to continue staying active.  Will check TTE prior to the next visit to follow up on aortic valve insufficiency as well as ascending thoracic aortic dilatation (3.9 x 4.2 cm by CT in 7/2020). Mr. Michael Reynolds will be seen in [facility] in 6 month.      2-: Since his last office visit in May 2022 patient tells me that overall he feels well from a cardiac standpoint. He does continue to note palpitations as he did during the past several office visits. He states that these palpitations occur most frequently in the early morning hours between 3 and 5 AM.  He states he is unsure if these palpitations wake him up or if he just happens to be awake at that time and he is noticing them. He will occasionally notice some as well if he gets up to urinate in the middle of the night. Overall, the patient states he does not notice many palpitations during the day. He denies any associated chest discomfort, shortness of breath or lightheadedness when these palpitations occur. During our last office visit had been discussed that his increased palpitations in the early morning may be related to acid reflux/GERD and it was recommended that he trial over-the-counter H2 antagonist such as famotidine but the patient states he has not trialed this medication as he prefers to not take medicines. He does state that he agrees undiagnosed acid reflux/GERD may be underlying etiology to these palpitations as he notes that if he eats more offensive foods he will have increased palpitations the next day. He also states he has trialed Tums before bed on days that he knows he has eaten more offensive foods and he often notes the palpitations are lessened. I did again review the pharmacodynamics of H2 blockers and how this may help him but he again declines to take this on a daily basis. The patient does state that he has now completely eliminated all caffeine and alcohol his diet over the past several weeks and he notes this has helped to lessen his palpitations as well.   With regards to exercise the patient does resistance training with resistance bands roughly 30 minutes a day almost every day of the week with no complaints of chest discomfort or shortness of breath and this training does not precipitate his palpitations. His most recent 48-hour Holter monitor from March 2021 revealed PACs and PVCs but always correlating with his symptoms. I did review with the patient that if these symptoms become more prominent we can retrial beta-blockers. He denies any syncope or syncope also denies any lower extremity edema or orthopnea. Occasionally the patient will note some lightheadedness when he is exercising but he states that he has tracked this back to days where he was not as hydrated as he should be. He notes this lightheadedness does not occur every time he exercises and he typically does not feel lightheaded at any other time during the day. We did review that his blood pressure overall is controlled. Upon initial check on arrival to the office visit his blood pressure was 136/87 on my recheck at the end of office visit was 122/78. The patient states that he does check his blood pressure at home frequently and his systolic blood pressures typically in the mid 110's. He follows a low salt diet. Patient attempts to follow a low-fat/low-cholesterol diet as well. He states that he did have labs mid 2022 for his PCP and we will contact his PCPs office for these results. The patient states that he no longer following with urology telling me that his PCP is capable of following his PSA and he did not want any further radiologic screening. We did review his prior diagnosis of ascending aortic aneurysm last checked in July 2020 at [facility] CT of chest/abdomen/pelvis: is mild fusiform dilatation of the ascending thoracic aorta measuring 3.9 x 4.2 cm previously measuring 4.0 cm in maximal dimension. Did offer repeat CT scan as it has been several years since his last imaging and he declines updated imaging at this time. Since his last office visit on 5- the patient had updated echocardiogram performed with the following results: Left Ventricle: Left ventricular cavity size is normal. Wall thickness is mildly increased. There is mild asymmetric hypertrophy of the basal septal wall. The left ventricular ejection fraction is 62%. Systolic function is normal. Global longitudinal strain is reduced at -15%. Wall motion is normal. Diastolic function is mildly abnormal, consistent with grade I (abnormal) relaxation. There is no LV dynamic obstruction. No major valvular abnormalities noted. Aortic root normal in size.     Risk stratification:  Nonsustained ventricular tachycardia-no         Severe left ventricular hypertrophy-no  Family history of sudden death-no  Unexplained syncope-no  LVOT obstruction-no  Atrial fibrillation and left atrial dilation-no  Age-81years old  NYHA-class I-II  Myocardial fibrosis-no cardiac MRI completed  LV systolic dysfunction-no  Apical aneurysm-no     Review of systems: he notes chest pain and palpitations; no overt dyspnea; no LE edema and no orthopnea; he does note frequent lightheadedness.     Family history: Father  at age 43 suddenly for uncertain reason although he had drinking problem, mother  at age 80 (1) of stroke, she had dementia and bipolar disorder, a 74 year old sister has had diabetes and has been diagnosed with "hypertrophic cardiomyopathy" after being taken to a hospital in [place] with chest pain and shortness of breath while running, a brother committed suicide by shooting himself at age 61 (26). He also had problem with excess alcohol intake. Mr Jennifer Pascal has 2 children, a 36 year old son with diabetes (on insulin pump) has never been screened to the patient's knowledege and a 37 year old daughter without known health problem also has not been screened. He also has four grandchildren, 2 from each of his children. His son has a boy (25) and a girl (16) and his daughter has 4 children (ages 8, 5 and 2 and 1/2).  The grandchildren have had no heart problems and his daughter has gone through pregnancies without any issue.      Genetic testing:  Deferred given benign family history     Devices: none    Historical Information   Past Medical History:   Diagnosis Date   • Cancer ([address])    • Hypertension    • Prostatism    • 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[facility])      Past Surgical History:   Procedure Laterality Date   • COLONOSCOPY     • SKIN BIOPSY       No family history on file. Current Outpatient Medications on File Prior to Visit   Medication Sig Dispense Refill   • Ascorbic Acid 500 MG CAPS Take 1,000 mg by mouth 2 (two) times a day       • losartan (COZAAR) 25 mg tablet Take 1 tablet (25 mg total) by mouth in the morning. 90 tablet 3   • multivitamin (THERAGRAN) TABS Take 1 tablet by mouth daily        No current facility-administered medications on file prior to visit. No Known Allergies  Social History     Substance and Sexual Activity   Alcohol Use Not Currently    Comment: 1-4 weekly     Social History     Substance and Sexual Activity   Drug Use No     Social History     Tobacco Use   Smoking Status Never   Smokeless Tobacco Never     Objective      Vitals:   Visit Vitals  /78 (BP Location: Left arm, Patient Position: Sitting, Cuff Size: Standard)   Pulse 77   Temp (!) 97 °F (36.1 °C) (Tympanic)   Ht 5' 8" (1.727 m)   Wt 65.9 kg (145 lb 4.8 oz)   SpO2 97%   BMI 22.09 kg/m²   Smoking Status Never   BSA 1.78 m²   Body mass index is 22.09 kg/m².     Physical Exam:  GEN: Alan Stone appears well, alert and oriented x 3, pleasant and cooperative   HEENT: pupils equal, round, and reactive to light; extraocular muscles intact  NECK: supple, no carotid bruits   HEART: regular rhythm, normal S1 and S2, soft mid-systolic murmur best heard at the apex, + S4 gallop, no clicks or rubs   LUNGS: clear to auscultation bilaterally; no wheezes, rales, or rhonchi   ABDOMEN: normal bowel sounds, soft, no tenderness, no distention  EXTREMITIES: peripheral pulses normal; no clubbing, cyanosis, or edema  NEURO: no focal findings   SKIN: normal without suspicious lesions on exposed skin    Lab Results:   No results found for: "WBC", "RBC", "HGB", "HCT", "MCV", "PLT", "RDW"  No results found for: "NA", "K", "CL", "CO2", "ANIONGAP", "BUN", "CREATININE", "EGFR", "GLUCOSE", "CALCIUM", "AST", "ALT", "ALKPHOS", "PROT", "BILITOT"  No results found for: "MG"  No results found for: "CHOL", "HDL", "TRIG", "LDLCALC"  No results found for: "[id number]", "T3FREE", "Emma Flair", "Ryan Hidden", "[id number]"    Imaging:   I have personally reviewed pertinent films in PACS  No results found. Cardiac testing:     Echo 10-  Interpretation Summary       •  Left Ventricle: Left ventricular cavity size is normal. Wall thickness is mildly increased. There is mild asymmetric hypertrophy of the basal septal wall. The left ventricular ejection fraction is 62%. Systolic function is normal. Global longitudinal strain is reduced at -15%. Wall motion is normal. Diastolic function is mildly abnormal, consistent with grade I (abnormal) relaxation. There is no LV dynamic obstruction.     Strain was performed to quantify interventricular dyssynchrony and evaluate components of myocardial function due to (positive family history of HCM, family history of sudden death, HCM, Chemotherapy, complex CHD, genetic abnormality, viral infection) . Results from the utilization of Strain Analysis are listed in the report below.     Findings    Left Ventricle Left ventricular cavity size is normal. Wall thickness is mildly increased. There is mild asymmetric hypertrophy of the basal septal wall. The left ventricular ejection fraction is 62%. Systolic function is normal. Global longitudinal strain is reduced at -15%. Wall motion is normal. Diastolic function is mildly abnormal, consistent with grade I (abnormal) relaxation. There is no LV dynamic obstruction.       Right Ventricle Right ventricular cavity size is normal. Systolic function is normal. Wall thickness is normal.      Left Atrium The atrium is normal in size. Right Atrium The atrium is normal in size. Aortic Valve The aortic valve is trileaflet. The leaflets are not thickened. The leaflets are not calcified. The leaflets exhibit normal mobility. There is trace regurgitation. The aortic valve has no significant stenosis. Mitral Valve Mitral valve structure is normal. There is trace regurgitation. There is no evidence of stenosis. Tricuspid Valve Tricuspid valve structure is normal. There is trace regurgitation. There is no evidence of stenosis. The right ventricular systolic pressure is normal.      Pulmonic Valve Pulmonic valve structure is normal. There is no evidence of regurgitation. There is no evidence of stenosis. Ascending Aorta The aortic root is normal in size. IVC/SVC The inferior vena cava is normal in size. Pericardium There is no pericardial effusion. The pericardium is normal in appearance.         Left Ventricle Measurements    Function/Volumes   A4C EF 62 %         Dimensions   LVIDd 3.7 cm         LVIDS 2.8 cm         IVSd 1 cm         LVPWd 0.9 cm         FS 24 %         Diastolic Filling   MV E' Tissue Velocity Septal 9 cm/s         MV E' Tissue Velocity Lateral 12 cm/s         E wave deceleration time 185 ms         MV Peak E Clarke 37 cm/s         MV Peak A Clarke 0.69 m/s         Strain   GLS -15 %          Report Measurements   AV LVOT peak gradient 2 mmHg              Interventricular Septum Measurements    Shunt Ratio   LVOT peak VTI 12.32 cm         LVOT peak clarke 0.73 m/s              Right Ventricle Measurements    Dimensions   RVID d 4.3 cm               Left Atrium Measurements    Dimensions   LA size 2 cm         LA length (A2C) 3.8 cm               Right Atrium Measurements    Dimensions   RAA A4C 17.7 cm2               Atrial Septum Measurements    Shunt Ratio LVOT peak VTI 12.32 cm         LVOT peak clarke 0.73 m/s               Aortic Valve Measurements    Stenosis   LVOT peak clarke 0.73 m/s         LVOT peak VTI 12.32 cm         LVOT mn grad 1 mmHg         AV LVOT peak gradient 2 mmHg         Regurgitation   AV peak gradient 86 mmHg         AV Deceleration Time 1,463 ms         AV regurgitation pressure 1/2 time 424 ms               Mitral Valve Measurements    Stenosis   MV stenosis pressure 1/2 time 54 ms         MV valve area p 1/2 method 4.07 cm2               Tricuspid Valve Measurements    RVSP Parameters   TR Peak Clarke 2.2 m/s         Triscuspid Valve Regurgitation Peak Gradient 20 mmHg               Aorta Measurements    Aortic Dimensions   Ao root 3.4 cm               Exam Details    Performed Procedure Technologist Supporting Staff Performing Physician   Echo complete w/ strain CATALINA Roberts           Appointment Date/Status Modality Department    10/17/2022     Completed BE ECHO RM 1 BE CAR NON INV           Begin Exam End Exam  End Exam Questionnaires   10/17/2022  9:00 AM 10/17/2022 10:00 AM  PATIENT EDUCATION            All Reviewers List    Gerardo Mccarty DO on 11/1/2022  9:05 AM   Lilibeth Knowles MD on 10/18/2022  8:20 AM     Signed    Electronically signed by Lilibeth Knowles MD on 10/18/22 at 2026 [facility] EDT     Counseling / Coordination of Care  Total floor / unit time spent today 40 minutes. Greater than 50% of total time was spent with the patient and / or family counseling and / or coordination of care.

## 2023-08-08 ENCOUNTER — OFFICE VISIT (OUTPATIENT)
Dept: CARDIAC SURGERY | Facility: CLINIC | Age: 73
End: 2023-08-08
Payer: COMMERCIAL

## 2023-08-08 VITALS
HEART RATE: 77 BPM | SYSTOLIC BLOOD PRESSURE: 127 MMHG | BODY MASS INDEX: 22.02 KG/M2 | DIASTOLIC BLOOD PRESSURE: 78 MMHG | HEIGHT: 68 IN | WEIGHT: 145.3 LBS | OXYGEN SATURATION: 97 % | TEMPERATURE: 97 F

## 2023-08-08 DIAGNOSIS — I42.1 HOCM (HYPERTROPHIC OBSTRUCTIVE CARDIOMYOPATHY) (HCC): Primary | ICD-10-CM

## 2023-08-08 DIAGNOSIS — R00.2 PALPITATIONS: ICD-10-CM

## 2023-08-08 DIAGNOSIS — I10 PRIMARY HYPERTENSION: ICD-10-CM

## 2023-08-08 DIAGNOSIS — R07.9 CHEST PAIN, UNSPECIFIED TYPE: ICD-10-CM

## 2023-08-08 PROCEDURE — 99214 OFFICE O/P EST MOD 30 MIN: CPT | Performed by: INTERNAL MEDICINE

## 2023-08-14 ENCOUNTER — CLINICAL SUPPORT (OUTPATIENT)
Dept: CARDIOLOGY CLINIC | Facility: CLINIC | Age: 73
End: 2023-08-14
Payer: COMMERCIAL

## 2023-08-14 DIAGNOSIS — R00.2 PALPITATIONS: ICD-10-CM

## 2023-08-14 PROCEDURE — 93246 EXT ECG>7D<15D RECORDING: CPT | Performed by: INTERNAL MEDICINE

## 2023-09-14 ENCOUNTER — HOSPITAL ENCOUNTER (OUTPATIENT)
Dept: NON INVASIVE DIAGNOSTICS | Facility: HOSPITAL | Age: 73
Discharge: HOME | End: 2023-09-14
Payer: COMMERCIAL

## 2023-09-14 VITALS
HEART RATE: 76 BPM | HEIGHT: 68 IN | OXYGEN SATURATION: 98 % | WEIGHT: 145 LBS | SYSTOLIC BLOOD PRESSURE: 120 MMHG | BODY MASS INDEX: 21.98 KG/M2 | DIASTOLIC BLOOD PRESSURE: 80 MMHG

## 2023-09-14 DIAGNOSIS — R00.2 PALPITATIONS: ICD-10-CM

## 2023-09-14 DIAGNOSIS — I42.1 HOCM (HYPERTROPHIC OBSTRUCTIVE CARDIOMYOPATHY) (HCC): ICD-10-CM

## 2023-09-14 DIAGNOSIS — R07.9 CHEST PAIN, UNSPECIFIED TYPE: ICD-10-CM

## 2023-09-14 LAB
ARRHY DURING EX: NORMAL
AV REGURGITATION PRESSURE HALF TIME: 577 MS
GLOBAL LONGITUIDAL STRAIN: -17 %
MAX DIASTOLIC BP: 80 MMHG
MAX HEART RATE: 139 BPM
MAX HR PERCENT: 93 %
MAX HR: 139 BPM
MAX PREDICTED HEART RATE: 150 BPM
MAX. SYSTOLIC BP: 188 MMHG
PROTOCOL NAME: NORMAL
RATE PRESSURE PRODUCT: NORMAL
REASON FOR TERMINATION: NORMAL
SL CV AV DECELERATION TIME RETROGRADE: 1990 MS
SL CV AV PEAK GRADIENT RETROGRADE: 79 MMHG
SL CV LV EF: 65
SL CV STRESS RECOVERY BP: NORMAL MMHG
SL CV STRESS RECOVERY HR: 100 BPM
SL CV STRESS RECOVERY O2 SAT: 98 %
SL CV STRESS STAGE REACHED: 6
STRESS ANGINA INDEX: 0
STRESS BASELINE BP: NORMAL MMHG
STRESS BASELINE HR: 76 BPM
STRESS O2 SAT REST: 98 %
STRESS PEAK HR: 126 BPM
STRESS POST ESTIMATED WORKLOAD: 8.3 METS
STRESS POST EXERCISE DUR MIN: 10 MIN
STRESS POST EXERCISE DUR SEC: 30 SEC
STRESS POST O2 SAT PEAK: 98 %
STRESS POST PEAK BP: 188 MMHG
TARGET HR FORMULA: NORMAL
TEST INDICATION: NORMAL
TIME IN EXERCISE PHASE: NORMAL
TR MAX PG: 17 MMHG
TR PEAK VELOCITY: 2.1 M/S
TRICUSPID VALVE PEAK REGURGITATION VELOCITY: 2.09 M/S

## 2023-09-14 PROCEDURE — 93350 STRESS TTE ONLY: CPT | Performed by: INTERNAL MEDICINE

## 2023-09-14 PROCEDURE — 93350 STRESS TTE ONLY: CPT

## 2024-02-05 NOTE — PROGRESS NOTES
[facility] Follow-up Visit - Cardiology   Bubba Calle 73 y.o. 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 The results of a 2 week ambulatory heart monitor was received on 8- and showed:    Patient had a min HR of 55 bpm, max HR of 135 bpm, and avg HR of 83 bpm. Predominant underlying rhythm was Sinus Rhythm. Slight P wave morphology changes were noted. 1 run of Supraventricular Tachycardia occurred lasting 13 beats with a max rate of 135 bpm (avg 106 bpm). Some episodes of Supraventricular Tachycardia may be possible Atrial Tachycardia with variable block. Isolated SVEs were occasional (1.7%, 88319), SVE Couplets were rare (<1.0%, 33), and no SVE Triplets were present. Isolated VEs were rare (<1.0%, 480), VE Triplets were rare (<1.0%, 1), and no VE Couplets were present.    He also underwent stress echocardiography for continued symptoms on 9- that revealed:    A bicycle protocol stress test was performed. The patient reached stage 6.0 of the protocol after exercising for 10 min and 30 sec and had a maximal HR of 139 bpm (93 % of MPHR) and 8.3 METS. The patient experienced no angina during the test. The patient achieved the target heart rate. The patient reported mild fatigue during the stress test. Symptoms began during stress and ended during recovery. Blood pressure demonstrated a normal response and heart rate demonstrated a normal response to stress.       Left Ventricle: Left ventricular cavity size is normal.  "Wall thickness is mildly increased. There is mild asymmetric hypertrophy of the basal septal wall. The left ventricular ejection fraction is 65%. Systolic function is normal. Global longitudinal strain is normal at -17%. Wall motion is normal. Diastolic function is mildly abnormal, consistent with grade I (abnormal) relaxation.    Aortic Valve: There is mild regurgitation.    Stress ECG: No ST deviation is noted. There were no arrhythmias during recovery. . The ECG was negative for ischemia. The stress ECG is negative for ischemia after maximal exercise, without reproduction of symptoms.    Post Stress Echo: Left ventricle cavity has normal reduction in size post-stress. The left ventricle systolic function is hyperdynamic post-stress.    Echo Post Impression: This study shows a low prognostic risk. The study is normal.    Today, he continues to feel palpitations that he describes as a \"skipping\" occurring daily, mostly in the evening/night. We did review his extended monitor which overall was benign and most of his triggered events occurred with a normal rhythm. He states he feels his wrist at the time and notes \"my heart is skipping, I can feel it when I take my pulse\". His EKG today shows NSR with no ectopy.         He does perform regular exercise several days a week doing cardiovascular exercise as well as resistance bands and stretching. He can perform these activities with no cardiac complaints and exercise does not precipitate palpitations. When he feels his heart \"skipping\" he will have some chest tightness as well as shortness of breath and lightheadedness.  We did discuss that his GERD may be causing an issue as well and may be contributing to his symptoms. We have recommended that he trial pantoprazole 40 mg once daily for a few weeks to see if this will help his symptoms. His blood pressure is acceptable today on losartan 25 mg daily. A low sodium diet was reinforced.     Physician Requesting Consult: " Primary Care  Reason for Consult / Principal Problem: Suspected hypertrophic cardiomyopathy     HPI: Bubba Calle is a 72 y.o. year old retired  who presented with complaints of palpitation and chest discomfort. The symptoms began in 2014 and triggered work up that included pharmacologic myocardial perfusion imaging (2014) and echocardiogram (2014) that were reportedly negative and a Holter monitor (2014) that showed premature atrial and ventricular premature beats that did not correlate with the timing of his symptoms (2014). He continued to be symptomatic and was again evaluated in 2016 and 2018. Most recently, he had a 2-week ambulatory ECG monitor that also reportedly showed no high grade arrhythmias or significant pauses. He is healthy, physically fit with a BMI of 22.5 kg/m2, plays golf for 2 days and works part time for 3 days a week. His past medical history is significant for testicular cancer (seminoma) s/p right orchidectomy and recent diagnosis of prostate enlargement on a CT scan taken in follow up evaluation of his cancer. He does have symptoms of prostatism and in particular wakes up more than once at night to urinate. Mr Calle is quite concerned about his symptoms of palpitation, chest discomfort and occasional dizziness particularly after the hospitalization of her sister. He completed an echocardiogram on 10- that showed normal LV size and systolic function with an ejection fraction of 60% and no regional wall motion abnormalities; no evidence of LVH, no significant valvular abnormality and borderline increase in the size of the aortic root (3.7 cm). He also had a treadmill exercise performed that showed excellent exercise tolerance (duration of exercise was 13 min and maximal work rate was 15.3 METs), reached a peak heart rate of 157 bpm (101 % of maximal predicted heart rate). He was, however, hypertensive at baseline (162/80) with appropriate blood pressure response to  stress. There was no chest pain during stress, the stress ECG was normal and no significant arrhythmias were noted (isolated premature ventricular beats). His blood pressure was high during echocardiography as well. He does not add salt to his diet although he does eat out often, has maintained an ideal body weight and is quite active. He does have strong family history of hypertension. His lipid profile is being checked by his family physician. He was started on bisoprolol to treat high blood pressure and help with the sensation of skipped heart beats that was his main concern at that point.      On 1/29/2019: Mr Calle has done extremely well since last office visit. He has brought with him home blood pressure readings that range from 115 to 130 mmHg systolic and 70 to 80 mmHg diastolic. He denies chest pain, shortness of breath and dizziness and feels his palpitation has improved significantly. He exercises regularly and remains active, follows a heart healthy diet low in sodium and is compliant with his medications. I reviewed the medical records of his sister, Lyubov Watts, who has been evaluated at the [facility] in [place] and appears to me that she has a similar problem likely caused by systemic hypertension. In her evaluation, much emphasis has been given to the reportedly sudden death of their father at age 42 and to Mr Calle's diagnosis of HCM. However, the circumstances of father's death is unclear and the contribution of excess alcohol intake may have been significant. Mr Calle's mild concentric left ventricular hypertrophy is also well explained by his systemic hypertension. Genetic testing at this point does not seem to be helpful considering the otherwise benign family history and mild phenotypic expression. His risk stratification is complete and he would not require further testing at this point.     On 5/21/2019: Mr Calle has not tolerated the bisoprolol that was  started for both hypertension and palpitation related to premature ventricular contractions. The medication was effective for both but resulted in insomnia, anxiety, chest pain, cold feet and hands, joint pain and most importantly problems getting and maintaining an erection. Even cutting the dose from 5 to 2.5 mg per day did not resolve the side effects and he stopped the medication altogether and noted rebound hypertension (140/90) and increased heart rate (resting heart rate of 90-95 bpm). He was then started on losartan that has improved blood pressure control but has not done much for the rapid heart rate and palpitation. Today I have switched him from losartan to diltiazem hoping to be able to control both hypertension and palpitation. He will check his blood pressure on a daily basis and send me the results electronically. He has had a CT of chest with contrast on 3- outside the network that has reported mild dilation of the ascending aorta (4.1 cm). He is scheduled to have a repeat study done next March. I have asked him to also let me know hoe diltiazem is working for his palpitation. If tolerated, it will be switched to once daily formulation of diltiazem.     On 11/26/2019: Mr Burrows has not been tolerating the diltiazem and has stopped taking it. He particularly had noticed problems with erectile dysfunction. His home blood pressure readings show values as high as 159/96 mmHg. Today in the clinic his blood pressure is 136/84 mmHg. He denies headaches but has noted that his palpitation has been quite worse after stopping the diltiazem. His cancer is under control and has not shown recurrence after surgery and his prostate problem is stable. He remains active and works part time. I have advised Mr Calle to continue medical therapy for his systemic hypertension and particularly do not stop the medication on his own. He was started on doxazocin today at a very small dose hoping that it would also  help with his prostatism symptoms. I have also ordered an extended ambulatory monitor for him to evaluate his frequent and disabling symptom of palpitation. He will be emailing his blood pressure readings and let me know how he feels on the new medication in about a week. He has had blood work done 2 months ago that I do not have access to and he will be sending those to me as well.     3-3-2020: Mr Burrows has been managing his hypertension with diet and exercise and has not taken any of his antihypertensive medications. He continues to complain of insomnia and nocturia due to benign prostate enlargement but chooses not to take the prescribed doxazocin. He is scheduled to have chest and abdomen CT to evaluate his ascending aortic aneurysm (41 mm) and the seminoma post surgical right orchidectomy. He will also see his oncologist after imaging studies are done. He will also have blood work including lipid profile. Ambulatory ECG monitor performed in December showed few isolated atrial and ventricular premature beats that did not correlate with the sensation of skipped beats. 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 He also plays golf on Mondays and Wednesdays. He is following a heart healthy diet and has kept a normal body mass index.     3-: Mr Calle was seen last 3-3-2020. He was then evaluated by his oncologist and had CT scans on 3- and 7- for follow up of his seminoma and right external iliac node. Blood work done on 7- showed normal normal WBC (5.6k), HGB (15.6), HCT (45.2), PLT (213K), CREATININE (0.94), BUN (18), electrolytes and liver enzymes. The summary of the CT scan performed on 7-: Stable right external iliac lymph node; no evidence of metastatic disease in the chest, abdomen or pelvis; no acute or suspicious process in the chest, abdomen or pelvis. On September 3-2020 he had a transthoracic echocardiogram that  showed Normal LV size and systolic function with an ejection fraction of 60%, grade 1 diastolic dysfunction, borderline atrial sizes and no significant valvular abnormality. An ECG done today is normal except for left atrial abnormality. He is complaining of very uncomfortable feelings with skipped beats and palpitation on a daily basis. He has also noted that his blood pressure has been elevated at home (systolic values between 130 and 140 mmHg. He is however no taking the bisoprolol due to side effects. Today, his blood pressure is 140/88 mmHg in the office. I have asked him to do a 48 hour ambulatory ECG monitor and a 24-hour blood pressure monitor to objectively assess his palpitation and hypertension. He has not yet decided to have vaccination for COVID.     9-: Mr. Calle had  26 hour of ambulatory heart monitoring done as a part of evaluation for palpitation, which showed average heart rate of 85 bpm, 23 PVCs (0% of total beats ), 681 PACs (0.5% of total beats), patient's symptoms of chest pain coincided with sinus rhythm with only 1 episode of skipped beat sensation coinciding withPAC, 35 seconds of tachycardia which did not perceived symptomatic by patient. He continues having palpitations, mainly at night, around 2-3 a.m. Although did not notice association with food intake might have some association with the alcohol intake and his working on dietary modifications.  We recommended to try over-the-counter H2-antagonist such as Famotidine as undiagnosed GERD could be possible etiology of his symptoms.  Will also discussed dietary changes the could potentially prevent exacerbation of GERD.  Mr. Calle had 24 hour ambulatory blood pressure monitoring (03/31/2021 to 04/01/2021), which showed daytime average blood pressure of 143/ 90 mmHg, nighttime average blood pressure of 126/76 mmHg, with average of 137/86 mmHg. He was started on losartan 50 mg daily which he decreased to 25 mg daily due to episodes  of lightheadedness. Mr. Calle brought today results of the most recent blood work such as CMP, CBC, Urinalysis, lipid panel (LDL 89 mg/dL, total cholesterol 179 mg/dL, triglycerides 57 mg/dL, HDL 79 mg/dL), uric acid with all being within normal limits. The most recent PSA was slighlty elevated at 4.1 ng/mL (previously 2.2 ng/mL) and he will have PSA levels rechecked in few month. At this time he sees oncologist only as needed as the most recent survailance imaging did not show new changes. We will order lactate dehydrogenase, AFP and hCG levels to be checked with the plan to follow up with his oncologist if any abnormalities are noted. Will also check TSH levels given history of palpitations. He will be seen in [facility] clinic in 6 month.      5-: Mr. Calle was seen and examined with cardiology fellow, Dr. Lindsey. He is asymptomatic, continues being active. He was seen by urologist in 4/2022 at [facility] to follow up on abnormal PSA level of 4.1 ng/ml from 8/2021, which subsequently improved to 2.4 ng/ml in 12/2022. He was recommended to have PSA rechecked in 12 month along with CT scan. Patient declined the latter one as was trying to limit his radiation exposure. He continues taking losartan 25 mg qd with blood pressure being at goal. Home SBP readings are around 110-120 mmHg. We encouraged him to continue staying active. Will check TTE prior to the next visit to follow up on aortic valve insufficiency as well as ascending thoracic aortic dilatation (3.9 x 4.2 cm by CT in 7/2020). Mr. Calle will be seen in HCM clinic in 6 month.      2-: Since his last office visit in May 2022 patient tells me that overall he feels well from a cardiac standpoint.  He does continue to note palpitations as he did during the past several office visits.  He states that these palpitations occur most frequently in the early morning hours between 3 and 5 AM.  He states he is unsure if these palpitations wake him up or if  he just happens to be awake at that time and he is noticing them.  He will occasionally notice some as well if he gets up to urinate in the middle of the night.  Overall, the patient states he does not notice many palpitations during the day.  He denies any associated chest discomfort, shortness of breath or lightheadedness when these palpitations occur.  During our last office visit had been discussed that his increased palpitations in the early morning may be related to acid reflux/GERD and it was recommended that he trial over-the-counter H2 antagonist such as famotidine but the patient states he has not trialed this medication as he prefers to not take medicines.  He does state that he agrees undiagnosed acid reflux/GERD may be underlying etiology to these palpitations as he notes that if he eats more offensive foods he will have increased palpitations the next day.  He also states he has trialed Tums before bed on days that he knows he has eaten more offensive foods and he often notes the palpitations are lessened.  I did again review the pharmacodynamics of H2 blockers and how this may help him but he again declines to take this on a daily basis.  The patient does state that he has now completely eliminated all caffeine and alcohol his diet over the past several weeks and he notes this has helped to lessen his palpitations as well.  With regards to exercise the patient does resistance training with resistance bands roughly 30 minutes a day almost every day of the week with no complaints of chest discomfort or shortness of breath and this training does not precipitate his palpitations.  His most recent 48-hour Holter monitor from March 2021 revealed PACs and PVCs but always correlating with his symptoms.  I did review with the patient that if these symptoms become more prominent we can retrial beta-blockers.  He denies any syncope or syncope also denies any lower extremity edema or orthopnea.  Occasionally the  patient will note some lightheadedness when he is exercising but he states that he has tracked this back to days where he was not as hydrated as he should be.  He notes this lightheadedness does not occur every time he exercises and he typically does not feel lightheaded at any other time during the day.  We did review that his blood pressure overall is controlled.  Upon initial check on arrival to the office visit his blood pressure was 136/87 on my recheck at the end of office visit was 122/78.  The patient states that he does check his blood pressure at home frequently and his systolic blood pressures typically in the mid 110's. He follows a low salt diet.  Patient attempts to follow a low-fat/low-cholesterol diet as well.  He states that he did have labs mid 2022 for his PCP and we will contact his PCPs office for these results.  The patient states that he no longer following with urology telling me that his PCP is capable of following his PSA and he did not want any further radiologic screening.  We did review his prior diagnosis of ascending aortic aneurysm last checked in July 2020 at [facility] CT of chest/abdomen/pelvis: is mild fusiform dilatation of the ascending thoracic aorta measuring 3.9 x 4.2 cm previously measuring 4.0 cm in maximal dimension.  Did offer repeat CT scan as it has been several years since his last imaging and he declines updated imaging at this time.Since his last office visit on 5- the patient had updated echocardiogram performed with the following results: Left Ventricle: Left ventricular cavity size is normal. Wall thickness is mildly increased. There is mild asymmetric hypertrophy of the basal septal wall. The left ventricular ejection fraction is 62%. Systolic function is normal. Global longitudinal strain is reduced at -15%. Wall motion is normal. Diastolic function is mildly abnormal, consistent with grade I (abnormal) relaxation. There is no LV dynamic obstruction. No major  "valvular abnormalities noted. Aortic root normal in size.    8-8-2023: Since his last office visit on 2- he was seen by Dermatology for a lesion on central frontal scalp. This was biopsied and determined to be precancerous and was excised. He was also seen by GI for changes in bowel habits and celiac panel as well as inflammatory markers were negative. His bowel changes improved with addition of Metamucil. He does note some acid reflux type symptoms mostly at night that he feels is associated with specific foods. As it had been discussed with him in the past, we did again reiterate that his nightly symptoms of chest pain/palpitations may indeed be related to GERD and we have again encouraged him to reduce consumption of potentially provocative foods and consider use of an H2 blocker vs. PPI. Since his last visit, he says he has been having several cardiac complaints. He continues to note palpitations describing the feeling as \"skipping beats\". He notes this is becoming more frequent and happening daily but he notes it mostly intensely at night. He did just complete wearing a two week ZIO monitor that he mailed out over this weekend, we will have results in a few days. His TSH was WNL in June of this year. He also notes he is feeling more frequent lightheadedness as well. He claims he is well hydrated through the day. He is having chest discomfort as well but this is not brought on by exertion as he does exercise almost daily and exercising does not produce symptoms. His blood pressure readings have been acceptable at home on losartan 25 mg daily. He has not been following up with Urology or Oncology anymore as he does not feel follow ups are necessary. We have encouraged him to continue exercise as well as following a heart healthy, low fat/cholesterol/sodium diet. For his symptoms, we will await the results recent ZIO monitor and we will also order exercise bike stress echo to evaluate intracavitary or LV " "outflow tract obstruction as the cause of his symptoms.       Risk stratification:  Nonsustained ventricular tachycardia-no         Severe left ventricular hypertrophy-no  Family history of sudden death-no  Unexplained syncope-no  LVOT obstruction-no  Atrial fibrillation and left atrial dilation-no  Age-72 years old  NYHA-class I-II  Myocardial fibrosis-no cardiac MRI completed  LV systolic dysfunction-no  Apical aneurysm-no     Review of systems: denies chest pain and dyspnea with exertion; continues to note palpitations mostly in the evening/night (if his palpitations last for awhile then he can have chest pain, shortness of breath and lightheadedness); no LE edema/orthopnea; no dizziness/lightheadedness on a regular basis     Family history: Father  at age 42 suddenly for uncertain reason although he had drinking problem, mother  at age 84 () of stroke, she had dementia and bipolar disorder, a 70 year old sister has had diabetes and has been diagnosed with \"hypertrophic cardiomyopathy\" after being taken to a hospital in [place] with chest pain and shortness of breath while running, a brother committed suicide by shooting himself at age 59 (). He also had problem with excess alcohol intake. Mr Calle has 2 children, a 45 year old son with diabetes (on insulin pump) has never been screened to the patient's knowledege and a 40 year old daughter without known health problem also has not been screened. He also has four grandchildren, 2 from each of his children. His son has a boy (18) and a girl (13) and his daughter has 4 children (ages 8, 5 and 2 and 1/2). The grandchildren have had no heart problems and his daughter has gone through pregnancies without any issue.      Genetic testing:  Deferred given benign family history     Devices: none     Historical Information   Past Medical History:   Diagnosis Date    Cancer (HCC)     Hypertension     Prostatism     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" "(HCC)      Past Surgical History:   Procedure Laterality Date    COLONOSCOPY      SKIN BIOPSY       No family history on file.  Current Outpatient Medications on File Prior to Visit   Medication Sig Dispense Refill    Ascorbic Acid 500 MG CAPS Take 1,000 mg by mouth 2 (two) times a day        losartan (COZAAR) 25 mg tablet Take 1 tablet (25 mg total) by mouth in the morning. 90 tablet 3    multivitamin (THERAGRAN) TABS Take 1 tablet by mouth daily        No current facility-administered medications on file prior to visit.     No Known Allergies  Social History     Substance and Sexual Activity   Alcohol Use Not Currently    Comment: 1-4 weekly     Social History     Substance and Sexual Activity   Drug Use No     Social History     Tobacco Use   Smoking Status Never   Smokeless Tobacco Never     Objective   Vitals: Visit Vitals  /78 (BP Location: Right arm, Patient Position: Sitting, Cuff Size: Standard)   Pulse 74   Ht 5' 8\" (1.727 m)   Wt 67.4 kg (148 lb 11.2 oz)   SpO2 97%   BMI 22.61 kg/m²   Smoking Status Never   BSA 1.8 m²        Invasive Devices       None                 Physical Exam:  GEN: Bubba Calle appears well, alert and oriented x 3, pleasant and cooperative   HEENT: pupils equal, round, and reactive to light; extraocular muscles intact  NECK: supple, no carotid bruits   HEART: regular rhythm, normal S1 and S2, no murmurs, clicks, gallops or rubs   LUNGS: clear to auscultation bilaterally; no wheezes, rales, or rhonchi   ABDOMEN: normal bowel sounds, soft, no tenderness, no distention  EXTREMITIES: peripheral pulses normal; no clubbing, cyanosis, or edema  NEURO: no focal findings   SKIN: normal without suspicious lesions on exposed skin    Lab Results:   No results found for: \"WBC\", \"RBC\", \"HGB\", \"HCT\", \"MCV\", \"PLT\", \"RDW\"  Lab Results   Component Value Date    K 4.2 07/20/2020     07/20/2020    CO2 29 07/20/2020    BUN 18 07/20/2020    CREATININE 0.94 07/20/2020    EGFR 84 07/20/2020 " "   CALCIUM 9.0 2020    AST 16 2020    ALT 30 2020    ALKPHOS 56 2020     Lab Results   Component Value Date    MG 2.2 10/24/2018     No results found for: \"CHOL\", \"HDL\", \"TRIG\", \"LDLCALC\"  No results found for: \"LDE9CGMQAWVX\", \"T3FREE\", \"FREET4\", \"J6VYRBU\", \"F4KTGHR\"    Imaging:   I have personally reviewed pertinent films in PACS    Cardiac testing:   Results for orders placed during the hospital encounter of 20    Echo complete with contrast if indicated    [place] [address]    Transthoracic Echocardiogram  2D, M-mode, Doppler, and Color Doppler    Study date:  03-Sep-2020    Patient: ANTONIO LLANOS  MR number: [id number]  Account number: [id number]  : 1950  Age: 69 years  Gender: Male  Status: Outpatient  Location: [facility]  Height: 68 in  Weight: 141.7 lb  BP: 119/ 76 mmHg    Indications: Essential Hypertension    Diagnoses: I10. - Essential (primary) hypertension    Sonographer:  Alli Melendez RDCS  Primary Physician:  Gabriel Wilks MD  Referring Physician:  Christopher Lambert MD  Group:  [facility]  Interpreting Physician:  Vanesa Fair DO    SUMMARY    LEFT VENTRICLE:  Systolic function was normal. Ejection fraction was estimated to be 60 %.  There were no regional wall motion abnormalities.  Doppler parameters were consistent with abnormal left ventricular relaxation (grade 1 diastolic dysfunction).    RIGHT ATRIUM:  The right atrial size was at the upper limits of normal to mildly dilated.    AORTIC VALVE:  There was trace regurgitation.    TRICUSPID VALVE:  There was trace regurgitation.    COMPARISONS:  The previous study was not available for direct comparison.    HISTORY: PRIOR HISTORY: Hypertension, Hypertrophic Obstructive Cardiomyopathy, Palpitations    PROCEDURE: The study was performed in the [facility]. This was " a routine study. The transthoracic approach was used. The study included complete 2D imaging, M-mode, complete spectral Doppler, and color Doppler.  The heart rate was 76 bpm, at the start of the study. Images were obtained from the parasternal, apical, subcostal, and suprasternal notch acoustic windows. Image quality was adequate.    LEFT VENTRICLE: Size was normal. Systolic function was normal. Ejection fraction was estimated to be 60 %. There were no regional wall motion abnormalities. Wall thickness was normal. DOPPLER: Doppler parameters were consistent with  abnormal left ventricular relaxation (grade 1 diastolic dysfunction).    RIGHT VENTRICLE: The size was normal. Systolic function was normal. Wall thickness was normal.    LEFT ATRIUM: Size was normal.    RIGHT ATRIUM: The right atrial size was at the upper limits of normal to mildly dilated.    MITRAL VALVE: Valve structure was normal. There was normal leaflet separation. DOPPLER: The transmitral velocity was within the normal range. There was no evidence for stenosis. There was no regurgitation.    AORTIC VALVE: The valve was trileaflet. Leaflets exhibited normal thickness and normal cuspal separation. DOPPLER: Transaortic velocity was within the normal range. There was no evidence for stenosis. There was trace regurgitation.    TRICUSPID VALVE: The valve structure was normal. There was normal leaflet separation. DOPPLER: The transtricuspid velocity was within the normal range. There was no evidence for stenosis. There was trace regurgitation.    PULMONIC VALVE: Not well visualized. DOPPLER: The transpulmonic velocity was within the normal range. There was no significant regurgitation.    PERICARDIUM: There was no pericardial effusion. The pericardium was normal in appearance.    SYSTEMIC VEINS: IVC: The inferior vena cava was normal in size and course. Respirophasic changes were normal.    SYSTEM MEASUREMENT TABLES    2D  %FS: 34.06 %  Ao Diam: 2.63  "cm  EDV(Teich): 61.93 ml  EF(Teich): 63.77 %  ESV(Cube): 15.72 ml  ESV(Teich): 22.44 ml  IVC: 18.21 mm  IVSd: 0.91 cm  LA Area: 11.61 cm2  LA Diam: 2.56 cm  LVEDV MOD A4C: 65.34 ml  LVEF MOD A4C: 61.53 %  LVESV MOD A4C: 25.14 ml  LVIDd: 3.8 cm  LVIDs: 2.51 cm  LVLd A4C: 8.69 cm  LVLs A4C: 7.44 cm  LVOT Diam: 1.96 cm  LVPWd: 1.1 cm  RA Area: 18.48 cm2  RV Diam.: 4.09 cm  SI(Cube): 22.11 ml/m2  SI(Teich): 22.31 ml/m2  SV MOD A4C: 40.2 ml  SV(Cube): 39.13 ml  SV(Teich): 39.49 ml    CW  TR Vmax: 2.04 m/s  TR maxP.63 mmHg    MM  TAPSE: 1.99 cm    PW  E': 0.08 m/s  E/E': 7.46  MV A Clarke: 0.64 m/s  MV Dec Forrest: 2.58 m/s2  MV DecT: 221.88 ms  MV E Clarke: 0.57 m/s  MV E/A Ratio: 0.89    [facility]    Prepared and electronically signed by    Vanesa Fair DO  Signed 03-Sep-2020 17:07:14    Name: Bubba Calle                       : 1950  MRN: [id number]                       Age: 73 y.o.  Patient Status: Outpatient          Gender: male  Echo stress test, exercise    Height: 5' 8\" (1.727 m)   Weight: 65.8 kg (145 lb)   BSA: 1.78 m²   Blood Pressure: 120/80    Date of Study: 23   Ordering Provider: RYAN Woodward   Clinical Indications: Other, Please Specify in Comments       Interpreting Physicians  Performing Staff   Christopher Lambert MD Tech: Gabo Carmichael RS   Support Staff: Shelby Gilliam RN        Height & Weight    Height Weight BSA (Calculated - m2)   5' 8\" (1.727 m) 65.8 kg (145 lb) 1.78 sq meters     PACS Images     Show images for Echo stress test, exercise  Study Details    Study quality was adequate. The apical and parasternal views were obtained.     History    HCM, lightheaded, palps, PVCs, CP, HTN, family history     Interpretation Summary         Left Ventricle: Left ventricular cavity size is normal. Wall thickness is mildly increased. There is mild asymmetric hypertrophy of the basal septal wall. The left ventricular ejection " fraction is 65%. Systolic function is normal. Global longitudinal strain is normal at -17%. Wall motion is normal. Diastolic function is mildly abnormal, consistent with grade I (abnormal) relaxation.    Aortic Valve: There is mild regurgitation.    Stress ECG: No ST deviation is noted. There were no arrhythmias during recovery. . The ECG was negative for ischemia. The stress ECG is negative for ischemia after maximal exercise, without reproduction of symptoms.    Post Stress Echo: Left ventricle cavity has normal reduction in size post-stress. The left ventricle systolic function is hyperdynamic post-stress.    Echo Post Impression: This study shows a low prognostic risk. The study is normal.     Strain was performed to quantify interventricular dyssynchrony and evaluate components of myocardial function due to HCM. Results from the utilization of Strain Analysis are listed in the report below.     Stress Findings    Stress Findings A bicycle protocol stress test was performed. The patient reached stage 6.0 of the protocol after exercising for 10 min and 30 sec and had a maximal HR of 139 bpm (93 % of MPHR) and 8.3 METS. The patient experienced no angina during the test. The patient achieved the target heart rate. The patient reported mild fatigue during the stress test. Symptoms began during stress and ended during recovery. Blood pressure demonstrated a normal response and heart rate demonstrated a normal response to stress.     Findings    Left Ventricle Left ventricular cavity size is normal. Wall thickness is mildly increased. There is mild asymmetric hypertrophy of the basal septal wall. The left ventricular ejection fraction is 65%. Systolic function is normal. Global longitudinal strain is normal at -17%.  Wall motion is normal. Diastolic function is mildly abnormal, consistent with grade I (abnormal) relaxation.   Right Ventricle Right ventricular cavity size is normal. Systolic function is normal. Wall  thickness is normal.   Left Atrium The atrium is normal in size.   Right Atrium The atrium is normal in size.   Aortic Valve The aortic valve is trileaflet. The leaflets are mildly thickened. The leaflets are not calcified. The leaflets exhibit normal mobility. There is mild regurgitation. The aortic valve has no significant stenosis.   Mitral Valve Mitral valve structure is normal. There is trace regurgitation. There is no evidence of stenosis.   Tricuspid Valve Tricuspid valve structure is normal. There is trace regurgitation. There is no evidence of stenosis. The right ventricular systolic pressure is normal.     Stress Echo Findings    Study Impression This study shows a low prognostic risk. The study is normal.     Stress Measurements    Baseline Vitals   Baseline HR 76 bpm         Baseline /80 mmHg         O2 sat rest 98 %         Peak Stress Vitals   Stress peak  bpm         Post peak  mmHg         O2 sat peak 98 %         Max HR Percent 93 %         Max  bpm         Recovery Vitals   Recovery  bpm         Recovery /76 mmHg         O2 sat recovery 98 %          Exercise Data   Max  bpm         Exercise duration (min) 10 min         Exercise duration (sec) 30 sec         Estimated workload 8.3 METS         Stress Stage Reached 6         Angina Index 0         Rate Pressure Product 23,688              Stage Data 9/14/23 12:00 PM--9/14/23 12:45 PM    Date/Time BP HR O2 RPP Bicycle - Martinez Symptoms   09/14/23 1216 120/80 76 bpm 98 % 9120 -- none, lungs cta, slight murmur   09/14/23 1222 150/86 90 bpm 96 % 94270 25 none   09/14/23 1224 150/80 100 bpm 96 % 71751 50 none   09/14/23 1226 162/86 104 bpm -- 07546 75 none   09/14/23 1228 176/90 113 bpm -- 90711 100 none   09/14/23 1230 184/94 134 bpm 98 % 17757 125 none   09/14/23 1231 -- 139 bpm 98 % -- 150 mild fatigue   09/14/23 1232 188/80 139 bpm 98 % 85507 -- mild fatigue   09/14/23 1235 160/80 101 bpm -- 97973 --  subsiding   09/14/23 1237 126/76 100 bpm 98 % 37896 -- none     Left Ventricle Measurements    Strain   GLS -17 %               Aortic Valve Measurements    Regurgitation   AV peak gradient 79 mmHg         AV Deceleration Time 1,990 ms         AV regurgitation pressure 1/2 time 577 ms               Tricuspid Valve Measurements    RVSP Parameters   TR Peak Clarke 2.1 m/s         Triscuspid Valve Regurgitation Peak Gradient 17 mmHg               Exam Details    Performed Procedure Technologist Supporting Staff Performing Physician   Echo stress test, exercise Gabo Carmichael, CATALINA Gilliam, RN Christopher Lambert MD         Appointment Date/Status Modality Department    9/14/2023     Completed BE STRESS 1 BE CAR NON INV           Begin Exam End Exam Begin Exam Questionnaires End Exam Questionnaires   9/14/2023 12:00 PM 9/14/2023 12:45 PM CV STRESS QUESTIONNAIRE PATIENT EDUCATION            All Reviewers List    Christopher Lambert MD on 9/18/2023  7:13 AM     Signed    Electronically signed by Christopher Lambert MD on 9/14/23 at 1428 EDT     Counseling / Coordination of Care  Total floor / unit time spent today 40 minutes.  Greater than 50% of total time was spent with the patient and / or family counseling and / or coordination of care.

## 2024-02-06 ENCOUNTER — OFFICE VISIT (OUTPATIENT)
Dept: CARDIOLOGY CLINIC | Facility: CLINIC | Age: 74
End: 2024-02-06
Payer: COMMERCIAL

## 2024-02-06 VITALS
HEIGHT: 68 IN | WEIGHT: 148.7 LBS | OXYGEN SATURATION: 97 % | BODY MASS INDEX: 22.54 KG/M2 | DIASTOLIC BLOOD PRESSURE: 78 MMHG | HEART RATE: 74 BPM | SYSTOLIC BLOOD PRESSURE: 118 MMHG

## 2024-02-06 DIAGNOSIS — I10 PRIMARY HYPERTENSION: Primary | ICD-10-CM

## 2024-02-06 DIAGNOSIS — I42.1 HOCM (HYPERTROPHIC OBSTRUCTIVE CARDIOMYOPATHY) (HCC): ICD-10-CM

## 2024-02-06 DIAGNOSIS — K21.9 GASTROESOPHAGEAL REFLUX DISEASE, UNSPECIFIED WHETHER ESOPHAGITIS PRESENT: ICD-10-CM

## 2024-02-06 DIAGNOSIS — I51.7 LEFT VENTRICULAR HYPERTROPHY BY ELECTROCARDIOGRAM: ICD-10-CM

## 2024-02-06 DIAGNOSIS — R00.2 PALPITATIONS: ICD-10-CM

## 2024-02-06 PROCEDURE — 99214 OFFICE O/P EST MOD 30 MIN: CPT | Performed by: INTERNAL MEDICINE

## 2024-02-06 PROCEDURE — 93000 ELECTROCARDIOGRAM COMPLETE: CPT | Performed by: INTERNAL MEDICINE

## 2024-02-06 RX ORDER — PANTOPRAZOLE SODIUM 40 MG/1
40 TABLET, DELAYED RELEASE ORAL DAILY
Qty: 30 TABLET | Refills: 3 | Status: SHIPPED | OUTPATIENT
Start: 2024-02-06

## 2024-04-10 ENCOUNTER — TELEPHONE (OUTPATIENT)
Dept: DERMATOLOGY | Facility: CLINIC | Age: 74
End: 2024-04-10

## 2024-04-10 NOTE — TELEPHONE ENCOUNTER
Called patient to advise that his appt with Karely on 5/15 needs to be r/s, as she will not be in the office that day. Appt has been r/s for 5/16 @ 3:20 with Karely in [place]. Left a message for patient to call the office to confirm or to r/s if needed.

## 2024-05-16 ENCOUNTER — OFFICE VISIT (OUTPATIENT)
Dept: DERMATOLOGY | Facility: CLINIC | Age: 74
End: 2024-05-16
Payer: COMMERCIAL

## 2024-05-16 VITALS — WEIGHT: 146 LBS | BODY MASS INDEX: 22.13 KG/M2 | HEIGHT: 68 IN

## 2024-05-16 DIAGNOSIS — D22.5 MULTIPLE BENIGN MELANOCYTIC NEVI OF BOTH UPPER EXTREMITIES, BOTH LOWER EXTREMITIES, AND TRUNK: Primary | ICD-10-CM

## 2024-05-16 DIAGNOSIS — D22.71 MULTIPLE BENIGN MELANOCYTIC NEVI OF BOTH UPPER EXTREMITIES, BOTH LOWER EXTREMITIES, AND TRUNK: Primary | ICD-10-CM

## 2024-05-16 DIAGNOSIS — D22.62 MULTIPLE BENIGN MELANOCYTIC NEVI OF BOTH UPPER EXTREMITIES, BOTH LOWER EXTREMITIES, AND TRUNK: Primary | ICD-10-CM

## 2024-05-16 DIAGNOSIS — D22.61 MULTIPLE BENIGN MELANOCYTIC NEVI OF BOTH UPPER EXTREMITIES, BOTH LOWER EXTREMITIES, AND TRUNK: Primary | ICD-10-CM

## 2024-05-16 DIAGNOSIS — D18.01 CHERRY ANGIOMA: ICD-10-CM

## 2024-05-16 DIAGNOSIS — L57.0 ACTINIC KERATOSIS: ICD-10-CM

## 2024-05-16 DIAGNOSIS — L82.1 SEBORRHEIC KERATOSIS: ICD-10-CM

## 2024-05-16 DIAGNOSIS — M67.449 DIGITAL MUCOUS CYST: ICD-10-CM

## 2024-05-16 DIAGNOSIS — D22.72 MULTIPLE BENIGN MELANOCYTIC NEVI OF BOTH UPPER EXTREMITIES, BOTH LOWER EXTREMITIES, AND TRUNK: Primary | ICD-10-CM

## 2024-05-16 DIAGNOSIS — L81.4 LENTIGO: ICD-10-CM

## 2024-05-16 DIAGNOSIS — Z87.2 HISTORY OF ACTINIC KERATOSIS: ICD-10-CM

## 2024-05-16 PROCEDURE — 99214 OFFICE O/P EST MOD 30 MIN: CPT

## 2024-05-16 PROCEDURE — 17000 DESTRUCT PREMALG LESION: CPT

## 2024-05-16 RX ORDER — TETRACYCLINE HYDROCHLORIDE 500 MG/1
CAPSULE ORAL
COMMUNITY
Start: 2024-05-16

## 2024-05-16 RX ORDER — METRONIDAZOLE 250 MG/1
TABLET ORAL
COMMUNITY
Start: 2024-05-16

## 2024-05-16 NOTE — PATIENT INSTRUCTIONS
What is skin cancer?  Skin cancer is unfortunately very common. That's why we are here to help you on your journey to healthy happy skin! There are two main types of skin cancer: melanoma and non-melanoma skin cancer. Melanoma is a form of skin cancer that often arises within an existing nevus or mole. However, this is not always the case. Melanoma can arise anywhere (not only where you have moles right now). Melanoma can run in families, so letting us know about your family history is important. Non-melanoma skin cancer is the most common type of cancer in the [place]. The two main types of non-melanoma skin cancers are basal cell carcinomas (BCC) and squamous cell carcinoma (SCC). These cancers tend to be less aggressive than melanomas but are still important to look for and treat.    What can I do to prevent skin cancer?  One of the largest risk factors for skin cancer is sun exposure or UV radiation. Therefore, sun protection is castillo! Here are some great tips for protecting yourself!  Try to avoid direct sun exposure during peak sun hours (10 AM to 2 PM)  Remember you get A LOT of sun even under cloud coverage and through care windows!  When choosing a sunscreen, look for one that says “broad spectrum” sunscreen. This means it protects you from more of the harmful UV rays.   Choose a sunscreen that is SPF 30 or greater for best protection.   Apply sunscreen to all sun-exposed skin and reapply every 2 hours.   Consider sun protective clothing! Great additions to your sun protective clothing wardrobe include broad brimmed hats, sunglasses, UPF clothing.  Avoid tanning salons. These have been shown to be very harmful in terms of your risk of skin cancer.   Avoid “base tans”. We now know that tans are dangerous (not just sun burns). If you want to have a tan for a trip, consider a spray tan!    Should I check my skin at home between my dermatology appointments?  Yes! It's always a great idea to look at your  skin on a regular basis. Here are some things to look for when monitoring your skin.   For melanoma, look for the ABCDE's!  A = Asymmetry. Look for a spot where one half does not match the other!  B = Borders. Look for a spot that has jagged, ragged or irregular borders.  C = Color. Look for a spot that is not evenly colored and often includes multiple colors, especially true black, red, white, blue, grey.   D = Diameter. Look for a spot that is larger than the size of a pencil eraser.  E = Evolution. If you ever have a spot that is changing in shape, color, size or symptoms (becomes itchy, painful or starts to bleed), always call us!  For non-melanoma skin cancers, look for a new, pink spot that is not going away, especially one that is itchy, painful or bleeding.     What should I do if I see a spot that is concerning for melanoma or non-melanoma skin cancer?  If you are ever concerned, call us! Do not wait for your next appointment. We want to help!     ACTINIC KERATOSES  - Relevant exam: On the scalp are scaly pink macules without palpable dermal component    - Exam and clinical history consistent with actinic keratoses  - Discussed that these lesions are considered premalignant with the potential to evolve into squamous cell carcinoma.   - Discussed treatment options, which may inclue liquid nitrogen destruction, topical immunotherapy including risks, benefits  - Patient counseled to return to the office in 4-6 weeks after completion of treatment for recheck if not resolved at which time retreatment or biopsy to rule out SCC will be determined based on clinic findings    OPTION 1:    PROCEDURE:  DESTRUCTION OF PRE-MALIGNANT LESIONS    - After a thorough discussion of treatment options and risk/benefits/alternatives (including but not limited to local pain, scarring, dyspigmentation, blistering, and possible superinfection), verbal and written consent were obtained and the aforementioned lesions were treated on  with cryotherapy using liquid nitrogen x 1 cycle for 5-10 seconds.      The patient tolerated the procedure well, and after-care instructions were provided.   Patient instructed to put Vaseline on site and to call if the spot does not go away

## 2024-05-16 NOTE — PROGRESS NOTES
"[facility] Note     Patient Name: Bubba Calle  Encounter Date: 5/16/24     Have you been cared for by a [facility] Dermatologist in the last 3 years and, if so, which description applies to you?    Yes.  I have been here within the last 3 years, and my medical history has NOT changed since that time.  I am MALE/not capable of bearing children.    REVIEW OF SYSTEMS:  Have you recently had or currently have any of the following? No changes in my recent health.   PAST MEDICAL HISTORY:  Have you personally ever had or currently have any of the following?  If \"YES,\" then please provide more detail. No changes in my medical history.   HISTORY OF IMMUNOSUPPRESSION: Do you have a history of any of the following:  Systemic Immunosuppression such as Diabetes, Biologic or Immunotherapy, Chemotherapy, Organ Transplantation, Bone Marrow Transplantation?  No     Answering \"YES\" requires the addition of the dotphrase \"IMMUNOSUPPRESSED\" as the first diagnosis of the patient's visit.   FAMILY HISTORY:  Any \"first degree relatives\" (parent, brother, sister, or child) with the following?    No changes in my family's known health.   PATIENT EXPERIENCE:    Do you want the Dermatologist to perform a COMPLETE skin exam today including a clinical examination under the \"bra and underwear\" areas?  Yes  If necessary, do we have your permission to call and leave a detailed message on your Preferred Phone number that includes your specific medical information?  Yes      No Known Allergies   Current Outpatient Medications:     Ascorbic Acid 500 MG CAPS, Take 1,000 mg by mouth 2 (two) times a day  , Disp: , Rfl:     losartan (COZAAR) 25 mg tablet, Take 1 tablet (25 mg total) by mouth in the morning., Disp: 90 tablet, Rfl: 3    multivitamin (THERAGRAN) TABS, Take 1 tablet by mouth daily , Disp: , Rfl:     metroNIDAZOLE (FLAGYL) 250 mg tablet, , Disp: , Rfl:     tetracycline (ACHROMYCIN,SUMYCIN) 500 MG capsule, , Disp: , Rfl:  "          Whom besides the patient is providing clinical information about today's encounter?   NO ADDITIONAL HISTORIAN (patient alone provided history)    Physical Exam and Assessment/Plan by Diagnosis:    SEBORRHEIC KERATOSES  - Relevant exam: Scattered over the trunk/extremities are waxy brown to black plaques and papules with stuck on appearance and consistent dermoscopy  - Exam and clinical history consistent with seborrheic keratoses  - Counseled that these are benign growths that do not require treatment    MELANOCYTIC NEVI  -Relevant exam: Scattered over the trunk/extremities are homogenously pigmented brown macules and papules. ELM performed and without concerning findings. No outliers unless otherwise noted in today's note  - Exam and clinical history consistent with melanocytic nevi  - Counseled to return to clinic prior to scheduled appointment should any of these lesions change or should any new lesions of concern arise  - Counseled on use of sun protection daily. Reviewed latest FDA sunscreen guidelines, including use of broad spectrum (UVA and UVB blocking) sunscreen or sun protective clothing with SPF 30-50 every 2-3 hours and reapplied after exposure to water    LENTIGINES  OTHER SKIN CHANGES DUE TO CHRONIC EXPOSURE TO NONIONIZING RADIATION  - Relevant exam: Over sun exposed areas are brown macules. ELM performed and without concerning findings.  - Exam and clinical history consistent with lentigines.  - Counseled to return to clinic prior to scheduled appointment should any of these lesions change or should any new lesions of concern arise.  - Recommended use of sunscreen as above and below.    CHERRY ANGIOMAS  - Relevant exam: Scattered over the trunk/extremities are red papules  - Exam and clinical history consistent with cherry angiomas  - Educated that these are benign    ACTINIC KERATOSES  - Relevant exam: On the scalp are scaly pink macules without palpable dermal component    - Exam and  clinical history consistent with actinic keratoses  - Discussed that these lesions are considered premalignant with the potential to evolve into squamous cell carcinoma.   - Discussed treatment options, which may inclue liquid nitrogen destruction, topical immunotherapy including risks, benefits  - Patient counseled to return to the office in 4-6 weeks after completion of treatment for recheck if not resolved at which time retreatment or biopsy to rule out SCC will be determined based on clinic findings  -Biopsy previously done at this same site a year ago. Tissue Exam: [id number]  and scaliness has returned.     OPTION 1:    PROCEDURE:  DESTRUCTION OF PRE-MALIGNANT LESIONS    - After a thorough discussion of treatment options and risk/benefits/alternatives (including but not limited to local pain, scarring, dyspigmentation, blistering, and possible superinfection), verbal and written consent were obtained and the aforementioned lesions were treated on with cryotherapy using liquid nitrogen x 1 cycle for 5-10 seconds.    TOTAL NUMBER of  1 pre-malignant lesions were treated today on the ANATOMIC LOCATION: scalp.     The patient tolerated the procedure well, and after-care instructions were provided.   Patient instructed to put Vaseline on site and to call if the spot does not go away    DIGITAL MUCOUS CYST     Physical Exam:  Anatomic Location Affected:  Right 2nd toe  Morphological Description:  Crusted papule with overlying scale  Pertinent Positives:  Pertinent Negatives:      Additional History of Present Condition:  Present for years.      Assessment and Plan:   Based on a thorough discussion of this condition and the management approach to it (including a comprehensive discussion of the known risks, side effects and potential benefits of treatment), the patient (family) agrees to implement the following specific plan:                - Patient has seen orthopedic surgeon who advised just monitoring and not  surgically removing   - Reassured benign     Scribe Attestation      I,:  Monalisa Collins MA am acting as a scribe while in the presence of the attending physician.:       I,:  Karely Romero PA-C personally performed the services described in this documentation    as scribed in my presence.:

## 2024-08-05 NOTE — PROGRESS NOTES
HCM Clinic Follow-up Visit - Cardiology   Bubba Calle 73 y.o. 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 His palpitations have improved except for occasional skipped beats and he has had no chest discomfort. He was diagnosed with H,Pylori infection and was treated for it with a course of antibiotics. Repeat testing is pending. His blood pressure is well controlled of 25 mg of losartan daily and occasional blood pressure checks at home have also shown good results. He is due for laboratory tests that will be done next month. Today, physical examination and cardiac auscultation show no abnormal result and a transthoracic echocardiogram also shows no change compared to prior study:      Left Ventricle: Left ventricular cavity size is normal. Wall thickness is mildly increased. There is mild asymmetric hypertrophy of the basal septal wall. The left ventricular ejection fraction is 60%. Systolic function is normal. Global longitudinal strain is normal at -18%. Wall motion is normal. Diastolic function is mildly abnormal, consistent with grade I (abnormal) relaxation. There is no LV dynamic obstruction.    Aorta: The aortic root is normal in size. The ascending aorta is mildly dilated. The aortic root is 2.40 cm. The ascending aorta is 4 cm.    Physician Requesting Consult: Primary Care  Reason for Consult / Principal Problem: Suspected hypertrophic cardiomyopathy     HPI: Bubba  Alva is a 72 year old retired  who presented with complaints of palpitation and chest discomfort. The symptoms began in 2014 and triggered work up that included pharmacologic myocardial perfusion imaging (2014) and echocardiogram (2014) that were reportedly negative and a Holter monitor (2014) that showed premature atrial and ventricular premature beats that did not correlate with the timing of his symptoms (2014). He continued to be symptomatic and was again evaluated in 2016 and 2018. Most recently, he had a 2-week ambulatory ECG monitor that also reportedly showed no high grade arrhythmias or significant pauses. He is healthy, physically fit with a BMI of 22.5 kg/m2, plays golf for 2 days and works part time for 3 days a week. His past medical history is significant for testicular cancer (seminoma) s/p right orchidectomy and recent diagnosis of prostate enlargement on a CT scan taken in follow up evaluation of his cancer. He does have symptoms of prostatism and in particular wakes up more than once at night to urinate. Mr Calle is quite concerned about his symptoms of palpitation, chest discomfort and occasional dizziness particularly after the hospitalization of her sister. He completed an echocardiogram on 10- that showed normal LV size and systolic function with an ejection fraction of 60% and no regional wall motion abnormalities; no evidence of LVH, no significant valvular abnormality and borderline increase in the size of the aortic root (3.7 cm). He also had a treadmill exercise performed that showed excellent exercise tolerance (duration of exercise was 13 min and maximal work rate was 15.3 METs), reached a peak heart rate of 157 bpm (101 % of maximal predicted heart rate). He was, however, hypertensive at baseline (162/80) with appropriate blood pressure response to stress. There was no chest pain during stress, the stress ECG was normal and no significant arrhythmias were noted  (isolated premature ventricular beats). His blood pressure was high during echocardiography as well. He does not add salt to his diet although he does eat out often, has maintained an ideal body weight and is quite active. He does have strong family history of hypertension. His lipid profile is being checked by his family physician. He was started on bisoprolol to treat high blood pressure and help with the sensation of skipped heart beats that was his main concern at that point.      On 1/29/2019: Mr Calle has done extremely well since last office visit. He has brought with him home blood pressure readings that range from 115 to 130 mmHg systolic and 70 to 80 mmHg diastolic. He denies chest pain, shortness of breath and dizziness and feels his palpitation has improved significantly. He exercises regularly and remains active, follows a heart healthy diet low in sodium and is compliant with his medications. I reviewed the medical records of his sister, Lyubov Watts, who has been evaluated at the [facility] in [place] and appears to me that she has a similar problem likely caused by systemic hypertension. In her evaluation, much emphasis has been given to the reportedly sudden death of their father at age 42 and to Mr Calle's diagnosis of HCM. However, the circumstances of father's death is unclear and the contribution of excess alcohol intake may have been significant. Mr Calle's mild concentric left ventricular hypertrophy is also well explained by his systemic hypertension. Genetic testing at this point does not seem to be helpful considering the otherwise benign family history and mild phenotypic expression. His risk stratification is complete and he would not require further testing at this point.     On 5/21/2019: Mr Calle has not tolerated the bisoprolol that was started for both hypertension and palpitation related to premature ventricular contractions. The medication was  effective for both but resulted in insomnia, anxiety, chest pain, cold feet and hands, joint pain and most importantly problems getting and maintaining an erection. Even cutting the dose from 5 to 2.5 mg per day did not resolve the side effects and he stopped the medication altogether and noted rebound hypertension (140/90) and increased heart rate (resting heart rate of 90-95 bpm). He was then started on losartan that has improved blood pressure control but has not done much for the rapid heart rate and palpitation. Today I have switched him from losartan to diltiazem hoping to be able to control both hypertension and palpitation. He will check his blood pressure on a daily basis and send me the results electronically. He has had a CT of chest with contrast on 3- outside the network that has reported mild dilation of the ascending aorta (4.1 cm). He is scheduled to have a repeat study done next March. I have asked him to also let me know hoe diltiazem is working for his palpitation. If tolerated, it will be switched to once daily formulation of diltiazem.     On 11/26/2019: Mr Burrows has not been tolerating the diltiazem and has stopped taking it. He particularly had noticed problems with erectile dysfunction. His home blood pressure readings show values as high as 159/96 mmHg. Today in the clinic his blood pressure is 136/84 mmHg. He denies headaches but has noted that his palpitation has been quite worse after stopping the diltiazem. His cancer is under control and has not shown recurrence after surgery and his prostate problem is stable. He remains active and works part time. I have advised Mr Calle to continue medical therapy for his systemic hypertension and particularly do not stop the medication on his own. He was started on doxazocin today at a very small dose hoping that it would also help with his prostatism symptoms. I have also ordered an extended ambulatory monitor for him to evaluate his  frequent and disabling symptom of palpitation. He will be emailing his blood pressure readings and let me know how he feels on the new medication in about a week. He has had blood work done 2 months ago that I do not have access to and he will be sending those to me as well.     3-3-2020: Mr Burrows has been managing his hypertension with diet and exercise and has not taken any of his antihypertensive medications. He continues to complain of insomnia and nocturia due to benign prostate enlargement but chooses not to take the prescribed doxazocin. He is scheduled to have chest and abdomen CT to evaluate his ascending aortic aneurysm (41 mm) and the seminoma post surgical right orchidectomy. He will also see his oncologist after imaging studies are done. He will also have blood work including lipid profile. Ambulatory ECG monitor performed in December showed few isolated atrial and ventricular premature beats that did not correlate with the sensation of skipped beats. 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 He also plays golf on Mondays and Wednesdays. He is following a heart healthy diet and has kept a normal body mass index.     3-: Mr Calle was seen last 3-3-2020. He was then evaluated by his oncologist and had CT scans on 3- and 7- for follow up of his seminoma and right external iliac node. Blood work done on 7- showed normal normal WBC (5.6k), HGB (15.6), HCT (45.2), PLT (213K), CREATININE (0.94), BUN (18), electrolytes and liver enzymes. The summary of the CT scan performed on 7-: Stable right external iliac lymph node; no evidence of metastatic disease in the chest, abdomen or pelvis; no acute or suspicious process in the chest, abdomen or pelvis. On September 3-2020 he had a transthoracic echocardiogram that showed Normal LV size and systolic function with an ejection fraction of 60%, grade 1 diastolic dysfunction,  borderline atrial sizes and no significant valvular abnormality. An ECG done today is normal except for left atrial abnormality. He is complaining of very uncomfortable feelings with skipped beats and palpitation on a daily basis. He has also noted that his blood pressure has been elevated at home (systolic values between 130 and 140 mmHg. He is however no taking the bisoprolol due to side effects. Today, his blood pressure is 140/88 mmHg in the office. I have asked him to do a 48 hour ambulatory ECG monitor and a 24-hour blood pressure monitor to objectively assess his palpitation and hypertension. He has not yet decided to have vaccination for COVID.     9-: Mr. Calle had  26 hour of ambulatory heart monitoring done as a part of evaluation for palpitation, which showed average heart rate of 85 bpm, 23 PVCs (0% of total beats ), 681 PACs (0.5% of total beats), patient's symptoms of chest pain coincided with sinus rhythm with only 1 episode of skipped beat sensation coinciding withPAC, 35 seconds of tachycardia which did not perceived symptomatic by patient. He continues having palpitations, mainly at night, around 2-3 a.m. Although did not notice association with food intake might have some association with the alcohol intake and his working on dietary modifications.  We recommended to try over-the-counter H2-antagonist such as Famotidine as undiagnosed GERD could be possible etiology of his symptoms.  Will also discussed dietary changes the could potentially prevent exacerbation of GERD.  Mr. Calle had 24 hour ambulatory blood pressure monitoring (03/31/2021 to 04/01/2021), which showed daytime average blood pressure of 143/ 90 mmHg, nighttime average blood pressure of 126/76 mmHg, with average of 137/86 mmHg. He was started on losartan 50 mg daily which he decreased to 25 mg daily due to episodes of lightheadedness. Mr. Calle brought today results of the most recent blood work such as CMP, CBC,  Urinalysis, lipid panel (LDL 89 mg/dL, total cholesterol 179 mg/dL, triglycerides 57 mg/dL, HDL 79 mg/dL), uric acid with all being within normal limits. The most recent PSA was slighlty elevated at 4.1 ng/mL (previously 2.2 ng/mL) and he will have PSA levels rechecked in few month. At this time he sees oncologist only as needed as the most recent survailance imaging did not show new changes. We will order lactate dehydrogenase, AFP and hCG levels to be checked with the plan to follow up with his oncologist if any abnormalities are noted. Will also check TSH levels given history of palpitations. He will be seen in [facility] clinic in 6 month.      5-: Mr. Calle was seen and examined with cardiology fellow, Dr. Lindsey. He is asymptomatic, continues being active. He was seen by urologist in 4/2022 at [facility] to follow up on abnormal PSA level of 4.1 ng/ml from 8/2021, which subsequently improved to 2.4 ng/ml in 12/2022. He was recommended to have PSA rechecked in 12 month along with CT scan. Patient declined the latter one as was trying to limit his radiation exposure. He continues taking losartan 25 mg qd with blood pressure being at goal. Home SBP readings are around 110-120 mmHg. We encouraged him to continue staying active. Will check TTE prior to the next visit to follow up on aortic valve insufficiency as well as ascending thoracic aortic dilatation (3.9 x 4.2 cm by CT in 7/2020). Mr. Calle will be seen in [facility] clinic in 6 month.      2-: Since his last office visit in May 2022 patient tells me that overall he feels well from a cardiac standpoint.  He does continue to note palpitations as he did during the past several office visits.  He states that these palpitations occur most frequently in the early morning hours between 3 and 5 AM.  He states he is unsure if these palpitations wake him up or if he just happens to be awake at that time and he is noticing them.  He will occasionally notice some  as well if he gets up to urinate in the middle of the night.  Overall, the patient states he does not notice many palpitations during the day.  He denies any associated chest discomfort, shortness of breath or lightheadedness when these palpitations occur.  During our last office visit had been discussed that his increased palpitations in the early morning may be related to acid reflux/GERD and it was recommended that he trial over-the-counter H2 antagonist such as famotidine but the patient states he has not trialed this medication as he prefers to not take medicines.  He does state that he agrees undiagnosed acid reflux/GERD may be underlying etiology to these palpitations as he notes that if he eats more offensive foods he will have increased palpitations the next day.  He also states he has trialed Tums before bed on days that he knows he has eaten more offensive foods and he often notes the palpitations are lessened.  I did again review the pharmacodynamics of H2 blockers and how this may help him but he again declines to take this on a daily basis.  The patient does state that he has now completely eliminated all caffeine and alcohol his diet over the past several weeks and he notes this has helped to lessen his palpitations as well.  With regards to exercise the patient does resistance training with resistance bands roughly 30 minutes a day almost every day of the week with no complaints of chest discomfort or shortness of breath and this training does not precipitate his palpitations.  His most recent 48-hour Holter monitor from March 2021 revealed PACs and PVCs but always correlating with his symptoms.  I did review with the patient that if these symptoms become more prominent we can retrial beta-blockers.  He denies any syncope or syncope also denies any lower extremity edema or orthopnea.  Occasionally the patient will note some lightheadedness when he is exercising but he states that he has tracked this  back to days where he was not as hydrated as he should be.  He notes this lightheadedness does not occur every time he exercises and he typically does not feel lightheaded at any other time during the day.  We did review that his blood pressure overall is controlled.  Upon initial check on arrival to the office visit his blood pressure was 136/87 on my recheck at the end of office visit was 122/78.  The patient states that he does check his blood pressure at home frequently and his systolic blood pressures typically in the mid 110's. He follows a low salt diet.  Patient attempts to follow a low-fat/low-cholesterol diet as well.  He states that he did have labs mid 2022 for his PCP and we will contact his PCPs office for these results.  The patient states that he no longer following with urology telling me that his PCP is capable of following his PSA and he did not want any further radiologic screening.  We did review his prior diagnosis of ascending aortic aneurysm last checked in July 2020 at [facility] CT of chest/abdomen/pelvis: is mild fusiform dilatation of the ascending thoracic aorta measuring 3.9 x 4.2 cm previously measuring 4.0 cm in maximal dimension.  Did offer repeat CT scan as it has been several years since his last imaging and he declines updated imaging at this time.Since his last office visit on 5- the patient had updated echocardiogram performed with the following results: Left Ventricle: Left ventricular cavity size is normal. Wall thickness is mildly increased. There is mild asymmetric hypertrophy of the basal septal wall. The left ventricular ejection fraction is 62%. Systolic function is normal. Global longitudinal strain is reduced at -15%. Wall motion is normal. Diastolic function is mildly abnormal, consistent with grade I (abnormal) relaxation. There is no LV dynamic obstruction. No major valvular abnormalities noted. Aortic root normal in size.     8-8-2023: Since his last office visit  "on 2- he was seen by Dermatology for a lesion on central frontal scalp. This was biopsied and determined to be precancerous and was excised. He was also seen by GI for changes in bowel habits and celiac panel as well as inflammatory markers were negative. His bowel changes improved with addition of Metamucil. He does note some acid reflux type symptoms mostly at night that he feels is associated with specific foods. As it had been discussed with him in the past, we did again reiterate that his nightly symptoms of chest pain/palpitations may indeed be related to GERD and we have again encouraged him to reduce consumption of potentially provocative foods and consider use of an H2 blocker vs. PPI. Since his last visit, he says he has been having several cardiac complaints. He continues to note palpitations describing the feeling as \"skipping beats\". He notes this is becoming more frequent and happening daily but he notes it mostly intensely at night. He did just complete wearing a two week ZIO monitor that he mailed out over this weekend, we will have results in a few days. His TSH was WNL in June of this year. He also notes he is feeling more frequent lightheadedness as well. He claims he is well hydrated through the day. He is having chest discomfort as well but this is not brought on by exertion as he does exercise almost daily and exercising does not produce symptoms. His blood pressure readings have been acceptable at home on losartan 25 mg daily. He has not been following up with Urology or Oncology anymore as he does not feel follow ups are necessary. We have encouraged him to continue exercise as well as following a heart healthy, low fat/cholesterol/sodium diet. For his symptoms, we will await the results recent ZIO monitor and we will also order exercise bike stress echo to evaluate intracavitary or LV outflow tract obstruction as the cause of his symptoms.      2-6-2024: Mr Calle was last seen in the " HCM Clinic on 8-8-2023 at which time he did complain of continuing palpitation particularly at night. The results of a 2 week ambulatory heart monitor was received on 8- and showed:     Patient had a min HR of 55 bpm, max HR of 135 bpm, and avg HR of 83 bpm. Predominant underlying rhythm was Sinus Rhythm. Slight P wave morphology changes were noted. 1 run of Supraventricular Tachycardia occurred lasting 13 beats with a max rate of 135 bpm (avg 106 bpm). Some episodes of Supraventricular Tachycardia may be possible Atrial Tachycardia with variable block. Isolated SVEs were occasional (1.7%, 49660), SVE Couplets were rare (<1.0%, 33), and no SVE Triplets were present. Isolated VEs were rare (<1.0%, 480), VE Triplets were rare (<1.0%, 1), and no VE Couplets were present.     He also underwent stress echocardiography for continued symptoms on 9- that revealed:     A bicycle protocol stress test was performed. The patient reached stage 6.0 of the protocol after exercising for 10 min and 30 sec and had a maximal HR of 139 bpm (93 % of MPHR) and 8.3 METS. The patient experienced no angina during the test. The patient achieved the target heart rate. The patient reported mild fatigue during the stress test. Symptoms began during stress and ended during recovery. Blood pressure demonstrated a normal response and heart rate demonstrated a normal response to stress.        Left Ventricle: Left ventricular cavity size is normal. Wall thickness is mildly increased. There is mild asymmetric hypertrophy of the basal septal wall. The left ventricular ejection fraction is 65%. Systolic function is normal. Global longitudinal strain is normal at -17%. Wall motion is normal. Diastolic function is mildly abnormal, consistent with grade I (abnormal) relaxation.    Aortic Valve: There is mild regurgitation.    Stress ECG: No ST deviation is noted. There were no arrhythmias during recovery. . The ECG was negative for  "ischemia. The stress ECG is negative for ischemia after maximal exercise, without reproduction of symptoms.    Post Stress Echo: Left ventricle cavity has normal reduction in size post-stress. The left ventricle systolic function is hyperdynamic post-stress.    Echo Post Impression: This study shows a low prognostic risk. The study is normal.     Today, he continues to feel palpitations that he describes as a \"skipping\" occurring daily, mostly in the evening/night. We did review his extended monitor which overall was benign and most of his triggered events occurred with a normal rhythm. He states he feels his wrist at the time and notes \"my heart is skipping, I can feel it when I take my pulse\". His EKG today shows NSR with no ectopy.           He does perform regular exercise several days a week doing cardiovascular exercise as well as resistance bands and stretching. He can perform these activities with no cardiac complaints and exercise does not precipitate palpitations. When he feels his heart \"skipping\" he will have some chest tightness as well as shortness of breath and lightheadedness.  We did discuss that his GERD may be causing an issue as well and may be contributing to his symptoms. We have recommended that he trial pantoprazole 40 mg once daily for a few weeks to see if this will help his symptoms. His blood pressure is acceptable today on losartan 25 mg daily. A low sodium diet was reinforced.     Risk stratification:  Nonsustained ventricular tachycardia-no         Severe left ventricular hypertrophy-no  Family history of sudden death-no  Unexplained syncope-no  LVOT obstruction-no  Atrial fibrillation and left atrial dilation-no  Age-73 years old  NYHA-class I-II  Myocardial fibrosis-no cardiac MRI completed  LV systolic dysfunction-no  Apical aneurysm-no     Review of systems: Denies chest pain and dyspnea with exertion; continues to note palpitations mostly in the evening/night (if his palpitations " "last for awhile then he can have chest pain, shortness of breath and lightheadedness); no LE edema/orthopnea; no dizziness/lightheadedness on a regular basis     Family history: Father  at age 42 suddenly for uncertain reason although he had drinking problem, mother  at age 84 () of stroke, she had dementia and bipolar disorder, a 73 year old sister has had diabetes and has been diagnosed with \"hypertrophic cardiomyopathy\" after being taken to a hospital in [place] with chest pain and shortness of breath while running, a brother committed suicide by shooting himself at age 61 (). He also had problem with excess alcohol intake. Mr Calle has 2 children, a 46 year old son with diabetes (on insulin pump) has never been screened to the patient's knowledege and a 41 year old daughter without known health problem also has not been screened. He also has six grandchildren, 2 from his son [boy (19) and a girl (14)] and 5 from his daughter (ages 9, 6 and 3 and 1 1/2). The grandchildren have had no heart problems and his daughter has gone through pregnancies without any issue.      Genetic testing:  Deferred given benign family history    Historical Information   Past Medical History:   Diagnosis Date    Cancer (HCC)     Hypertension     Prostatism     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(HCC)      Past Surgical History:   Procedure Laterality Date    COLONOSCOPY      SKIN BIOPSY       No family history on file.  Current Outpatient Medications on File Prior to Visit   Medication Sig Dispense Refill    Ascorbic Acid 500 MG CAPS Take 1,000 mg by mouth 2 (two) times a day        losartan (COZAAR) 25 mg tablet Take 1 tablet (25 mg total) by mouth in the morning. 90 tablet 3    multivitamin (THERAGRAN) TABS Take 1 tablet by mouth daily       metroNIDAZOLE (FLAGYL) 250 mg tablet  (Patient not taking: Reported on 2024)      tetracycline (ACHROMYCIN,SUMYCIN) 500 MG capsule  (Patient not taking: Reported on " "5/16/2024)       No current facility-administered medications on file prior to visit.     No Known Allergies  Social History     Substance and Sexual Activity   Alcohol Use Not Currently    Comment: 1-4 weekly     Social History     Substance and Sexual Activity   Drug Use No     Social History     Tobacco Use   Smoking Status Never   Smokeless Tobacco Never     Objective   Vitals:   Vitals:    08/06/24 0845   BP: 112/58   BP Location: Left arm   Patient Position: Sitting   Cuff Size: Standard   Pulse: 77   SpO2: 97%   Weight: 65.9 kg (145 lb 4.8 oz)   Body surface area is 1.78 meters squared.  Body mass index is 22.09 kg/m².    Invasive Devices       None                 Physical Exam:  GEN: Bubba Calle appears well, alert and oriented x 3, pleasant and cooperative   HEENT: pupils equal, round, and reactive to light; extraocular muscles intact  NECK: supple, no carotid bruits   HEART: regular rhythm, normal S1 and S2, +S4 gallop, no murmurs, clicks, or rubs   LUNGS: clear to auscultation bilaterally; no wheezes, rales, or rhonchi   ABDOMEN: normal bowel sounds, soft, no tenderness, no distention  EXTREMITIES: peripheral pulses normal; no clubbing, cyanosis, or edema  NEURO: no focal findings   SKIN: normal without suspicious lesions on exposed skin    Lab Results:   No results found for: \"WBC\", \"RBC\", \"HGB\", \"HCT\", \"MCV\", \"PLT\", \"RDW\"  Lab Results   Component Value Date    K 4.2 07/20/2020     07/20/2020    CO2 29 07/20/2020    BUN 18 07/20/2020    CREATININE 0.94 07/20/2020    EGFR 84 07/20/2020    CALCIUM 9.0 07/20/2020    AST 16 07/20/2020    ALT 30 07/20/2020    ALKPHOS 56 07/20/2020     Lab Results   Component Value Date    MG 2.2 10/24/2018     No results found for: \"CHOL\", \"HDL\", \"TRIG\", \"LDLCALC\"  No results found for: \"IFM6LVZYICCJ\", \"T3FREE\", \"FREET4\", \"U8FCSMR\", \"H9VOVJC\"    Imaging:   I have personally reviewed pertinent films in PACS    Cardiac testing:   Results for orders placed during the " hospital encounter of 20    Echo complete with contrast if indicated    [place] [address]    Transthoracic Echocardiogram  2D, M-mode, Doppler, and Color Doppler    Study date:  03-Sep-2020    Patient: ANTONIO LLANOS  MR number: [id number]  Account number: [id number]  : 1950  Age: 69 years  Gender: Male  Status: Outpatient  Location: [facility]  Height: 68 in  Weight: 141.7 lb  BP: 119/ 76 mmHg    Indications: Essential Hypertension    Diagnoses: I10. - Essential (primary) hypertension    Sonographer:  Alli Melendez RDCS  Primary Physician:  Gabriel Wilks MD  Referring Physician:  Christopher Lambert MD  Group:  [facility]  Interpreting Physician:  Vanesa Fair DO    SUMMARY    LEFT VENTRICLE:  Systolic function was normal. Ejection fraction was estimated to be 60 %.  There were no regional wall motion abnormalities.  Doppler parameters were consistent with abnormal left ventricular relaxation (grade 1 diastolic dysfunction).    RIGHT ATRIUM:  The right atrial size was at the upper limits of normal to mildly dilated.    AORTIC VALVE:  There was trace regurgitation.    TRICUSPID VALVE:  There was trace regurgitation.    COMPARISONS:  The previous study was not available for direct comparison.    HISTORY: PRIOR HISTORY: Hypertension, Hypertrophic Obstructive Cardiomyopathy, Palpitations    PROCEDURE: The study was performed in the [facility]. This was a routine study. The transthoracic approach was used. The study included complete 2D imaging, M-mode, complete spectral Doppler, and color Doppler.  The heart rate was 76 bpm, at the start of the study. Images were obtained from the parasternal, apical, subcostal, and suprasternal notch acoustic windows. Image quality was adequate.    LEFT VENTRICLE: Size was normal. Systolic function was normal. Ejection  fraction was estimated to be 60 %. There were no regional wall motion abnormalities. Wall thickness was normal. DOPPLER: Doppler parameters were consistent with  abnormal left ventricular relaxation (grade 1 diastolic dysfunction).    RIGHT VENTRICLE: The size was normal. Systolic function was normal. Wall thickness was normal.    LEFT ATRIUM: Size was normal.    RIGHT ATRIUM: The right atrial size was at the upper limits of normal to mildly dilated.    MITRAL VALVE: Valve structure was normal. There was normal leaflet separation. DOPPLER: The transmitral velocity was within the normal range. There was no evidence for stenosis. There was no regurgitation.    AORTIC VALVE: The valve was trileaflet. Leaflets exhibited normal thickness and normal cuspal separation. DOPPLER: Transaortic velocity was within the normal range. There was no evidence for stenosis. There was trace regurgitation.    TRICUSPID VALVE: The valve structure was normal. There was normal leaflet separation. DOPPLER: The transtricuspid velocity was within the normal range. There was no evidence for stenosis. There was trace regurgitation.    PULMONIC VALVE: Not well visualized. DOPPLER: The transpulmonic velocity was within the normal range. There was no significant regurgitation.    PERICARDIUM: There was no pericardial effusion. The pericardium was normal in appearance.    SYSTEMIC VEINS: IVC: The inferior vena cava was normal in size and course. Respirophasic changes were normal.    SYSTEM MEASUREMENT TABLES    2D  %FS: 34.06 %  Ao Diam: 2.63 cm  EDV(Teich): 61.93 ml  EF(Teich): 63.77 %  ESV(Cube): 15.72 ml  ESV(Teich): 22.44 ml  IVC: 18.21 mm  IVSd: 0.91 cm  LA Area: 11.61 cm2  LA Diam: 2.56 cm  LVEDV MOD A4C: 65.34 ml  LVEF MOD A4C: 61.53 %  LVESV MOD A4C: 25.14 ml  LVIDd: 3.8 cm  LVIDs: 2.51 cm  LVLd A4C: 8.69 cm  LVLs A4C: 7.44 cm  LVOT Diam: 1.96 cm  LVPWd: 1.1 cm  RA Area: 18.48 cm2  RV Diam.: 4.09 cm  SI(Cube): 22.11 ml/m2  SI(Teich): 22.31  "ml/m2  SV MOD A4C: 40.2 ml  SV(Cube): 39.13 ml  SV(Teich): 39.49 ml    CW  TR Vmax: 2.04 m/s  TR maxP.63 mmHg    MM  TAPSE: 1.99 cm    PW  E': 0.08 m/s  E/E': 7.46  MV A Clarke: 0.64 m/s  MV Dec [place]: 2.58 m/s2  MV DecT: 221.88 ms  MV E Clarke: 0.57 m/s  MV E/A Ratio: 0.89    [facility]    Prepared and electronically signed by    Vanesa Fair DO  Signed 03-Sep-2020 17:07:14    Name: Bubba Calle                       : 1950  MRN: [id number]                       Age: 73 y.o.  Patient Status: Outpatient          Gender: male  Echo stress test, exercise    Height: 5' 8\" (1.727 m)   Weight: 65.8 kg (145 lb)   BSA: 1.78 m²   Blood Pressure: 120/80    Date of Study: 23   Ordering Provider: RYAN Woodward   Clinical Indications: Other, Please Specify in Comments       Reading Physicians  Performing Staff   Cardiology: Christopher Lambert MD    Tech: CATALINA Davison   Support Staff: Shelby Gilliam RN        Height & Weight    Height Weight BSA (Calculated - m2)   5' 8\" (1.727 m) 65.8 kg (145 lb) 1.78 sq meters     PACS Images     Show images for Echo stress test, exercise  Study Details    Study quality was adequate. The apical and parasternal views were obtained.     History    HCM, lightheaded, palps, PVCs, CP, HTN, family history     Interpretation Summary  Show Result Comparison     Left Ventricle: Left ventricular cavity size is normal. Wall thickness is mildly increased. There is mild asymmetric hypertrophy of the basal septal wall. The left ventricular ejection fraction is 65%. Systolic function is normal. Global longitudinal strain is normal at -17%. Wall motion is normal. Diastolic function is mildly abnormal, consistent with grade I (abnormal) relaxation.    Aortic Valve: There is mild regurgitation.    Stress ECG: No ST deviation is noted. There were no arrhythmias during recovery. . The ECG was negative for ischemia. The stress ECG is " negative for ischemia after maximal exercise, without reproduction of symptoms.    Post Stress Echo: Left ventricle cavity has normal reduction in size post-stress. The left ventricle systolic function is hyperdynamic post-stress.    Echo Post Impression: This study shows a low prognostic risk. The study is normal.     Strain was performed to quantify interventricular dyssynchrony and evaluate components of myocardial function due to HCM. Results from the utilization of Strain Analysis are listed in the report below.     Stress Findings    Stress Findings A bicycle protocol stress test was performed. The patient reached stage 6.0 of the protocol after exercising for 10 min and 30 sec and had a maximal HR of 139 bpm (93 % of MPHR) and 8.3 METS. The patient experienced no angina during the test. The patient achieved the target heart rate. The patient reported mild fatigue during the stress test. Symptoms began during stress and ended during recovery. Blood pressure demonstrated a normal response and heart rate demonstrated a normal response to stress.     Findings    Left Ventricle Left ventricular cavity size is normal. Wall thickness is mildly increased. There is mild asymmetric hypertrophy of the basal septal wall. The left ventricular ejection fraction is 65%. Systolic function is normal. Global longitudinal strain is normal at -17%.  Wall motion is normal. Diastolic function is mildly abnormal, consistent with grade I (abnormal) relaxation.   Right Ventricle Right ventricular cavity size is normal. Systolic function is normal. Wall thickness is normal.   Left Atrium The atrium is normal in size.   Right Atrium The atrium is normal in size.   Aortic Valve The aortic valve is trileaflet. The leaflets are mildly thickened. The leaflets are not calcified. The leaflets exhibit normal mobility. There is mild regurgitation. The aortic valve has no significant stenosis.   Mitral Valve Mitral valve structure is normal.  There is trace regurgitation. There is no evidence of stenosis.   Tricuspid Valve Tricuspid valve structure is normal. There is trace regurgitation. There is no evidence of stenosis. The right ventricular systolic pressure is normal.     Stress Echo Findings    Study Impression This study shows a low prognostic risk. The study is normal.     Stress Measurements    Baseline Vitals   Baseline HR 76 bpm         Baseline /80 mmHg         O2 sat rest 98 %         Peak Stress Vitals   Stress peak  bpm         Post peak  mmHg         O2 sat peak 98 %         Max HR Percent 93 %         Max  bpm         Recovery Vitals   Recovery  bpm         Recovery /76 mmHg         O2 sat recovery 98 %          Exercise Data   Max  bpm         Exercise duration (min) 10 min         Exercise duration (sec) 30 sec         Estimated workload 8.3 METS         Stress Stage Reached 6         Angina Index 0         Rate Pressure Product 23,688              Stage Data 9/14/23 12:00 PM--9/14/23 12:45 PM    Date/Time BP HR O2 RPP Bicycle - Martinez Symptoms   09/14/23 1216 120/80 76 bpm 98 % 9120 -- none, lungs cta, slight murmur   09/14/23 1222 150/86 90 bpm 96 % 31193 25 none   09/14/23 1224 150/80 100 bpm 96 % 57673 50 none   09/14/23 1226 162/86 104 bpm -- 51715 75 none   09/14/23 1228 176/90 113 bpm -- 28521 100 none   09/14/23 1230 184/94 134 bpm 98 % 61530 125 none   09/14/23 1231 -- 139 bpm 98 % -- 150 mild fatigue   09/14/23 1232 188/80 139 bpm 98 % 83597 -- mild fatigue   09/14/23 1235 160/80 101 bpm -- 41445 -- subsiding   09/14/23 1237 126/76 100 bpm 98 % 20041 -- none     Left Ventricle Measurements    Strain   GLS -17 %               Aortic Valve Measurements    Regurgitation   AV peak gradient 79 mmHg         AV Deceleration Time 1,990 ms         AV regurgitation pressure 1/2 time 577 ms               Tricuspid Valve Measurements    RVSP Parameters   TR Peak Clarke 2.1 m/s         Triscuspid Valve  "Regurgitation Peak Gradient 17 mmHg               Exam Details    Performed Procedure Technologist Supporting Staff Performing Physician   Echo stress test, exercise Gabo Carmichael, CATALINA Gilliam, RN Christopher Lambert MD         Appointment Date/Status Modality Department    2023     Completed BE STRESS 1 BE CAR NON INV           Begin Exam End Exam Begin Exam Questionnaires End Exam Questionnaires   2023 12:00 PM 2023 12:45 PM CV STRESS QUESTIONNAIRE PATIENT EDUCATION            All Reviewers List    Christopher Lambert MD on 2023  7:13 AM     Signed    Electronically signed by Christopher Lambert MD on 23 at 1428 EDT     Name: Bubba Calle                       : 1950  MRN: [id number]                       Age: 73 y.o.  Patient Status: Outpatient          Gender: male  Echo complete    Height: 5' 8\" (1.727 m)   Weight: 66.2 kg (146 lb)   BSA: 1.79 m²   Blood Pressure: 118/78    Date of Study: 24   Ordering Provider: RYAN Woodward   Clinical Indications: HOCM (hypertrophic obstructive cardiomyopathy) (HCC) [I42.1 (ICD-10-CM)]       Reading Physicians  Performing Staff   Cardiology: Christopher Lambert MD    Tech: Belinda Gresham [facility]        Vitals    Height Weight BSA (Calculated - m2) BP Pulse   5' 8\" (1.727 m) 66.2 kg (146 lb) 1.79 sq meters 118/78 74     PACS Images     Show images for Echo complete w/ contrast if indicated  Study Details    This transthoracic echocardiogram was performed in the echo lab. This was a routine, outpatient study. Study quality was adequate. A complete 2D, color flow Doppler, spectral Doppler, 2D, color flow Doppler and spectral Doppler transthoracic echocardiogram was performed.  The apical, parasternal, subcostal and suprasternal views were obtained.     Interpretation Summary  Show Result Comparison     Left Ventricle: Left ventricular cavity size is normal. Wall thickness is mildly increased. There is mild asymmetric hypertrophy of the " basal septal wall. The left ventricular ejection fraction is 60%. Systolic function is normal. Global longitudinal strain is normal at -18%. Wall motion is normal. Diastolic function is mildly abnormal, consistent with grade I (abnormal) relaxation. There is no LV dynamic obstruction.    Aorta: The aortic root is normal in size. The ascending aorta is mildly dilated. The aortic root is 2.40 cm. The ascending aorta is 4 cm.     Strain was performed to quantify interventricular dyssynchrony and evaluate components of myocardial function due to (positive family history of HCM, family history of sudden death, HCM, Chemotherapy, complex CHD, genetic abnormality, viral infection) . Results from the utilization of Strain Analysis are listed in the report below.     Findings    Left Ventricle Left ventricular cavity size is normal. Wall thickness is mildly increased. There is mild asymmetric hypertrophy of the basal septal wall. The left ventricular ejection fraction is 60%. Systolic function is normal. Global longitudinal strain is normal at -18%.  Wall motion is normal. Diastolic function is mildly abnormal, consistent with grade I (abnormal) relaxation.  There is no LV dynamic obstruction.   Right Ventricle Right ventricular cavity size is normal. Systolic function is normal. Wall thickness is normal.   Left Atrium The atrium is normal in size.   Right Atrium The atrium is normal in size.   Aortic Valve The aortic valve is trileaflet. The leaflets are not thickened. The leaflets are not calcified. The leaflets exhibit normal mobility. There is trace regurgitation. The aortic valve has no significant stenosis.   Mitral Valve Mitral valve structure is normal.  There is trace regurgitation. There is no evidence of stenosis.   Tricuspid Valve Tricuspid valve structure is normal. There is trace regurgitation. There is no evidence of stenosis. The right ventricular systolic pressure is normal.   Pulmonic Valve Pulmonic valve  structure is normal. There is no evidence of regurgitation. There is no evidence of stenosis.   Ascending Aorta The aortic root is normal in size. The ascending aorta is mildly dilated. The aortic root is 2.40 cm. The ascending aorta is 4 cm.   IVC/SVC The inferior vena cava is normal in size.   Pericardium There is no pericardial effusion. The pericardium is normal in appearance.     Left Ventricle Measurements    Function/Volumes   A4C EF 56 %         Left ventricular stroke volume (2D) 27 mL         LV Diastolic Volume (BP) 59 mL         LV Systolic Volume (BP) 27 mL         EF 54 %         Dimensions   LVIDd 3.4 cm         LVIDS 2.4 cm         IVSd 0.8 cm         LVPWd 0.8 cm         FS 29         Diastolic Filling   MV E' Tissue Velocity Septal 10 cm/s         LA Volume Index (BP) 15.1 mL/m2         E/A ratio 0.67         E wave deceleration time 222 ms         MV Peak E Clarke 38 cm/s         MV Peak A Clarke 0.57 m/s         Strain   GLS -18 %               Right Ventricle Measurements    Dimensions   RVID d 4 cm         Tricuspid annular plane systolic excursion 2.3 cm               Left Atrium Measurements    Dimensions   LA size 2.1 cm         LA length (A2C) 4.3 cm         Volumes   LA volume (BP) 27 mL         LA Volume Index (BP) 15.1 mL/m2               Right Atrium Measurements    Dimensions   RAA A4C 14.7 cm2               Mitral Valve Measurements    Stenosis   MV stenosis pressure 1/2 time 64 ms         MV valve area p 1/2 method 3.44               Tricuspid Valve Measurements    RVSP Parameters   TR Peak Clarke 2.1 m/s         Triscuspid Valve Regurgitation Peak Gradient 17 mmHg               Aorta Measurements    Aortic Dimensions   Ao root 2.4 cm         Asc Ao 4 cm               Exam Details    Performed Procedure Technologist Supporting Staff Performing Physician   Echo complete Belinda Khalil           Appointment Date/Status Modality Department    8/6/2024     Arrived BE HV ECHO 1 BE HV  CAR NON INV           Begin Exam End Exam  End Exam Questionnaires   8/6/2024  7:51 AM 8/6/2024  8:34 AM  PATIENT EDUCATION            Signed    Electronically signed by Christopher Lambert MD on 8/6/24 at 0857 EDT     Counseling / Coordination of Care  Total time spent today 35 minutes.  Greater than 50% of total time was spent with the patient and / or family counseling and / or coordination of care.

## 2024-08-06 ENCOUNTER — HOSPITAL ENCOUNTER (OUTPATIENT)
Dept: NON INVASIVE DIAGNOSTICS | Facility: CLINIC | Age: 74
Discharge: HOME | End: 2024-08-06
Payer: COMMERCIAL

## 2024-08-06 ENCOUNTER — OFFICE VISIT (OUTPATIENT)
Dept: CARDIOLOGY CLINIC | Facility: CLINIC | Age: 74
End: 2024-08-06
Payer: COMMERCIAL

## 2024-08-06 VITALS
WEIGHT: 146 LBS | HEIGHT: 68 IN | DIASTOLIC BLOOD PRESSURE: 78 MMHG | BODY MASS INDEX: 22.13 KG/M2 | HEART RATE: 74 BPM | SYSTOLIC BLOOD PRESSURE: 118 MMHG

## 2024-08-06 VITALS
WEIGHT: 145.3 LBS | DIASTOLIC BLOOD PRESSURE: 58 MMHG | BODY MASS INDEX: 22.09 KG/M2 | SYSTOLIC BLOOD PRESSURE: 112 MMHG | OXYGEN SATURATION: 97 % | HEART RATE: 77 BPM

## 2024-08-06 DIAGNOSIS — I10 PRIMARY HYPERTENSION: ICD-10-CM

## 2024-08-06 DIAGNOSIS — R00.2 PALPITATIONS: ICD-10-CM

## 2024-08-06 DIAGNOSIS — I51.7 LEFT VENTRICULAR HYPERTROPHY BY ELECTROCARDIOGRAM: ICD-10-CM

## 2024-08-06 DIAGNOSIS — I42.1 HOCM (HYPERTROPHIC OBSTRUCTIVE CARDIOMYOPATHY) (HCC): ICD-10-CM

## 2024-08-06 DIAGNOSIS — I42.1 HOCM (HYPERTROPHIC OBSTRUCTIVE CARDIOMYOPATHY) (HCC): Primary | ICD-10-CM

## 2024-08-06 LAB
AORTIC ROOT: 2.4 CM
APICAL FOUR CHAMBER EJECTION FRACTION: 56 %
ASCENDING AORTA: 4 CM
BSA FOR ECHO PROCEDURE: 1.79 M2
E WAVE DECELERATION TIME: 222 MS
E/A RATIO: 0.67
FRACTIONAL SHORTENING: 29 (ref 28–44)
GLOBAL LONGITUIDAL STRAIN: -18 %
INTERVENTRICULAR SEPTUM IN DIASTOLE (PARASTERNAL SHORT AXIS VIEW): 0.8 CM
INTERVENTRICULAR SEPTUM: 0.8 CM (ref 0.6–1.1)
LAAS-AP2: 12.9 CM2
LAAS-AP4: 11.9 CM2
LEFT ATRIUM SIZE: 2.1 CM
LEFT ATRIUM VOLUME (MOD BIPLANE): 27 ML
LEFT ATRIUM VOLUME INDEX (MOD BIPLANE): 15.1 ML/M2
LEFT INTERNAL DIMENSION IN SYSTOLE: 2.4 CM (ref 2.1–4)
LEFT VENTRICLE DIASTOLIC VOLUME (MOD BIPLANE): 59 ML
LEFT VENTRICLE DIASTOLIC VOLUME INDEX (MOD BIPLANE): 33 ML/M2
LEFT VENTRICLE SYSTOLIC VOLUME (MOD BIPLANE): 27 ML
LEFT VENTRICLE SYSTOLIC VOLUME INDEX (MOD BIPLANE): 15.1 ML/M2
LEFT VENTRICULAR INTERNAL DIMENSION IN DIASTOLE: 3.4 CM (ref 3.5–6)
LEFT VENTRICULAR POSTERIOR WALL IN END DIASTOLE: 0.8 CM
LEFT VENTRICULAR STROKE VOLUME: 27 ML
LV EF: 54 %
LVSV (TEICH): 27 ML
MV E'TISSUE VEL-SEP: 10 CM/S
MV PEAK A VEL: 0.57 M/S
MV PEAK E VEL: 38 CM/S
MV STENOSIS PRESSURE HALF TIME: 64 MS
MV VALVE AREA P 1/2 METHOD: 3.44
RIGHT ATRIUM AREA SYSTOLE A4C: 14.7 CM2
RIGHT VENTRICLE ID DIMENSION: 4 CM
SL CV LEFT ATRIUM LENGTH A2C: 4.3 CM
SL CV LV EF: 60
SL CV PED ECHO LEFT VENTRICLE DIASTOLIC VOLUME (MOD BIPLANE) 2D: 48 ML
SL CV PED ECHO LEFT VENTRICLE SYSTOLIC VOLUME (MOD BIPLANE) 2D: 20 ML
TR MAX PG: 17 MMHG
TR PEAK VELOCITY: 2.1 M/S
TRICUSPID ANNULAR PLANE SYSTOLIC EXCURSION: 2.3 CM
TRICUSPID VALVE PEAK REGURGITATION VELOCITY: 2.06 M/S

## 2024-08-06 PROCEDURE — 93306 TTE W/DOPPLER COMPLETE: CPT

## 2024-08-06 PROCEDURE — 99214 OFFICE O/P EST MOD 30 MIN: CPT | Performed by: INTERNAL MEDICINE

## 2024-08-06 PROCEDURE — 93306 TTE W/DOPPLER COMPLETE: CPT | Performed by: INTERNAL MEDICINE

## 2024-11-27 ENCOUNTER — TELEPHONE (OUTPATIENT)
Dept: GASTROENTEROLOGY | Facility: CLINIC | Age: 74
End: 2024-11-27

## 2024-11-27 ENCOUNTER — OFFICE VISIT (OUTPATIENT)
Dept: GASTROENTEROLOGY | Facility: CLINIC | Age: 74
End: 2024-11-27
Payer: COMMERCIAL

## 2024-11-27 VITALS
SYSTOLIC BLOOD PRESSURE: 138 MMHG | HEIGHT: 68 IN | DIASTOLIC BLOOD PRESSURE: 76 MMHG | BODY MASS INDEX: 21.67 KG/M2 | WEIGHT: 143 LBS

## 2024-11-27 DIAGNOSIS — Z86.0100 HISTORY OF COLON POLYPS: ICD-10-CM

## 2024-11-27 DIAGNOSIS — R19.4 CHANGE IN BOWEL HABITS: ICD-10-CM

## 2024-11-27 DIAGNOSIS — K21.9 GASTROESOPHAGEAL REFLUX DISEASE, UNSPECIFIED WHETHER ESOPHAGITIS PRESENT: Primary | ICD-10-CM

## 2024-11-27 PROCEDURE — 99214 OFFICE O/P EST MOD 30 MIN: CPT | Performed by: INTERNAL MEDICINE

## 2024-11-27 RX ORDER — SODIUM CHLORIDE, SODIUM LACTATE, POTASSIUM CHLORIDE, CALCIUM CHLORIDE 600; 310; 30; 20 MG/100ML; MG/100ML; MG/100ML; MG/100ML
125 INJECTION, SOLUTION INTRAVENOUS CONTINUOUS
OUTPATIENT
Start: 2024-11-27

## 2024-11-27 NOTE — LETTER
November 27, 2024     Gabriel Wilks MD  [address]  [address]  [facility] [address]    Patient: Bubba Calle   YOB: 1950   Date of Visit: 11/27/2024       Dear Dr. Wilks:    Thank you for referring Bubba Calle to me for evaluation. Below are my notes for this consultation.    If you have questions, please do not hesitate to call me. I look forward to following your patient along with you.         Sincerely,        Tiffanie Willams MD        CC: No Recipients    Tiffanie Willams MD  11/27/2024 12:11 PM  Sign when Signing Visit                                              [facility] - Outpatient Follow-up Note  Bubba Calle 74 y.o. male MRN: [id number]  Encounter: [id number]    ASSESSMENT AND PLAN:      1. Gastroesophageal reflux disease, unspecified whether esophagitis present (Primary)  Patient with intermittent GERD symptoms predominantly manifest as hoarseness, but also some nighttime heartburn and sour brash.  Antacids and elevation of the head of bed help.  Suspect reflux is contributing to his hoarseness.  Recommend assessment for peptic esophagitis, Glover's and peptic ulcer.  Continue reflux diet and precautions  Elevate head of bed at night and avoid nighttime eating  Antacids as needed for now  Hold on acid suppression pending result of endoscopy  Schedule EGD at [facility]  - EGD; Future    2. Change in bowel habits  Clinically stable.  Improved with use of psyllium supplement.  Managing just by diet presently.    Biopsy for celiac at time of endoscopy.    3. History of colon polyps  History of adenomatous polyps.  Up-to-date with surveillance.  Recommend colonoscopy every 5 years.  Next colonoscopy due July 2025      Followup Appointment: Pending result of EGD  ______________________________________________________________________    Chief Complaint   Patient presents with   • GERD     Pt referred by ENT for EGD, hoarse voice and pt has reflux,  "occasional heartburn     HPI:  Stool issues improved or psyllium.  Able to discontinue and still OK.    Now with more hoarseness and saw ENT, suspect from GERD.  No water brash.  Some heartburn and nighttime sour brash.  Elevation of head of bed helps.  No dyspepsia or , nausea or vomiting.  Taking OTC digestive aid that helps.  No dysphagia, though occasional eating things like nuts feels like it may get caught.  Discussed GERD and Glover's.  No acid reducers presently.   Feels discomfort in upper chest, if that's present takes TUMS which helps.      Historical Information  Past Medical History:   Diagnosis Date   • Cancer (HCC)    • Hypertension    • Prostatism    • 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(HCC)      Past Surgical History:   Procedure Laterality Date   • COLONOSCOPY     • SKIN BIOPSY       Social History     Substance and Sexual Activity   Alcohol Use Not Currently    Comment: 1-4 weekly     Social History     Substance and Sexual Activity   Drug Use No     Social History     Tobacco Use   Smoking Status Never   Smokeless Tobacco Never     History reviewed. No pertinent family history.      Current Outpatient Medications:   •  losartan (COZAAR) 25 mg tablet  •  Ascorbic Acid 500 MG CAPS  •  metroNIDAZOLE (FLAGYL) 250 mg tablet  •  multivitamin (THERAGRAN) TABS  •  tetracycline (ACHROMYCIN,SUMYCIN) 500 MG capsule  No Known Allergies  Reviewed medications and allergies and updated as indicated    PHYSICAL EXAM:    Blood pressure 138/76, height 5' 8\" (1.727 m), weight 64.9 kg (143 lb). Body mass index is 21.74 kg/m².  General Appearance: NAD, cooperative, alert  Eyes: Anicteric, conjunctiva pink  ENT:  Normocephalic, atraumatic, normal mucosa.    Neck:  Supple, symmetrical, trachea midline  Resp:  Clear to auscultation bilaterally; no rales, rhonchi or wheezing; respirations unlabored   CV:  S1 S2, Regular rate and rhythm; no murmur, rub, or gallop.  GI:  Soft, non-tender, non-distended; normal bowel " "sounds; no masses, no organomegaly   Rectal: Deferred  Musculoskeletal: No cyanosis, clubbing or edema. Normal ROM.  Skin:  No jaundice, rashes, or lesions   Heme/Lymph: No palpable cervical lymphadenopathy  Psych: Normal affect, good eye contact  Neuro: No gross deficits, AAOx3    Lab Results:   No results found for: \"WBC\", \"HGB\", \"HCT\", \"MCV\", \"PLT\"  Lab Results   Component Value Date    K 4.2 07/20/2020     07/20/2020    CO2 29 07/20/2020    BUN 18 07/20/2020    CREATININE 0.94 07/20/2020    CALCIUM 9.0 07/20/2020    AST 16 07/20/2020    ALT 30 07/20/2020    ALKPHOS 56 07/20/2020    EGFR 84 07/20/2020       Radiology Results:   No results found.    "

## 2024-11-27 NOTE — TELEPHONE ENCOUNTER
Scheduled date of EGD(as of today):  12/17/24  Physician performing EGD: JOANNA  Location of EGD: [facility]  Instructions reviewed with patient by: HENRY  Clearances: N

## 2024-11-27 NOTE — H&P (VIEW-ONLY)
[facility] - Outpatient Follow-up Note  Bubba Calle 74 y.o. male MRN: [id number]  Encounter: [id number]    ASSESSMENT AND PLAN:      1. Gastroesophageal reflux disease, unspecified whether esophagitis present (Primary)  Patient with intermittent GERD symptoms predominantly manifest as hoarseness, but also some nighttime heartburn and sour brash.  Antacids and elevation of the head of bed help.  Suspect reflux is contributing to his hoarseness.  Recommend assessment for peptic esophagitis, Glover's and peptic ulcer.  Continue reflux diet and precautions  Elevate head of bed at night and avoid nighttime eating  Antacids as needed for now  Hold on acid suppression pending result of endoscopy  Schedule EGD at [facility]  - EGD; Future    2. Change in bowel habits  Clinically stable.  Improved with use of psyllium supplement.  Managing just by diet presently.    Biopsy for celiac at time of endoscopy.    3. History of colon polyps  History of adenomatous polyps.  Up-to-date with surveillance.  Recommend colonoscopy every 5 years.  Next colonoscopy due July 2025      Followup Appointment: Pending result of EGD  ______________________________________________________________________    Chief Complaint   Patient presents with    GERD     Pt referred by ENT for EGD, hoarse voice and pt has reflux, occasional heartburn     HPI:  Stool issues improved or psyllium.  Able to discontinue and still OK.    Now with more hoarseness and saw ENT, suspect from GERD.  No water brash.  Some heartburn and nighttime sour brash.  Elevation of head of bed helps.  No dyspepsia or , nausea or vomiting.  Taking OTC digestive aid that helps.  No dysphagia, though occasional eating things like nuts feels like it may get caught.  Discussed GERD and Glover's.  No acid reducers presently.   Feels discomfort in upper chest, if that's present takes TUMS which helps.   "    Historical Information   Past Medical History:   Diagnosis Date    Cancer (HCC)     Hypertension     Prostatism     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(HCC)      Past Surgical History:   Procedure Laterality Date    COLONOSCOPY      SKIN BIOPSY       Social History     Substance and Sexual Activity   Alcohol Use Not Currently    Comment: 1-4 weekly     Social History     Substance and Sexual Activity   Drug Use No     Social History     Tobacco Use   Smoking Status Never   Smokeless Tobacco Never     History reviewed. No pertinent family history.      Current Outpatient Medications:     losartan (COZAAR) 25 mg tablet    Ascorbic Acid 500 MG CAPS    metroNIDAZOLE (FLAGYL) 250 mg tablet    multivitamin (THERAGRAN) TABS    tetracycline (ACHROMYCIN,SUMYCIN) 500 MG capsule  No Known Allergies  Reviewed medications and allergies and updated as indicated    PHYSICAL EXAM:    Blood pressure 138/76, height 5' 8\" (1.727 m), weight 64.9 kg (143 lb). Body mass index is 21.74 kg/m².  General Appearance: NAD, cooperative, alert  Eyes: Anicteric, conjunctiva pink  ENT:  Normocephalic, atraumatic, normal mucosa.    Neck:  Supple, symmetrical, trachea midline  Resp:  Clear to auscultation bilaterally; no rales, rhonchi or wheezing; respirations unlabored   CV:  S1 S2, Regular rate and rhythm; no murmur, rub, or gallop.  GI:  Soft, non-tender, non-distended; normal bowel sounds; no masses, no organomegaly   Rectal: Deferred  Musculoskeletal: No cyanosis, clubbing or edema. Normal ROM.  Skin:  No jaundice, rashes, or lesions   Heme/Lymph: No palpable cervical lymphadenopathy  Psych: Normal affect, good eye contact  Neuro: No gross deficits, AAOx3    Lab Results:   No results found for: \"WBC\", \"HGB\", \"HCT\", \"MCV\", \"PLT\"  Lab Results   Component Value Date    K 4.2 07/20/2020     07/20/2020    CO2 29 07/20/2020    BUN 18 07/20/2020    CREATININE 0.94 07/20/2020    CALCIUM 9.0 07/20/2020    AST 16 07/20/2020    ALT 30 " 07/20/2020    ALKPHOS 56 07/20/2020    EGFR 84 07/20/2020       Radiology Results:   No results found.

## 2024-11-27 NOTE — PATIENT INSTRUCTIONS
Continue reflux diet and precautions.  Elevate head of bed at night.  Antacids as needed.  Schedule EGD at [facility].

## 2024-11-27 NOTE — PROGRESS NOTES
[facility] - Outpatient Follow-up Note  Bubba Calle 74 y.o. male MRN: [id number]  Encounter: [id number]    ASSESSMENT AND PLAN:      1. Gastroesophageal reflux disease, unspecified whether esophagitis present (Primary)  Patient with intermittent GERD symptoms predominantly manifest as hoarseness, but also some nighttime heartburn and sour brash.  Antacids and elevation of the head of bed help.  Suspect reflux is contributing to his hoarseness.  Recommend assessment for peptic esophagitis, Glover's and peptic ulcer.  Continue reflux diet and precautions  Elevate head of bed at night and avoid nighttime eating  Antacids as needed for now  Hold on acid suppression pending result of endoscopy  Schedule EGD at [facility]  - EGD; Future    2. Change in bowel habits  Clinically stable.  Improved with use of psyllium supplement.  Managing just by diet presently.    Biopsy for celiac at time of endoscopy.    3. History of colon polyps  History of adenomatous polyps.  Up-to-date with surveillance.  Recommend colonoscopy every 5 years.  Next colonoscopy due July 2025      Followup Appointment: Pending result of EGD  ______________________________________________________________________    Chief Complaint   Patient presents with    GERD     Pt referred by ENT for EGD, hoarse voice and pt has reflux, occasional heartburn     HPI:  Stool issues improved or psyllium.  Able to discontinue and still OK.    Now with more hoarseness and saw ENT, suspect from GERD.  No water brash.  Some heartburn and nighttime sour brash.  Elevation of head of bed helps.  No dyspepsia or , nausea or vomiting.  Taking OTC digestive aid that helps.  No dysphagia, though occasional eating things like nuts feels like it may get caught.  Discussed GERD and Glover's.  No acid reducers presently.   Feels discomfort in upper chest, if that's present takes TUMS which helps.   "    Historical Information   Past Medical History:   Diagnosis Date    Cancer (HCC)     Hypertension     Prostatism     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(HCC)      Past Surgical History:   Procedure Laterality Date    COLONOSCOPY      SKIN BIOPSY       Social History     Substance and Sexual Activity   Alcohol Use Not Currently    Comment: 1-4 weekly     Social History     Substance and Sexual Activity   Drug Use No     Social History     Tobacco Use   Smoking Status Never   Smokeless Tobacco Never     History reviewed. No pertinent family history.      Current Outpatient Medications:     losartan (COZAAR) 25 mg tablet    Ascorbic Acid 500 MG CAPS    metroNIDAZOLE (FLAGYL) 250 mg tablet    multivitamin (THERAGRAN) TABS    tetracycline (ACHROMYCIN,SUMYCIN) 500 MG capsule  No Known Allergies  Reviewed medications and allergies and updated as indicated    PHYSICAL EXAM:    Blood pressure 138/76, height 5' 8\" (1.727 m), weight 64.9 kg (143 lb). Body mass index is 21.74 kg/m².  General Appearance: NAD, cooperative, alert  Eyes: Anicteric, conjunctiva pink  ENT:  Normocephalic, atraumatic, normal mucosa.    Neck:  Supple, symmetrical, trachea midline  Resp:  Clear to auscultation bilaterally; no rales, rhonchi or wheezing; respirations unlabored   CV:  S1 S2, Regular rate and rhythm; no murmur, rub, or gallop.  GI:  Soft, non-tender, non-distended; normal bowel sounds; no masses, no organomegaly   Rectal: Deferred  Musculoskeletal: No cyanosis, clubbing or edema. Normal ROM.  Skin:  No jaundice, rashes, or lesions   Heme/Lymph: No palpable cervical lymphadenopathy  Psych: Normal affect, good eye contact  Neuro: No gross deficits, AAOx3    Lab Results:   No results found for: \"WBC\", \"HGB\", \"HCT\", \"MCV\", \"PLT\"  Lab Results   Component Value Date    K 4.2 07/20/2020     07/20/2020    CO2 29 07/20/2020    BUN 18 07/20/2020    CREATININE 0.94 07/20/2020    CALCIUM 9.0 07/20/2020    AST 16 07/20/2020    ALT 30 " 07/20/2020    ALKPHOS 56 07/20/2020    EGFR 84 07/20/2020       Radiology Results:   No results found.

## 2024-12-02 ENCOUNTER — TELEPHONE (OUTPATIENT)
Dept: GASTROENTEROLOGY | Facility: CLINIC | Age: 74
End: 2024-12-02

## 2024-12-03 ENCOUNTER — ANESTHESIA EVENT (OUTPATIENT)
Dept: ANESTHESIOLOGY | Facility: AMBULATORY SURGERY CENTER | Age: 74
End: 2024-12-03

## 2024-12-03 ENCOUNTER — ANESTHESIA (OUTPATIENT)
Dept: ANESTHESIOLOGY | Facility: AMBULATORY SURGERY CENTER | Age: 74
End: 2024-12-03

## 2024-12-17 ENCOUNTER — HOSPITAL ENCOUNTER (OUTPATIENT)
Dept: GASTROENTEROLOGY | Facility: AMBULATORY SURGERY CENTER | Age: 74
Discharge: HOME | End: 2024-12-17
Payer: COMMERCIAL

## 2024-12-17 ENCOUNTER — ANESTHESIA (OUTPATIENT)
Dept: GASTROENTEROLOGY | Facility: AMBULATORY SURGERY CENTER | Age: 74
End: 2024-12-17

## 2024-12-17 ENCOUNTER — ANESTHESIA EVENT (OUTPATIENT)
Dept: GASTROENTEROLOGY | Facility: AMBULATORY SURGERY CENTER | Age: 74
End: 2024-12-17

## 2024-12-17 VITALS
BODY MASS INDEX: 21.67 KG/M2 | OXYGEN SATURATION: 98 % | SYSTOLIC BLOOD PRESSURE: 122 MMHG | TEMPERATURE: 97.8 F | RESPIRATION RATE: 20 BRPM | HEIGHT: 68 IN | WEIGHT: 143 LBS | DIASTOLIC BLOOD PRESSURE: 75 MMHG | HEART RATE: 75 BPM

## 2024-12-17 DIAGNOSIS — K21.9 GASTROESOPHAGEAL REFLUX DISEASE, UNSPECIFIED WHETHER ESOPHAGITIS PRESENT: ICD-10-CM

## 2024-12-17 PROCEDURE — 43239 EGD BIOPSY SINGLE/MULTIPLE: CPT | Performed by: INTERNAL MEDICINE

## 2024-12-17 PROCEDURE — 88305 TISSUE EXAM BY PATHOLOGIST: CPT | Performed by: PATHOLOGY

## 2024-12-17 RX ORDER — SODIUM CHLORIDE, SODIUM LACTATE, POTASSIUM CHLORIDE, CALCIUM CHLORIDE 600; 310; 30; 20 MG/100ML; MG/100ML; MG/100ML; MG/100ML
INJECTION, SOLUTION INTRAVENOUS CONTINUOUS PRN
Status: DISCONTINUED | OUTPATIENT
Start: 2024-12-17 | End: 2024-12-17

## 2024-12-17 RX ORDER — PROPOFOL 10 MG/ML
INJECTION, EMULSION INTRAVENOUS AS NEEDED
Status: DISCONTINUED | OUTPATIENT
Start: 2024-12-17 | End: 2024-12-17

## 2024-12-17 RX ORDER — SODIUM CHLORIDE, SODIUM LACTATE, POTASSIUM CHLORIDE, CALCIUM CHLORIDE 600; 310; 30; 20 MG/100ML; MG/100ML; MG/100ML; MG/100ML
125 INJECTION, SOLUTION INTRAVENOUS CONTINUOUS
Status: DISCONTINUED | OUTPATIENT
Start: 2024-12-17 | End: 2024-12-21 | Stop reason: HOSPADM

## 2024-12-17 RX ADMIN — PROPOFOL 50 MG: 10 INJECTION, EMULSION INTRAVENOUS at 14:16

## 2024-12-17 RX ADMIN — PROPOFOL 150 MG: 10 INJECTION, EMULSION INTRAVENOUS at 14:14

## 2024-12-17 RX ADMIN — PROPOFOL 50 MG: 10 INJECTION, EMULSION INTRAVENOUS at 14:18

## 2024-12-17 RX ADMIN — SODIUM CHLORIDE, SODIUM LACTATE, POTASSIUM CHLORIDE, CALCIUM CHLORIDE: 600; 310; 30; 20 INJECTION, SOLUTION INTRAVENOUS at 14:11

## 2024-12-17 RX ADMIN — SODIUM CHLORIDE, SODIUM LACTATE, POTASSIUM CHLORIDE, CALCIUM CHLORIDE 125 ML/HR: 600; 310; 30; 20 INJECTION, SOLUTION INTRAVENOUS at 13:23

## 2024-12-17 NOTE — ANESTHESIA POSTPROCEDURE EVALUATION
Post-Op Assessment Note    CV Status:  Stable  Pain Score: 0    Pain management: adequate       Mental Status:  Alert and awake   Hydration Status:  Euvolemic and stable   PONV Controlled:  Controlled   Airway Patency:  Patent and adequate     Post Op Vitals Reviewed: Yes    No anethesia notable event occurred.    Staff: CRNA           Last Filed PACU Vitals:  Vitals Value Taken Time   Temp     Pulse     BP     Resp     SpO2         Modified Marcella:  Activity: 2 (12/17/2024  1:17 PM)  Respiration: 2 (12/17/2024  1:17 PM)  Circulation: 2 (12/17/2024  1:17 PM)  Consciousness: 2 (12/17/2024  1:17 PM)  Oxygen Saturation: 2 (12/17/2024  1:17 PM)  Modified Marcella Score: 10 (12/17/2024  1:17 PM)

## 2024-12-17 NOTE — ANESTHESIA PREPROCEDURE EVALUATION
Procedure:  EGD    Relevant Problems   ANESTHESIA (within normal limits)      CARDIO   (+) Hypertension      Cardiovascular/Peripheral Vascular   (+) HOCM (hypertrophic obstructive cardiomyopathy) (HCC)        Physical Exam    Airway    Mallampati score: I  TM Distance: >3 FB  Neck ROM: full     Dental   No notable dental hx     Cardiovascular  Cardiovascular exam normal    Pulmonary  Pulmonary exam normal     Other Findings        Anesthesia Plan  ASA Score- 3     Anesthesia Type- IV sedation with anesthesia with ASA Monitors.         Additional Monitors:     Airway Plan:     Comment: I discussed risks (reviewed with patient on the anesthesia consent form), benefits and alternatives of monitored sedation including the possibility under sedation to have recall or mild discomfort.  .       Plan Factors-    Chart reviewed.    Patient summary reviewed.                  Induction- intravenous.    Postoperative Plan-         Informed Consent- Anesthetic plan and risks discussed with patient.  I personally reviewed this patient with the CRNA. Discussed and agreed on the Anesthesia Plan with the CRNA..

## 2024-12-20 PROCEDURE — 88305 TISSUE EXAM BY PATHOLOGIST: CPT | Performed by: PATHOLOGY

## 2024-12-21 ENCOUNTER — RESULTS FOLLOW-UP (OUTPATIENT)
Dept: GASTROENTEROLOGY | Facility: CLINIC | Age: 74
End: 2024-12-21

## 2024-12-21 NOTE — RESULT ENCOUNTER NOTE
Biopsy results reviewed and message sent in my chart - no h.pylori - fundic gland polyp - no recall- copy Dr Wilks

## 2025-02-10 NOTE — PROGRESS NOTES
[facility] Follow-up Visit - Cardiology   Bubba Calle 74 y.o. 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 He has presumed mild phenotypic expression of genotype unknown non-obstructive HCM although he has also had hypertension and is being treated with losartan. A 75 year old sister has been diagnosed as having HCM. She lives in [place] and is currently stable. There is no other family member with cardiomyopathy including his 2 children. Mr Calle has been treated surgically for right testicular seminoma in the past and is considered to have been cured. His hypertension has been effectively controlled by low salt diet and small dose of losartan. His only active medical problem is periodic hoarseness of his voice that is suspected to have been caused by gastroesophageal reflux disease. Recent upper GI endoscopy has shown inflammatory antral gastritis and a benign polyp. He is asymptomatic from cardiovascular point of view except for occasional feeling of skipped beats that commonly occur at bedtime. He does not consume caffeine or alcohol. Blood work from 9- was reviewed: CBC, BUN, creatinine, eGFR and K were normal. Total cholesterol and LDL are mildly elevated (204 and 117 mg/dL). He does not wish to take statins. His calculated ASCVD score is 19.2% (10 year risk - moderate). Previous CT of chest has demonstrated mild calcific atherosclerosis of the thoracic aorta and coronary  arteries. He also has mildly dilated ascending aorta (4 cm) that has been stable over years. He denies chest pain and dyspnea with exertion; continues to note palpitations mostly in the evening/night, no LE edema/orthopnea; no dizziness/lightheadedness on a regular basis. He is active and exercises regularly. Weight has been stable and he follows a heart healthy, low sodium diet. An ECG done in the office today is essentially normal:        Physician Requesting Consult: Primary Care  Reason for Consult / Principal Problem: Suspected hypertrophic cardiomyopathy     HPI: Bubba Calle is a 72 year old retired  who presented with complaints of palpitation and chest discomfort. The symptoms began in 2014 and triggered work up that included pharmacologic myocardial perfusion imaging (2014) and echocardiogram (2014) that were reportedly negative and a Holter monitor (2014) that showed premature atrial and ventricular premature beats that did not correlate with the timing of his symptoms (2014). He continued to be symptomatic and was again evaluated in 2016 and 2018. Most recently, he had a 2-week ambulatory ECG monitor that also reportedly showed no high grade arrhythmias or significant pauses. He is healthy, physically fit with a BMI of 22.5 kg/m2, plays golf for 2 days and works part time for 3 days a week. His past medical history is significant for testicular cancer (seminoma) s/p right orchidectomy and recent diagnosis of prostate enlargement on a CT scan taken in follow up evaluation of his cancer. He does have symptoms of prostatism and in particular wakes up more than once at night to urinate. Mr Calle is quite concerned about his symptoms of palpitation, chest discomfort and occasional dizziness particularly after the hospitalization of her sister. He completed an echocardiogram on 10- that showed normal LV size and systolic function with an ejection fraction of 60% and no regional wall  motion abnormalities; no evidence of LVH, no significant valvular abnormality and borderline increase in the size of the aortic root (3.7 cm). He also had a treadmill exercise performed that showed excellent exercise tolerance (duration of exercise was 13 min and maximal work rate was 15.3 METs), reached a peak heart rate of 157 bpm (101 % of maximal predicted heart rate). He was, however, hypertensive at baseline (162/80) with appropriate blood pressure response to stress. There was no chest pain during stress, the stress ECG was normal and no significant arrhythmias were noted (isolated premature ventricular beats). His blood pressure was high during echocardiography as well. He does not add salt to his diet although he does eat out often, has maintained an ideal body weight and is quite active. He does have strong family history of hypertension. His lipid profile is being checked by his family physician. He was started on bisoprolol to treat high blood pressure and help with the sensation of skipped heart beats that was his main concern at that point.      On 1/29/2019: Mr Calle has done extremely well since last office visit. He has brought with him home blood pressure readings that range from 115 to 130 mmHg systolic and 70 to 80 mmHg diastolic. He denies chest pain, shortness of breath and dizziness and feels his palpitation has improved significantly. He exercises regularly and remains active, follows a heart healthy diet low in sodium and is compliant with his medications. I reviewed the medical records of his sister, Lyubov Watts, who has been evaluated at the [facility] in [place] and appears to me that she has a similar problem likely caused by systemic hypertension. In her evaluation, much emphasis has been given to the reportedly sudden death of their father at age 42 and to Mr Calle's diagnosis of HCM. However, the circumstances of father's death is unclear and the  contribution of excess alcohol intake may have been significant. Mr Calle's mild concentric left ventricular hypertrophy is also well explained by his systemic hypertension. Genetic testing at this point does not seem to be helpful considering the otherwise benign family history and mild phenotypic expression. His risk stratification is complete and he would not require further testing at this point.     On 5/21/2019: Mr Calle has not tolerated the bisoprolol that was started for both hypertension and palpitation related to premature ventricular contractions. The medication was effective for both but resulted in insomnia, anxiety, chest pain, cold feet and hands, joint pain and most importantly problems getting and maintaining an erection. Even cutting the dose from 5 to 2.5 mg per day did not resolve the side effects and he stopped the medication altogether and noted rebound hypertension (140/90) and increased heart rate (resting heart rate of 90-95 bpm). He was then started on losartan that has improved blood pressure control but has not done much for the rapid heart rate and palpitation. Today I have switched him from losartan to diltiazem hoping to be able to control both hypertension and palpitation. He will check his blood pressure on a daily basis and send me the results electronically. He has had a CT of chest with contrast on 3- outside the network that has reported mild dilation of the ascending aorta (4.1 cm). He is scheduled to have a repeat study done next March. I have asked him to also let me know hoe diltiazem is working for his palpitation. If tolerated, it will be switched to once daily formulation of diltiazem.     On 11/26/2019: Mr Burrows has not been tolerating the diltiazem and has stopped taking it. He particularly had noticed problems with erectile dysfunction. His home blood pressure readings show values as high as 159/96 mmHg. Today in the clinic his blood pressure is 136/84 mmHg.  He denies headaches but has noted that his palpitation has been quite worse after stopping the diltiazem. His cancer is under control and has not shown recurrence after surgery and his prostate problem is stable. He remains active and works part time. I have advised Mr Calle to continue medical therapy for his systemic hypertension and particularly do not stop the medication on his own. He was started on doxazocin today at a very small dose hoping that it would also help with his prostatism symptoms. I have also ordered an extended ambulatory monitor for him to evaluate his frequent and disabling symptom of palpitation. He will be emailing his blood pressure readings and let me know how he feels on the new medication in about a week. He has had blood work done 2 months ago that I do not have access to and he will be sending those to me as well.     3-3-2020: Mr Burrows has been managing his hypertension with diet and exercise and has not taken any of his antihypertensive medications. He continues to complain of insomnia and nocturia due to benign prostate enlargement but chooses not to take the prescribed doxazocin. He is scheduled to have chest and abdomen CT to evaluate his ascending aortic aneurysm (41 mm) and the seminoma post surgical right orchidectomy. He will also see his oncologist after imaging studies are done. He will also have blood work including lipid profile. Ambulatory ECG monitor performed in December showed few isolated atrial and ventricular premature beats that did not correlate with the sensation of skipped beats. 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 He also plays golf on Mondays and Wednesdays. He is following a heart healthy diet and has kept a normal body mass index.     3-: Mr Calle was seen last 3-3-2020. He was then evaluated by his oncologist and had CT scans on 3- and 7- for follow up of his seminoma  and right external iliac node. Blood work done on 7- showed normal normal WBC (5.6k), HGB (15.6), HCT (45.2), PLT (213K), CREATININE (0.94), BUN (18), electrolytes and liver enzymes. The summary of the CT scan performed on 7-: Stable right external iliac lymph node; no evidence of metastatic disease in the chest, abdomen or pelvis; no acute or suspicious process in the chest, abdomen or pelvis. On September 3-2020 he had a transthoracic echocardiogram that showed Normal LV size and systolic function with an ejection fraction of 60%, grade 1 diastolic dysfunction, borderline atrial sizes and no significant valvular abnormality. An ECG done today is normal except for left atrial abnormality. He is complaining of very uncomfortable feelings with skipped beats and palpitation on a daily basis. He has also noted that his blood pressure has been elevated at home (systolic values between 130 and 140 mmHg. He is however no taking the bisoprolol due to side effects. Today, his blood pressure is 140/88 mmHg in the office. I have asked him to do a 48 hour ambulatory ECG monitor and a 24-hour blood pressure monitor to objectively assess his palpitation and hypertension. He has not yet decided to have vaccination for COVID.     9-: Mr. Calle had  26 hour of ambulatory heart monitoring done as a part of evaluation for palpitation, which showed average heart rate of 85 bpm, 23 PVCs (0% of total beats ), 681 PACs (0.5% of total beats), patient's symptoms of chest pain coincided with sinus rhythm with only 1 episode of skipped beat sensation coinciding withPAC, 35 seconds of tachycardia which did not perceived symptomatic by patient. He continues having palpitations, mainly at night, around 2-3 a.m. Although did not notice association with food intake might have some association with the alcohol intake and his working on dietary modifications.  We recommended to try over-the-counter H2-antagonist such as  Famotidine as undiagnosed GERD could be possible etiology of his symptoms.  Will also discussed dietary changes the could potentially prevent exacerbation of GERD.  Mr. Calle had 24 hour ambulatory blood pressure monitoring (03/31/2021 to 04/01/2021), which showed daytime average blood pressure of 143/ 90 mmHg, nighttime average blood pressure of 126/76 mmHg, with average of 137/86 mmHg. He was started on losartan 50 mg daily which he decreased to 25 mg daily due to episodes of lightheadedness. Mr. Calle brought today results of the most recent blood work such as CMP, CBC, Urinalysis, lipid panel (LDL 89 mg/dL, total cholesterol 179 mg/dL, triglycerides 57 mg/dL, HDL 79 mg/dL), uric acid with all being within normal limits. The most recent PSA was slighlty elevated at 4.1 ng/mL (previously 2.2 ng/mL) and he will have PSA levels rechecked in few month. At this time he sees oncologist only as needed as the most recent survailance imaging did not show new changes. We will order lactate dehydrogenase, AFP and hCG levels to be checked with the plan to follow up with his oncologist if any abnormalities are noted. Will also check TSH levels given history of palpitations. He will be seen in HCM clinic in 6 month.      5-: Mr. Calle was seen and examined with cardiology fellow, Dr. Lindsey. He is asymptomatic, continues being active. He was seen by urologist in 4/2022 at [facility] to follow up on abnormal PSA level of 4.1 ng/ml from 8/2021, which subsequently improved to 2.4 ng/ml in 12/2022. He was recommended to have PSA rechecked in 12 month along with CT scan. Patient declined the latter one as was trying to limit his radiation exposure. He continues taking losartan 25 mg qd with blood pressure being at goal. Home SBP readings are around 110-120 mmHg. We encouraged him to continue staying active. Will check TTE prior to the next visit to follow up on aortic valve insufficiency as well as ascending thoracic  aortic dilatation (3.9 x 4.2 cm by CT in 7/2020). Mr. Calle will be seen in HCM clinic in 6 month.      2-: Since his last office visit in May 2022 patient tells me that overall he feels well from a cardiac standpoint.  He does continue to note palpitations as he did during the past several office visits.  He states that these palpitations occur most frequently in the early morning hours between 3 and 5 AM.  He states he is unsure if these palpitations wake him up or if he just happens to be awake at that time and he is noticing them.  He will occasionally notice some as well if he gets up to urinate in the middle of the night.  Overall, the patient states he does not notice many palpitations during the day.  He denies any associated chest discomfort, shortness of breath or lightheadedness when these palpitations occur.  During our last office visit had been discussed that his increased palpitations in the early morning may be related to acid reflux/GERD and it was recommended that he trial over-the-counter H2 antagonist such as famotidine but the patient states he has not trialed this medication as he prefers to not take medicines.  He does state that he agrees undiagnosed acid reflux/GERD may be underlying etiology to these palpitations as he notes that if he eats more offensive foods he will have increased palpitations the next day.  He also states he has trialed Tums before bed on days that he knows he has eaten more offensive foods and he often notes the palpitations are lessened.  I did again review the pharmacodynamics of H2 blockers and how this may help him but he again declines to take this on a daily basis.  The patient does state that he has now completely eliminated all caffeine and alcohol his diet over the past several weeks and he notes this has helped to lessen his palpitations as well.  With regards to exercise the patient does resistance training with resistance bands roughly 30 minutes a  day almost every day of the week with no complaints of chest discomfort or shortness of breath and this training does not precipitate his palpitations.  His most recent 48-hour Holter monitor from March 2021 revealed PACs and PVCs but always correlating with his symptoms.  I did review with the patient that if these symptoms become more prominent we can retrial beta-blockers.  He denies any syncope or syncope also denies any lower extremity edema or orthopnea.  Occasionally the patient will note some lightheadedness when he is exercising but he states that he has tracked this back to days where he was not as hydrated as he should be.  He notes this lightheadedness does not occur every time he exercises and he typically does not feel lightheaded at any other time during the day.  We did review that his blood pressure overall is controlled.  Upon initial check on arrival to the office visit his blood pressure was 136/87 on my recheck at the end of office visit was 122/78.  The patient states that he does check his blood pressure at home frequently and his systolic blood pressures typically in the mid 110's. He follows a low salt diet.  Patient attempts to follow a low-fat/low-cholesterol diet as well.  He states that he did have labs mid 2022 for his PCP and we will contact his PCPs office for these results.  The patient states that he no longer following with urology telling me that his PCP is capable of following his PSA and he did not want any further radiologic screening.  We did review his prior diagnosis of ascending aortic aneurysm last checked in July 2020 at [facility] CT of chest/abdomen/pelvis: is mild fusiform dilatation of the ascending thoracic aorta measuring 3.9 x 4.2 cm previously measuring 4.0 cm in maximal dimension.  Did offer repeat CT scan as it has been several years since his last imaging and he declines updated imaging at this time.Since his last office visit on 5- the patient had updated  "echocardiogram performed with the following results: Left Ventricle: Left ventricular cavity size is normal. Wall thickness is mildly increased. There is mild asymmetric hypertrophy of the basal septal wall. The left ventricular ejection fraction is 62%. Systolic function is normal. Global longitudinal strain is reduced at -15%. Wall motion is normal. Diastolic function is mildly abnormal, consistent with grade I (abnormal) relaxation. There is no LV dynamic obstruction. No major valvular abnormalities noted. Aortic root normal in size.     8-8-2023: Since his last office visit on 2- he was seen by Dermatology for a lesion on central frontal scalp. This was biopsied and determined to be precancerous and was excised. He was also seen by GI for changes in bowel habits and celiac panel as well as inflammatory markers were negative. His bowel changes improved with addition of Metamucil. He does note some acid reflux type symptoms mostly at night that he feels is associated with specific foods. As it had been discussed with him in the past, we did again reiterate that his nightly symptoms of chest pain/palpitations may indeed be related to GERD and we have again encouraged him to reduce consumption of potentially provocative foods and consider use of an H2 blocker vs. PPI. Since his last visit, he says he has been having several cardiac complaints. He continues to note palpitations describing the feeling as \"skipping beats\". He notes this is becoming more frequent and happening daily but he notes it mostly intensely at night. He did just complete wearing a two week ZIO monitor that he mailed out over this weekend, we will have results in a few days. His TSH was WNL in June of this year. He also notes he is feeling more frequent lightheadedness as well. He claims he is well hydrated through the day. He is having chest discomfort as well but this is not brought on by exertion as he does exercise almost daily and " exercising does not produce symptoms. His blood pressure readings have been acceptable at home on losartan 25 mg daily. He has not been following up with Urology or Oncology anymore as he does not feel follow ups are necessary. We have encouraged him to continue exercise as well as following a heart healthy, low fat/cholesterol/sodium diet. For his symptoms, we will await the results recent ZIO monitor and we will also order exercise bike stress echo to evaluate intracavitary or LV outflow tract obstruction as the cause of his symptoms.       2-6-2024: Mr Calle was last seen in the [facility]CM Clinic on 8-8-2023 at which time he did complain of continuing palpitation particularly at night. The results of a 2 week ambulatory heart monitor was received on 8- and showed:     Patient had a min HR of 55 bpm, max HR of 135 bpm, and avg HR of 83 bpm. Predominant underlying rhythm was Sinus Rhythm. Slight P wave morphology changes were noted. 1 run of Supraventricular Tachycardia occurred lasting 13 beats with a max rate of 135 bpm (avg 106 bpm). Some episodes of Supraventricular Tachycardia may be possible Atrial Tachycardia with variable block. Isolated SVEs were occasional (1.7%, 57272), SVE Couplets were rare (<1.0%, 33), and no SVE Triplets were present. Isolated VEs were rare (<1.0%, 480), VE Triplets were rare (<1.0%, 1), and no VE Couplets were present.     He also underwent stress echocardiography for continued symptoms on 9- that revealed:     A bicycle protocol stress test was performed. The patient reached stage 6.0 of the protocol after exercising for 10 min and 30 sec and had a maximal HR of 139 bpm (93 % of MPHR) and 8.3 METS. The patient experienced no angina during the test. The patient achieved the target heart rate. The patient reported mild fatigue during the stress test. Symptoms began during stress and ended during recovery. Blood pressure demonstrated a normal response and heart rate  "demonstrated a normal response to stress.        Left Ventricle: Left ventricular cavity size is normal. Wall thickness is mildly increased. There is mild asymmetric hypertrophy of the basal septal wall. The left ventricular ejection fraction is 65%. Systolic function is normal. Global longitudinal strain is normal at -17%. Wall motion is normal. Diastolic function is mildly abnormal, consistent with grade I (abnormal) relaxation.    Aortic Valve: There is mild regurgitation.    Stress ECG: No ST deviation is noted. There were no arrhythmias during recovery. . The ECG was negative for ischemia. The stress ECG is negative for ischemia after maximal exercise, without reproduction of symptoms.    Post Stress Echo: Left ventricle cavity has normal reduction in size post-stress. The left ventricle systolic function is hyperdynamic post-stress.    Echo Post Impression: This study shows a low prognostic risk. The study is normal.     Today, he continues to feel palpitations that he describes as a \"skipping\" occurring daily, mostly in the evening/night. We did review his extended monitor which overall was benign and most of his triggered events occurred with a normal rhythm. He states he feels his wrist at the time and notes \"my heart is skipping, I can feel it when I take my pulse\". His EKG today shows NSR with no ectopy.           He does perform regular exercise several days a week doing cardiovascular exercise as well as resistance bands and stretching. He can perform these activities with no cardiac complaints and exercise does not precipitate palpitations. When he feels his heart \"skipping\" he will have some chest tightness as well as shortness of breath and lightheadedness.  We did discuss that his GERD may be causing an issue as well and may be contributing to his symptoms. We have recommended that he trial pantoprazole 40 mg once daily for a few weeks to see if this will help his symptoms. His blood pressure is " acceptable today on losartan 25 mg daily. A low sodium diet was reinforced.     8-6-2024: Since his last HCM Clinic visit of 2-6-2024, Mr Calle has done reasonably well and has continued to stay active with daily aerobic and resistance exercises without significant limitation. His palpitations have improved except for occasional skipped beats and he has had no chest discomfort. He was diagnosed with H,Pylori infection and was treated for it with a course of antibiotics. Repeat testing is pending. His blood pressure is well controlled of 25 mg of losartan daily and occasional blood pressure checks at home have also shown good results. He is due for laboratory tests that will be done next month. Today, physical examination and cardiac auscultation show no abnormal result and a transthoracic echocardiogram also shows no change compared to prior study:       Left Ventricle: Left ventricular cavity size is normal. Wall thickness is mildly increased. There is mild asymmetric hypertrophy of the basal septal wall. The left ventricular ejection fraction is 60%. Systolic function is normal. Global longitudinal strain is normal at -18%. Wall motion is normal. Diastolic function is mildly abnormal, consistent with grade I (abnormal) relaxation. There is no LV dynamic obstruction.    Aorta: The aortic root is normal in size. The ascending aorta is mildly dilated. The aortic root is 2.40 cm. The ascending aorta is 4 cm.     Risk stratification:  Nonsustained ventricular tachycardia-no         Severe left ventricular hypertrophy-no  Family history of sudden death-no  Unexplained syncope-no  LVOT obstruction-no  Atrial fibrillation and left atrial dilation-no  Age-74 years old  NYHA-class I-II  Myocardial fibrosis-no cardiac MRI completed  LV systolic dysfunction-no  Apical aneurysm-no     Review of systems: Denies chest pain and dyspnea with exertion; continues to note palpitations mostly in the evening/night, no LE  "edema/orthopnea; no dizziness/lightheadedness on a regular basis     Family history: Father  at age 42 suddenly for uncertain reason although he had drinking problem, mother  at age 84 () of stroke, she had dementia and bipolar disorder, a 75 year old sister has had diabetes and has been diagnosed with \"hypertrophic cardiomyopathy\" after being taken to a hospital in [place] with chest pain and shortness of breath while running, a brother committed suicide by shooting himself at age 59 (). He also had problem with excess alcohol intake. Mr Calle has 2 children, a 47 year old son with diabetes (on insulin pump) has never been screened to the patient's [employer] and a 42 year old daughter without known health problem also has not been screened. He also has 5 grandchildren, 2 from his son [boy (20) and a girl (15)] and 3 from his daughter (ages 10, 7 and and 5). The grandchildren have had no heart problems and his daughter has gone through pregnancies without any issue.      Genetic testing:  Deferred given benign family history    Historical Information   Past Medical History:   Diagnosis Date    Cancer (HCC)     Hypertension     Prostatism     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(HCC)      Past Surgical History:   Procedure Laterality Date    COLONOSCOPY      SKIN BIOPSY      UPPER GASTROINTESTINAL ENDOSCOPY       Family History   Problem Relation Age of Onset    Colon polyps Neg Hx     Cancer Neg Hx     Colon cancer Neg Hx      Current Outpatient Medications on File Prior to Visit   Medication Sig Dispense Refill    Ascorbic Acid 500 MG CAPS Take 1,000 mg by mouth 2 (two) times a day   (Patient not taking: Reported on 2024)      losartan (COZAAR) 25 mg tablet Take 1 tablet (25 mg total) by mouth in the morning. 90 tablet 3    metroNIDAZOLE (FLAGYL) 250 mg tablet  (Patient not taking: Reported on 2024)      multivitamin (THERAGRAN) TABS Take 1 tablet by mouth daily  (Patient not taking: " "Reported on 12/17/2024)      tetracycline (ACHROMYCIN,SUMYCIN) 500 MG capsule  (Patient not taking: Reported on 5/16/2024)       No current facility-administered medications on file prior to visit.     No Known Allergies  Social History     Substance and Sexual Activity   Alcohol Use Not Currently    Comment: 1-4 weekly     Social History     Substance and Sexual Activity   Drug Use No     Social History     Tobacco Use   Smoking Status Never   Smokeless Tobacco Never     Objective   Vitals:   Vitals:    02/11/25 0918   BP: 124/68   BP Location: Right arm   Patient Position: Sitting   Cuff Size: Standard   Pulse: 80   SpO2: 99%   Weight: 67.7 kg (149 lb 3.2 oz)   Body surface area is 1.8 meters squared.  Body mass index is 22.69 kg/m².    Invasive Devices       None                 Physical Exam:  GEN: Bubba Calle appears well, alert and oriented x 3, pleasant and cooperative   HEENT: pupils equal, round, and reactive to light; extraocular muscles intact  NECK: supple, no carotid bruits   HEART: regular rhythm, normal S1 and S2, no murmurs, clicks, gallops or rubs   LUNGS: clear to auscultation bilaterally; no wheezes, rales, or rhonchi   ABDOMEN: normal bowel sounds, soft, no tenderness, no distention  EXTREMITIES: peripheral pulses normal; no clubbing, cyanosis, or edema  NEURO: no focal findings   SKIN: normal without suspicious lesions on exposed skin    Lab Results:   No results found for: \"WBC\", \"RBC\", \"HGB\", \"HCT\", \"MCV\", \"PLT\", \"RDW\"  Lab Results   Component Value Date    K 4.2 07/20/2020     07/20/2020    CO2 29 07/20/2020    BUN 18 07/20/2020    CREATININE 0.94 07/20/2020    EGFR 84 07/20/2020    CALCIUM 9.0 07/20/2020    AST 16 07/20/2020    ALT 30 07/20/2020    ALKPHOS 56 07/20/2020     Lab Results   Component Value Date    MG 2.2 10/24/2018     No results found for: \"CHOL\", \"HDL\", \"TRIG\", \"LDLCALC\"  No results found for: \"HYK6BHEHOQVZ\", \"T3FREE\", \"FREET4\", \"B8KXDVO\", \"N6RSNBT\"    Imaging:   I " have personally reviewed pertinent films in PACS    Cardiac testing:   Results for orders placed during the hospital encounter of 20    Echo complete with contrast if indicated    [place] [address]    Transthoracic Echocardiogram  2D, M-mode, Doppler, and Color Doppler    Study date:  03-Sep-2020    Patient: ANTONIO LLANOS  MR number: [id number]  Account number: [id number]  : 1950  Age: 69 years  Gender: Male  Status: Outpatient  Location: [facility]  Height: 68 in  Weight: 141.7 lb  BP: 119/ 76 mmHg    Indications: Essential Hypertension    Diagnoses: I10. - Essential (primary) hypertension    Sonographer:  Alli Melendez RDCS  Primary Physician:  Gabriel Wilks MD  Referring Physician:  Christopher Lambert MD  Group:  [facility]  Interpreting Physician:  Vanesa Fair DO    SUMMARY    LEFT VENTRICLE:  Systolic function was normal. Ejection fraction was estimated to be 60 %.  There were no regional wall motion abnormalities.  Doppler parameters were consistent with abnormal left ventricular relaxation (grade 1 diastolic dysfunction).    RIGHT ATRIUM:  The right atrial size was at the upper limits of normal to mildly dilated.    AORTIC VALVE:  There was trace regurgitation.    TRICUSPID VALVE:  There was trace regurgitation.    COMPARISONS:  The previous study was not available for direct comparison.    HISTORY: PRIOR HISTORY: Hypertension, Hypertrophic Obstructive Cardiomyopathy, Palpitations    PROCEDURE: The study was performed in the [facility]. This was a routine study. The transthoracic approach was used. The study included complete 2D imaging, M-mode, complete spectral Doppler, and color Doppler.  The heart rate was 76 bpm, at the start of the study. Images were obtained from the parasternal, apical, subcostal, and suprasternal notch acoustic windows.  Image quality was adequate.    LEFT VENTRICLE: Size was normal. Systolic function was normal. Ejection fraction was estimated to be 60 %. There were no regional wall motion abnormalities. Wall thickness was normal. DOPPLER: Doppler parameters were consistent with  abnormal left ventricular relaxation (grade 1 diastolic dysfunction).    RIGHT VENTRICLE: The size was normal. Systolic function was normal. Wall thickness was normal.    LEFT ATRIUM: Size was normal.    RIGHT ATRIUM: The right atrial size was at the upper limits of normal to mildly dilated.    MITRAL VALVE: Valve structure was normal. There was normal leaflet separation. DOPPLER: The transmitral velocity was within the normal range. There was no evidence for stenosis. There was no regurgitation.    AORTIC VALVE: The valve was trileaflet. Leaflets exhibited normal thickness and normal cuspal separation. DOPPLER: Transaortic velocity was within the normal range. There was no evidence for stenosis. There was trace regurgitation.    TRICUSPID VALVE: The valve structure was normal. There was normal leaflet separation. DOPPLER: The transtricuspid velocity was within the normal range. There was no evidence for stenosis. There was trace regurgitation.    PULMONIC VALVE: Not well visualized. DOPPLER: The transpulmonic velocity was within the normal range. There was no significant regurgitation.    PERICARDIUM: There was no pericardial effusion. The pericardium was normal in appearance.    SYSTEMIC VEINS: IVC: The inferior vena cava was normal in size and course. Respirophasic changes were normal.    SYSTEM MEASUREMENT TABLES    2D  %FS: 34.06 %  Ao Diam: 2.63 cm  EDV(Teich): 61.93 ml  EF(Teich): 63.77 %  ESV(Cube): 15.72 ml  ESV(Teich): 22.44 ml  IVC: 18.21 mm  IVSd: 0.91 cm  LA Area: 11.61 cm2  LA Diam: 2.56 cm  LVEDV MOD A4C: 65.34 ml  LVEF MOD A4C: 61.53 %  LVESV MOD A4C: 25.14 ml  LVIDd: 3.8 cm  LVIDs: 2.51 cm  LVLd A4C: 8.69 cm  LVLs A4C: 7.44 cm  LVOT Diam:  "1.96 cm  LVPWd: 1.1 cm  RA Area: 18.48 cm2  RV Diam.: 4.09 cm  SI(Cube): 22.11 ml/m2  SI(Teich): 22.31 ml/m2  SV MOD A4C: 40.2 ml  SV(Cube): 39.13 ml  SV(Teich): 39.49 ml    CW  TR Vmax: 2.04 m/s  TR maxP.63 mmHg    MM  TAPSE: 1.99 cm    PW  E': 0.08 m/s  E/E': 7.46  MV A Clarke: 0.64 m/s  MV Dec [place]: 2.58 m/s2  MV DecT: 221.88 ms  MV E Clarke: 0.57 m/s  MV E/A Ratio: 0.89    [facility]    Prepared and electronically signed by    Vanesa Fair DO  Signed 03-Sep-2020 17:07:14    Name: Bubba Calle                       : 1950  MRN: [id number]                       Age: 74 y.o.  Patient Status: Outpatient          Gender: male  ECHO Complete With Contrast If Indicated    Height: 5' 8\" (1.727 m)   Weight: 66.2 kg (146 lb)   BSA: 1.79 m²   Blood Pressure: 118/78    Date of Study: 24   Ordering Provider: RYAN Woodward    Clinical Indications: HOCM (hypertrophic obstructive cardiomyopathy) (HCC) [I42.1 (ICD-10-CM)]       Reading Physicians  Performing Staff   Cardiology: Christopher Lambert MD    Tech: Belinda Gresham Khalil         Vitals    Height Weight BSA (Calculated - m2) BP Pulse   5' 8\" (1.727 m) 66.2 kg (146 lb) 1.79 sq meters 118/78 74     PACS Images     Show images for Echo complete w/ contrast if indicated  Study Details    This transthoracic echocardiogram was performed in the echo lab. This was a routine, outpatient study. Study quality was adequate. A complete 2D, color flow Doppler, spectral Doppler, 2D, color flow Doppler and spectral Doppler transthoracic echocardiogram was performed.  The apical, parasternal, subcostal and suprasternal views were obtained.     Interpretation Summary  Show Result Comparison     Left Ventricle: Left ventricular cavity size is normal. Wall thickness is mildly increased. There is mild asymmetric hypertrophy of the basal septal wall. The left ventricular ejection fraction is 60%. Systolic function is " normal. Global longitudinal strain is normal at -18%. Wall motion is normal. Diastolic function is mildly abnormal, consistent with grade I (abnormal) relaxation. There is no LV dynamic obstruction.    Aorta: The aortic root is normal in size. The ascending aorta is mildly dilated. The aortic root is 2.40 cm. The ascending aorta is 4 cm.     Strain was performed to quantify interventricular dyssynchrony and evaluate components of myocardial function due to (positive family history of HCM, family history of sudden death, HCM, Chemotherapy, complex CHD, genetic abnormality, viral infection) . Results from the utilization of Strain Analysis are listed in the report below.     Findings    Left Ventricle Left ventricular cavity size is normal. Wall thickness is mildly increased. There is mild asymmetric hypertrophy of the basal septal wall. The left ventricular ejection fraction is 60%. Systolic function is normal. Global longitudinal strain is normal at -18%.  Wall motion is normal. Diastolic function is mildly abnormal, consistent with grade I (abnormal) relaxation.  There is no LV dynamic obstruction.   Right Ventricle Right ventricular cavity size is normal. Systolic function is normal. Wall thickness is normal.   Left Atrium The atrium is normal in size.   Right Atrium The atrium is normal in size.   Aortic Valve The aortic valve is trileaflet. The leaflets are not thickened. The leaflets are not calcified. The leaflets exhibit normal mobility. There is trace regurgitation. The aortic valve has no significant stenosis.   Mitral Valve Mitral valve structure is normal.  There is trace regurgitation. There is no evidence of stenosis.   Tricuspid Valve Tricuspid valve structure is normal. There is trace regurgitation. There is no evidence of stenosis. The right ventricular systolic pressure is normal.   Pulmonic Valve Pulmonic valve structure is normal. There is no evidence of regurgitation. There is no evidence of  stenosis.   Ascending Aorta The aortic root is normal in size. The ascending aorta is mildly dilated. The aortic root is 2.40 cm. The ascending aorta is 4 cm.   IVC/SVC The inferior vena cava is normal in size.   Pericardium There is no pericardial effusion. The pericardium is normal in appearance.     Left Ventricle Measurements    Function/Volumes   A4C EF 56 %         Left ventricular stroke volume (2D) 27 mL         LV Diastolic Volume (BP) 59 mL         LV Systolic Volume (BP) 27 mL         EF 54 %         Dimensions   LVIDd 3.4 cm         LVIDS 2.4 cm         IVSd 0.8 cm         LVPWd 0.8 cm         FS 29         Diastolic Filling   MV E' Tissue Velocity Septal 10 cm/s         LA Volume Index (BP) 15.1 mL/m2         E/A ratio 0.67         E wave deceleration time 222 ms         MV Peak E Clarke 38 cm/s         MV Peak A Clarke 0.57 m/s         Strain   GLS -18 %               Right Ventricle Measurements    Dimensions   RVID d 4 cm         Tricuspid annular plane systolic excursion 2.3 cm               Left Atrium Measurements    Dimensions   LA size 2.1 cm         LA length (A2C) 4.3 cm         Volumes   LA volume (BP) 27 mL         LA Volume Index (BP) 15.1 mL/m2               Right Atrium Measurements    Dimensions   RAA A4C 14.7 cm2               Mitral Valve Measurements    Stenosis   MV stenosis pressure 1/2 time 64 ms         MV valve area p 1/2 method 3.44               Tricuspid Valve Measurements    RVSP Parameters   TR Peak Clarke 2.1 m/s         Triscuspid Valve Regurgitation Peak Gradient 17 mmHg               Aorta Measurements    Aortic Dimensions   Ao root 2.4 cm         Asc Ao 4 cm               Exam Details    Performed Procedure Technologist Supporting Staff Performing Physician   Echo complete Belinda Khalil            Appointment Date/Status Modality Department    8/6/2024     Completed BE HV ECHO 1 BE HV CAR NON INV           Begin Exam End Exam  End Exam Questionnaires   8/6/2024   7:51 AM 8/6/2024  8:34 AM  PATIENT EDUCATION            All Reviewers List    Christopher Lambert MD on 8/7/2024  9:10 AM     Signed    Electronically signed by Christopher Lambert MD on 8/6/24 at 0857 EDT     Counseling / Coordination of Care  Total time spent today 37 minutes.  Greater than 50% of total time was spent with the patient and / or family counseling and / or coordination of care.

## 2025-02-11 ENCOUNTER — OFFICE VISIT (OUTPATIENT)
Dept: CARDIOLOGY CLINIC | Facility: CLINIC | Age: 75
End: 2025-02-11
Payer: COMMERCIAL

## 2025-02-11 VITALS
WEIGHT: 149.2 LBS | OXYGEN SATURATION: 99 % | DIASTOLIC BLOOD PRESSURE: 68 MMHG | HEART RATE: 80 BPM | SYSTOLIC BLOOD PRESSURE: 124 MMHG | BODY MASS INDEX: 22.69 KG/M2

## 2025-02-11 DIAGNOSIS — I10 PRIMARY HYPERTENSION: ICD-10-CM

## 2025-02-11 DIAGNOSIS — I42.1 HOCM (HYPERTROPHIC OBSTRUCTIVE CARDIOMYOPATHY) (HCC): Primary | ICD-10-CM

## 2025-02-11 DIAGNOSIS — R00.2 PALPITATIONS: ICD-10-CM

## 2025-02-11 DIAGNOSIS — I51.7 LEFT VENTRICULAR HYPERTROPHY BY ELECTROCARDIOGRAM: ICD-10-CM

## 2025-02-11 PROCEDURE — 99214 OFFICE O/P EST MOD 30 MIN: CPT | Performed by: INTERNAL MEDICINE

## 2025-02-12 PROCEDURE — 93000 ELECTROCARDIOGRAM COMPLETE: CPT | Performed by: INTERNAL MEDICINE

## 2025-04-07 ENCOUNTER — TELEPHONE (OUTPATIENT)
Age: 75
End: 2025-04-07

## 2025-04-07 NOTE — TELEPHONE ENCOUNTER
I called the patient and left a voicemail on his identifiable machine.  No biopsies in the esophagus were taken as it appeared normal endoscopically.  He did have some mild gastritis H. pylori negative and a fundic gland polyp.  From what I read in the notes he was not on any medications.  Wanted to discuss LPR.  Advised that the first step would be a therapeutic trial of medication namely omeprazole 40 mg daily and then famotidine 40 mg at night.  We could have him seen in the office sooner with either Dr. Willams who saw him initially or myself because he is not scheduled to his Cindy.  You could put him on a waiting list perhaps.    I asked the patient to let us know if he would like me to proceed with prescribing those medications

## 2025-04-07 NOTE — TELEPHONE ENCOUNTER
Patients GI provider:  Dr. Willams    Number to return call: [phone]    Reason for call: Pt calling requesting a cb from the doctor to the findings of 12/17/25 biopsy-stating he would like to discuss LPR. Patient also scheduled for a follow up     Scheduled procedure/appointment date if applicable: 6/11/2025 @2:00

## 2025-05-22 ENCOUNTER — OFFICE VISIT (OUTPATIENT)
Dept: DERMATOLOGY | Facility: CLINIC | Age: 75
End: 2025-05-22

## 2025-05-22 VITALS — BODY MASS INDEX: 22.66 KG/M2 | WEIGHT: 149 LBS

## 2025-05-22 DIAGNOSIS — D22.61 MULTIPLE BENIGN MELANOCYTIC NEVI OF BOTH UPPER EXTREMITIES, BOTH LOWER EXTREMITIES, AND TRUNK: Primary | ICD-10-CM

## 2025-05-22 DIAGNOSIS — D22.5 MULTIPLE BENIGN MELANOCYTIC NEVI OF BOTH UPPER EXTREMITIES, BOTH LOWER EXTREMITIES, AND TRUNK: Primary | ICD-10-CM

## 2025-05-22 DIAGNOSIS — D22.71 MULTIPLE BENIGN MELANOCYTIC NEVI OF BOTH UPPER EXTREMITIES, BOTH LOWER EXTREMITIES, AND TRUNK: Primary | ICD-10-CM

## 2025-05-22 DIAGNOSIS — L57.0 KERATOSIS, ACTINIC: ICD-10-CM

## 2025-05-22 DIAGNOSIS — L82.1 SEBORRHEIC KERATOSIS: ICD-10-CM

## 2025-05-22 DIAGNOSIS — D22.62 MULTIPLE BENIGN MELANOCYTIC NEVI OF BOTH UPPER EXTREMITIES, BOTH LOWER EXTREMITIES, AND TRUNK: Primary | ICD-10-CM

## 2025-05-22 DIAGNOSIS — D22.72 MULTIPLE BENIGN MELANOCYTIC NEVI OF BOTH UPPER EXTREMITIES, BOTH LOWER EXTREMITIES, AND TRUNK: Primary | ICD-10-CM

## 2025-05-22 DIAGNOSIS — L81.4 LENTIGO: ICD-10-CM

## 2025-05-22 DIAGNOSIS — D48.5 NEOPLASM OF UNCERTAIN BEHAVIOR OF SKIN: ICD-10-CM

## 2025-05-22 DIAGNOSIS — D18.01 CHERRY ANGIOMA: ICD-10-CM

## 2025-05-22 PROCEDURE — 88305 TISSUE EXAM BY PATHOLOGIST: CPT | Performed by: STUDENT IN AN ORGANIZED HEALTH CARE EDUCATION/TRAINING PROGRAM

## 2025-05-22 PROCEDURE — 88342 IMHCHEM/IMCYTCHM 1ST ANTB: CPT | Performed by: STUDENT IN AN ORGANIZED HEALTH CARE EDUCATION/TRAINING PROGRAM

## 2025-05-22 NOTE — PATIENT INSTRUCTIONS
"INFORMED CONSENT DISCUSSION AND POST-OPERATIVE INSTRUCTIONS FOR PATIENT    I.  RATIONALE FOR PROCEDURE  I understand that a skin biopsy allows the Dermatologist to test a lesion or rash under the microscope to obtain a diagnosis.  It usually involves numbing the area with numbing medication and removing a small piece of skin; sometimes the area will be closed with sutures. In this specific procedure, sutures are not usually needed.  If any sutures are placed, then they are usually need to be removed in 2 weeks or less.    I understand that my Dermatologist recommends that a skin \"shave\" biopsy be performed today.  A local anesthetic, similar to the kind that a dentist uses when filling a cavity, will be injected with a very small needle into the skin area to be sampled.  The injected skin and tissue underneath \"will go to sleep” and become numb so no pain should be felt afterwards.  An instrument shaped like a tiny \"razor blade\" (shave biopsy instrument) will be used to cut a small piece of tissue and skin from the area so that a sample of tissue can be taken and examined more closely under the microscope.  A slight amount of bleeding will occur, but it will be stopped with direct pressure and a pressure bandage and any other appropriate methods.  I understands that a scar will form where the wound was created.  Surgical ointment will be applied to help protect the wound.  Sutures are not usually needed.    II.  RISKS AND POTENTIAL COMPLICATIONS   I understand the risks and potential complications of a skin biopsy include but are not limited to the following:  Bleeding  Infection  Pain  Scar/keloid  Skin discoloration  Incomplete Removal  Recurrence  Nerve Damage/Numbness/Loss of Function  Allergic Reaction to Anesthesia  Biopsies are diagnostic procedures and based on findings additional treatment or evaluation may be required  Loss or destruction of specimen resulting in no additional findings    My Dermatologist " "has explained to me the nature of the condition, the nature of the procedure, and the benefits to be reasonably expected compared with alternative approaches.  My Dermatologist has discussed the likelihood of major risks or complications of this procedure including the specific risks listed above, such as bleeding, infection, and scarring/keloid.  I understand that a scar is expected after this procedure.  I understand that my physician cannot predict if the scar will form a \"keloid,\" which extends beyond the borders of the wound that is created.  A keloid is a thick, painful, and bumpy scar.  A keloid can be difficult to treat, as it does not always respond well to therapy, which includes injecting cortisone directly into the keloid every few weeks.  While this usually reduces the pain and size of the scar, it does not eliminate it.      I understand that photographs may be taken before and after the procedure.  These will be maintained as part of the medical providers confidential records and may not be made available to me.  I further authorize the medical provider to use the photographs for teaching purposes or to illustrate scientific papers, books, or lectures if in his/her judgment, medical research, education, or science may benefit from its use.    I have had an opportunity to fully inquire about the risks and benefits of this procedure and its alternatives.   I have been given ample time and opportunity to ask questions and to seek a second opinion if I wished to do so.  I acknowledge that there have specifically been no guarantees as to the cosmetic results from the procedure.  I am aware that with any procedure there is always the possibility of an unexpected complication.    III. POST-PROCEDURAL CARE (WHAT YOU WILL NEED TO DO \"AFTER THE BIOPSY\" TO OPTIMIZE HEALING)    Keep the area clean and dry.  Try NOT to remove the bandage or get it wet for the first 24 hours.    Gently clean the area and apply " "surgical ointment (such as Vaseline petrolatum ointment, which is available \"over the counter\" and not a prescription) to the biopsy site for up to 2 weeks straight.  This acts to protect the wound from the outside world.      Sutures are not usually placed in this procedure.  If any sutures were placed, return for suture removal as instructed (generally 1 week for the face, 2 weeks for the body).      Take Acetaminophen (Tylenol) for discomfort, if no contraindications.  Ibuprofen or aspirin could make bleeding worse.    Call our office immediately for signs of infection: fever, chills, increased redness, warmth, tenderness, discomfort/pain, or pus or foul smell coming from the wound.    WHAT TO DO IF THERE IS ANY BLEEDING?  If a small amount of bleeding is noticed, place a clean cloth over the area and apply firm pressure for ten minutes.  Check the wound after 10 minutes of direct pressure.  If bleeding persists, try one more time for an additional 10 minutes of direct pressure on the area.  If the bleeding becomes heavier or does not stop after the second attempt, or if you have any other questions about this procedure, then please call your [facility]'s Dermatologist by calling [phone] (SKIN).     I hereby acknowledge that I have reviewed and verified the site with my Dermatologist and have requested and authorized my Dermatologist to proceed with the procedure.  "

## 2025-05-22 NOTE — PROGRESS NOTES
"[facility] Note     Patient Name: Bubba Calle  Encounter Date: 5/22/25       Have you been cared for by a [facility] in the last 3 years and, if so, which description applies to you? Yes. I have been here within the last 3 years, and my medical history has NOT changed since that time. I am not of child-bearing potential.     REVIEW OF SYSTEMS:  Have you recently had or currently have any of the following? No changes in my recent health.   PAST MEDICAL HISTORY:  Have you personally ever had or currently have any of the following?  If \"YES,\" then please provide more detail. No changes in my medical history.   HISTORY OF IMMUNOSUPPRESSION: Do you have a history of any of the following:  Systemic Immunosuppression such as Diabetes, Biologic or Immunotherapy, Chemotherapy, Organ Transplantation, Bone Marrow Transplantation or Prednisone?  No     Answering \"YES\" requires the addition of the dotphrase \"IMMUNOSUPPRESSED\" as the first diagnosis of the patient's visit.   FAMILY HISTORY:  Any \"first degree relatives\" (parent, brother, sister, or child) with the following?    No changes in my family's known health.   PATIENT EXPERIENCE:    Do you want the Dermatologist to perform a COMPLETE skin exam today including a clinical examination under the \"bra and underwear\" areas?  Yes  If necessary, do we have your permission to call and leave a detailed message on your Preferred Phone number that includes your specific medical information?  Yes      Allergies[1] Current Medications[2]              Whom besides the patient is providing clinical information about today's encounter?   NO ADDITIONAL HISTORIAN (patient alone provided history)    Physical Exam and Assessment/Plan by Diagnosis:  NEOPLASM OF UNCERTAIN BEHAVIOR OF SKIN    Physical Exam:  (Anatomic Location); (Size and Morphological Description); (Differential Diagnosis):  Specimen A: left lateral lower leg; 1.5 cm x 0.8 cm crusted pink " "plaque; DDX: Hypertrophic Actinic Keratosis vs Squamous cell carcinoma vs Basal cell carcinoma        Additional History of Present Condition:  Present on exam. Patient states it has been present for a few months and never fully healed.     Assessment and Plan:  I have discussed with the patient that a sample of skin via a \"skin biopsy” would be potentially helpful to further make a specific diagnosis under the microscope.  Based on a thorough discussion of this condition and the management approach to it (including a comprehensive discussion of the known risks, side effects and potential benefits of treatment), the patient (family) agrees to implement the following specific plan:    Procedure:  Skin Biopsy.  After a thorough discussion of treatment options and risk/benefits/alternatives (including but not limited to local pain, scarring, dyspigmentation, blistering, possible superinfection, and inability to confirm a diagnosis via histopathology), verbal and written consent were obtained and portion of the rash was biopsied for tissue sample.  See below for consent that was obtained from patient and subsequent Procedure Note.  Will call with results.   PROCEDURE TANGENTIAL (SHAVE) BIOPSY NOTE:    Performing Physician: Nessa Ramírez PA-C  Anatomic Location; Clinical Description with size (cm); Pre-Op Diagnosis:   Specimen A: left lateral lower leg; 1.5 cm x 0.8 cm crusted pink plaque; DDX: Hypertrophic Actinic Keratosis vs Squamous cell carcinoma vs Basal cell carcinoma  Post-op diagnosis: Same     Local anesthesia: 1% xylocaine with epi      Topical anesthesia: None    Hemostasis: Aluminum chloride       After obtaining informed consent  at which time there was a discussion about the purpose of biopsy  and low risks of infection and bleeding.  The area was prepped and draped in the usual fashion. Anesthesia was obtained with 1% lidocaine with epinephrine. A shave biopsy to an appropriate sampling depth was " "obtained by Shave (Dermablade or 15 blade) The resulting wound was covered with surgical ointment and bandaged appropriately.     The patient tolerated the procedure well without complications and was without signs of functional compromise.      Specimen has been sent for review by Dermatopathology.    Standard post-procedure care has been explained and has been included in written form within the patient's copy of Informed Consent.    INFORMED CONSENT DISCUSSION AND POST-OPERATIVE INSTRUCTIONS FOR PATIENT    I.  RATIONALE FOR PROCEDURE  I understand that a skin biopsy allows the Dermatologist to test a lesion or rash under the microscope to obtain a diagnosis.  It usually involves numbing the area with numbing medication and removing a small piece of skin; sometimes the area will be closed with sutures. In this specific procedure, sutures are not usually needed.  If any sutures are placed, then they are usually need to be removed in 2 weeks or less.    I understand that my Dermatologist recommends that a skin \"shave\" biopsy be performed today.  A local anesthetic, similar to the kind that a dentist uses when filling a cavity, will be injected with a very small needle into the skin area to be sampled.  The injected skin and tissue underneath \"will go to sleep” and become numb so no pain should be felt afterwards.  An instrument shaped like a tiny \"razor blade\" (shave biopsy instrument) will be used to cut a small piece of tissue and skin from the area so that a sample of tissue can be taken and examined more closely under the microscope.  A slight amount of bleeding will occur, but it will be stopped with direct pressure and a pressure bandage and any other appropriate methods.  I understands that a scar will form where the wound was created.  Surgical ointment will be applied to help protect the wound.  Sutures are not usually needed.    II.  RISKS AND POTENTIAL COMPLICATIONS   I understand the risks and potential " "complications of a skin biopsy include but are not limited to the following:  Bleeding  Infection  Pain  Scar/keloid  Skin discoloration  Incomplete Removal  Recurrence  Nerve Damage/Numbness/Loss of Function  Allergic Reaction to Anesthesia  Biopsies are diagnostic procedures and based on findings additional treatment or evaluation may be required  Loss or destruction of specimen resulting in no additional findings    My Dermatologist has explained to me the nature of the condition, the nature of the procedure, and the benefits to be reasonably expected compared with alternative approaches.  My Dermatologist has discussed the likelihood of major risks or complications of this procedure including the specific risks listed above, such as bleeding, infection, and scarring/keloid.  I understand that a scar is expected after this procedure.  I understand that my physician cannot predict if the scar will form a \"keloid,\" which extends beyond the borders of the wound that is created.  A keloid is a thick, painful, and bumpy scar.  A keloid can be difficult to treat, as it does not always respond well to therapy, which includes injecting cortisone directly into the keloid every few weeks.  While this usually reduces the pain and size of the scar, it does not eliminate it.      I understand that photographs may be taken before and after the procedure.  These will be maintained as part of the medical providers confidential records and may not be made available to me.  I further authorize the medical provider to use the photographs for teaching purposes or to illustrate scientific papers, books, or lectures if in his/her judgment, medical research, education, or science may benefit from its use.    I have had an opportunity to fully inquire about the risks and benefits of this procedure and its alternatives.   I have been given ample time and opportunity to ask questions and to seek a second opinion if I wished to do so.  I " "acknowledge that there have specifically been no guarantees as to the cosmetic results from the procedure.  I am aware that with any procedure there is always the possibility of an unexpected complication.    III. POST-PROCEDURAL CARE (WHAT YOU WILL NEED TO DO \"AFTER THE BIOPSY\" TO OPTIMIZE HEALING)    Keep the area clean and dry.  Try NOT to remove the bandage or get it wet for the first 24 hours.    Gently clean the area and apply surgical ointment (such as Vaseline petrolatum ointment, which is available \"over the counter\" and not a prescription) to the biopsy site for up to 2 weeks straight.  This acts to protect the wound from the outside world.      Sutures are not usually placed in this procedure.  If any sutures were placed, return for suture removal as instructed (generally 1 week for the face, 2 weeks for the body).      Take Acetaminophen (Tylenol) for discomfort, if no contraindications.  Ibuprofen or aspirin could make bleeding worse.    Call our office immediately for signs of infection: fever, chills, increased redness, warmth, tenderness, discomfort/pain, or pus or foul smell coming from the wound.    WHAT TO DO IF THERE IS ANY BLEEDING?  If a small amount of bleeding is noticed, place a clean cloth over the area and apply firm pressure for ten minutes.  Check the wound after 10 minutes of direct pressure.  If bleeding persists, try one more time for an additional 10 minutes of direct pressure on the area.  If the bleeding becomes heavier or does not stop after the second attempt, or if you have any other questions about this procedure, then please call your [facility]'s Dermatologist by calling [phone] (SKIN).     I hereby acknowledge that I have reviewed and verified the site with my Dermatologist and have requested and authorized my Dermatologist to proceed with the procedure.    MELANOCYTIC NEVI (\"Moles\")    Physical Exam:  Anatomic Location Affected:   Mostly on sun-exposed areas of the trunk " "and extremities   Morphological Description:  Scattered, 1-4mm round to ovoid, symmetrical-appearing, even bordered, skin colored to dark brown macules/papules      Additional History of Present Condition:  Present on exam. Denies any pain, itch, bleeding. Present for years. Denies actively changing or growing moles.     Assessment and Plan:  Based on a thorough discussion of this condition and the management approach to it (including a comprehensive discussion of the known risks, side effects and potential benefits of treatment), the patient (family) agrees to implement the following specific plan:  Reassured benign.   Monitor for changes. ABCDE's of melanoma handout provided.   Practice sun protection. Apply broad spectrum (UVA and UVB) sunscreen, SPF 30 or higher every 2 hours. Wear sun protective clothing, hats, and sunglasses.        LENTIGO    Physical Exam:  Anatomic Location Affected:  sun exposed areas of trunks and extremities   Morphological Description:  multiple scattered tan to brown evenly pigmented macules      Additional History of Present Condition:  Present on exam.     Assessment and Plan:  Based on a thorough discussion of this condition and the management approach to it (including a comprehensive discussion of the known risks, side effects and potential benefits of treatment), the patient (family) agrees to implement the following specific plan:  Reassured benign.   Practice sun protection. Apply broad spectrum (UVA and UVB) sunscreen, SPF 30 or higher every 2 hours. Wear sun protective clothing, hats, and sunglasses.     SEBORRHEIC KERATOSIS; NON-INFLAMED    Physical Exam:  Anatomic Location Affected:  trunk and extremities   Morphological Description:  Flat and raised, waxy, smooth to warty textured, yellow to brownish-grey to dark brown to blackish, discrete, \"stuck-on\" appearing papules.      Additional History of Present Condition:  Present on exam. Patient denies any itching. " "    Assessment and Plan:  Based on a thorough discussion of this condition and the management approach to it (including a comprehensive discussion of the known risks, side effects and potential benefits of treatment), the patient (family) agrees to implement the following specific plan:  Reassured benign.       ANGIOMA (\"CHERRY ANGIOMA\")     Physical Exam:  Anatomic Location: scattered across trunk and extremities   Morphologic Description: 1-2 mm firm red to maroon to purples to blue discrete papule, on dermoscopy lacunae  by white septae seen       Additional History of Present Condition:  Present on exam.     Plan:  Reassured benign.       ACTINIC KERATOSES  Physical Exam:  Anatomic Location Affected:  forehead  Morphological Description:  Thin pink macule with gritty scale       Assessment and Plan:  Based on a thorough discussion of this condition and the management approach to it (including a comprehensive discussion of the known risks, side effects and potential benefits of treatment), the patient (family) agrees to implement the following specific plan:  Treated with cryotherapy today; written and verbal consent obtained   If does not resolve in 4-6 weeks, recommend follow up for further evaluation.     PROCEDURE:  DESTRUCTION OF PRE-MALIGNANT LESIONS  After a thorough discussion of treatment options and risk/benefits/alternatives (including but not limited to local pain, scarring, dyspigmentation, blistering, and possible superinfection), verbal and written consent were obtained and the aforementioned lesions were treated on with cryotherapy using liquid nitrogen x 2 cycles for 5-10 seconds. The patient tolerated the procedure well, and after-care instructions were provided.     TOTAL NUMBER of 1 pre-malignant lesions were treated today on the ANATOMIC LOCATION: forehead.       Patient instructions:  Your pre-cancerous lesions (called actinic keratosis) were treated with liquid nitrogen today. The " treated areas will get more red, crusted over the next few days. There might be some blistering. Apply vaseline to the treated area for the next week to help it heal fully. Do not pick at the area. Return in 3-4 weeks for another round of liquid nitrogen treatment if lesion(s)  fails to fully resolve.     Follow-up: 1 year for skin exam.   Scribe Attestation      I,:  Kendra Billy MA am acting as a scribe while in the presence of the attending physician.:       I,:  Nessa Lopez PA-C personally performed the services described in this documentation    as scribed in my presence.:                  [1] No Known Allergies  [2]   Current Outpatient Medications:     Ascorbic Acid 500 MG CAPS, Take 1,000 mg by mouth 2 (two) times a day   (Patient not taking: Reported on 12/17/2024), Disp: , Rfl:     losartan (COZAAR) 25 mg tablet, Take 1 tablet (25 mg total) by mouth in the morning., Disp: 90 tablet, Rfl: 3    metroNIDAZOLE (FLAGYL) 250 mg tablet, , Disp: , Rfl:     multivitamin (THERAGRAN) TABS, Take 1 tablet by mouth daily  (Patient not taking: Reported on 12/17/2024), Disp: , Rfl:     tetracycline (ACHROMYCIN,SUMYCIN) 500 MG capsule, , Disp: , Rfl:

## 2025-05-27 ENCOUNTER — RESULTS FOLLOW-UP (OUTPATIENT)
Age: 75
End: 2025-05-27

## 2025-05-27 PROCEDURE — 88342 IMHCHEM/IMCYTCHM 1ST ANTB: CPT | Performed by: STUDENT IN AN ORGANIZED HEALTH CARE EDUCATION/TRAINING PROGRAM

## 2025-05-27 PROCEDURE — 88305 TISSUE EXAM BY PATHOLOGIST: CPT | Performed by: STUDENT IN AN ORGANIZED HEALTH CARE EDUCATION/TRAINING PROGRAM

## 2025-06-06 ENCOUNTER — PATIENT MESSAGE (OUTPATIENT)
Dept: DERMATOLOGY | Facility: CLINIC | Age: 75
End: 2025-06-06

## 2025-06-11 ENCOUNTER — OFFICE VISIT (OUTPATIENT)
Dept: GASTROENTEROLOGY | Facility: CLINIC | Age: 75
End: 2025-06-11
Payer: COMMERCIAL

## 2025-06-11 VITALS
WEIGHT: 144 LBS | BODY MASS INDEX: 21.82 KG/M2 | HEIGHT: 68 IN | SYSTOLIC BLOOD PRESSURE: 118 MMHG | DIASTOLIC BLOOD PRESSURE: 70 MMHG

## 2025-06-11 DIAGNOSIS — K21.9 GASTROESOPHAGEAL REFLUX DISEASE, UNSPECIFIED WHETHER ESOPHAGITIS PRESENT: Primary | ICD-10-CM

## 2025-06-11 DIAGNOSIS — Z86.0100 HISTORY OF COLON POLYPS: ICD-10-CM

## 2025-06-11 PROCEDURE — 99213 OFFICE O/P EST LOW 20 MIN: CPT | Performed by: INTERNAL MEDICINE

## 2025-06-11 NOTE — Clinical Note
Patient is due for 5-year surveillance colonoscopy due to history of polyps.  Declining to schedule now.  States he will reconsider.  Please send him a recall reminder in about 3 months.

## 2025-06-11 NOTE — PATIENT INSTRUCTIONS
Continue with dietary management of reflux  Elevate head of bed and avoid eating before bedtime  Antacids as needed  Call if symptoms flare  Consider screening colonoscopy

## 2025-06-11 NOTE — LETTER
2025     Gabriel Wilks MD  [address]  [address]  [facility] [address]    Patient: Bubba Calle   YOB: 1950   Date of Visit: 2025       Dear Dr. Gabriel Wilks MD:    Thank you for referring Bubba Calle to me for evaluation. Below are my notes for this consultation.    If you have questions, please do not hesitate to call me. I look forward to following your patient along with you.         Sincerely,        Tiffanie Willams MD        CC: No Recipients    Tiffanie Willams MD  2025  2:46 PM  Sign when Signing Visit  Name: Bubba Calle      : 1950      MRN: [id number]  Encounter Provider: Tiffanie Willams MD  Encounter Date: 2025   Encounter department: [facility]  :  Assessment & Plan  Gastroesophageal reflux disease, unspecified whether esophagitis present  Reflux clinically improved with dietary management, avoiding trigger foods and cutting out eating before bedtime.  Also elevating head of bed.  Hoarseness is also improved and ENT exam showed less laryngeal irritation but did demonstrate some laryngeal weakness.  No alarm symptoms of dysphagia weight loss or bleeding.  Continue with dietary management of reflux  Elevate head of bed and avoid eating before bedtime  Antacids as needed  Call if symptoms flare       History of colon polyps  Discussed past history of polyps.  Last colonoscopy 5 years ago and was negative exam.  Patient is reluctant to pursue additional surveillance colonoscopies.  Discussed indications for continued surveillance given age and risk.  Declines scheduling presently but will consider in the future.  Recommend surveillance colonoscopy, will send patient a reminder to consider scheduling in about 3 months           History of Present Illness  Bubba Calle is a 74 y.o. male who presents for follow-up of reflux and hoarseness.  HPI  History obtained from: patient  GERD stable.  No dysphagia or  "heartburn.  Cut out eating at nighttime.    Saw ENT and inflammation better, saw weakened laryngeal muscles.  CT reported negative.  Appetite and weight stable.  Controlling symptoms just with diet modification.  Having regular bowel movements.  No melena or hematochezia.  Discussed colorectal cancer screening.  Does have a history of polyps but reluctant to pursue any further colonoscopies.    Review of Systems A complete review of systems is negative other than that noted above in the HPI.      Current Medications[1]  Objective  /70   Ht 5' 8\" (1.727 m)   Wt 65.3 kg (144 lb)   BMI 21.90 kg/m²     Physical Exam General Appearance: NAD, cooperative, alert  Eyes: Anicteric, conjunctiva pink  ENT:  Normocephalic, atraumatic, normal mucosa.    Neck:  Supple, symmetrical, trachea midline  Resp:  Clear to auscultation bilaterally; no rales, rhonchi or wheezing; respirations unlabored   CV:  S1 S2, Regular rate and rhythm; no murmur, rub, or gallop.  GI:  Soft, non-tender, non-distended; normal bowel sounds; no masses, no organomegaly   Rectal: Deferred  Musculoskeletal: No cyanosis, clubbing or edema. Normal ROM.  Skin:  No jaundice, rashes, or lesions   Heme/Lymph: No palpable cervical lymphadenopathy  Psych: Normal affect, good eye contact  Neuro: No gross deficits, AAOx3       Lab Results: I personally reviewed relevant lab results.       Results for orders placed during the hospital encounter of 12/17/24    EGD    Narrative  Table formatting from the original result was not included.  [facility]  [address] [place] [id number] [phone]      DATE OF SERVICE:  12/17/24    PHYSICIAN(S):  Attending:  David Rosenbaum MD    Fellow:  No Staff Documented      INDICATION:  Gastroesophageal reflux disease, unspecified whether esophagitis present    POST-OP DIAGNOSIS:  See the impression below.    PREPROCEDURE:  Informed consent was obtained for the procedure, including " sedation.  Risks of perforation, hemorrhage, adverse drug reaction and aspiration were discussed. The patient was placed in the left lateral decubitus position.    Patient was explained about the risks and benefits of the procedure. Risks including but not limited to bleeding, infection, and perforation were explained in detail. Also explained about less than 100% sensitivity with the exam and other alternatives.    PROCEDURE: EGD    DETAILS OF PROCEDURE:  Patient was taken to the procedure room where a time out was performed to confirm correct patient and correct procedure. The patient underwent monitored anesthesia care, which was administered by an anesthesia professional. The patient's blood pressure, heart rate, level of consciousness, respirations, oxygen, ECG and ETCO2 were monitored throughout the procedure. The scope was introduced through the mouth and advanced to the second part of the duodenum. Retroflexion was performed in the fundus. Prior to the procedure, the patient's H. Pylori status was unknown. The patient experienced no blood loss. The procedure was not difficult. The patient tolerated the procedure well. There were no apparent adverse events.    ANESTHESIA INFORMATION:  ASA: III  Anesthesia Type: IV Sedation with Anesthesia    MEDICATIONS:  No administrations occurring from 1411 to 1427 on 12/17/24      FINDINGS:  All observed locations appeared normal, including the esophagus. Z-line is 42 cm from the incisors.  Regular Z-line 42 cm from the incisors  Mild, patchy erythematous and granular mucosa with loss of vascular pattern in the body of the stomach and antrum, consistent with gastritis; performed 6 cold forceps biopsies to rule out H. pylori  One 5 mm sessile, benign-appearing and inflammatory polyp in the fundus of the stomach; performed cold forceps biopsy with complete en bloc removal. Polyp was located along the lesser curvature of the stomach  The duodenal bulb appeared normal. Minor  duodenitis in the bulb of the duodenum  The cardia, fundus of the stomach and 2nd part of the duodenum appeared normal.      SPECIMENS:  ID Type Source Tests Collected by Time Destination  1 : antral bx r/o h pylori Tissue Stomach TISSUE EXAM David Rosenbaum MD 12/17/2024  2:25 PM  2 : fundus polyp bx Tissue Stomach TISSUE EXAM David Rosenbaum MD 12/17/2024  2:26 PM          Impression  Normal-appearing esophagus  Mild gastritis antrum and body of the stomach biopsies taken to exclude H. pylori  Small polyp fundus of the stomach 5-6 meters.  Appeared inflammatory along the lesser curvature.  Biopsies taken to remove the polyp  Mild duodenitis in the bulb  Normal proximal stomach and normal second portion of the duodenum      RECOMMENDATION:  Await pathology results  Continue present medications  Could consider additional testing to evaluate for LPR i.e. pH studies.  Discuss at next visit  Will call with biopsy results 7 to 10 days  Follow-up in the office 1 to 2 months or as scheduled            David Rosenbaum MD  Biopsy revealed fundic gland polyp.  H. pylori negative.             [1]   Current Outpatient Medications   Medication Sig Dispense Refill   • losartan (COZAAR) 25 mg tablet Take 1 tablet (25 mg total) by mouth in the morning. 90 tablet 3   • Ascorbic Acid 500 MG CAPS Take 1,000 mg by mouth 2 (two) times a day   (Patient not taking: Reported on 12/17/2024)     • metroNIDAZOLE (FLAGYL) 250 mg tablet  (Patient not taking: Reported on 5/16/2024)     • multivitamin (THERAGRAN) TABS Take 1 tablet by mouth daily  (Patient not taking: Reported on 12/17/2024)     • tetracycline (ACHROMYCIN,SUMYCIN) 500 MG capsule  (Patient not taking: Reported on 5/16/2024)       No current facility-administered medications for this visit.

## 2025-06-11 NOTE — PROGRESS NOTES
"Name: Bubba Calle      : 1950      MRN: [id number]  Encounter Provider: Tiffanie Willams MD  Encounter Date: 2025   Encounter department: [facility]  :  Assessment & Plan  Gastroesophageal reflux disease, unspecified whether esophagitis present  Reflux clinically improved with dietary management, avoiding trigger foods and cutting out eating before bedtime.  Also elevating head of bed.  Hoarseness is also improved and ENT exam showed less laryngeal irritation but did demonstrate some laryngeal weakness.  No alarm symptoms of dysphagia weight loss or bleeding.  Continue with dietary management of reflux  Elevate head of bed and avoid eating before bedtime  Antacids as needed  Call if symptoms flare       History of colon polyps  Discussed past history of polyps.  Last colonoscopy 5 years ago and was negative exam.  Patient is reluctant to pursue additional surveillance colonoscopies.  Discussed indications for continued surveillance given age and risk.  Declines scheduling presently but will consider in the future.  Recommend surveillance colonoscopy, will send patient a reminder to consider scheduling in about 3 months           History of Present Illness   Bubba Calle is a 74 y.o. male who presents for follow-up of reflux and hoarseness.  HPI  History obtained from: patient  GERD stable.  No dysphagia or heartburn.  Cut out eating at nighttime.    Saw ENT and inflammation better, saw weakened laryngeal muscles.  CT reported negative.  Appetite and weight stable.  Controlling symptoms just with diet modification.  Having regular bowel movements.  No melena or hematochezia.  Discussed colorectal cancer screening.  Does have a history of polyps but reluctant to pursue any further colonoscopies.    Review of Systems A complete review of systems is negative other than that noted above in the HPI.      Current Medications[1]  Objective   /70   Ht 5' 8\" (1.727 " m)   Wt 65.3 kg (144 lb)   BMI 21.90 kg/m²     Physical Exam General Appearance: NAD, cooperative, alert  Eyes: Anicteric, conjunctiva pink  ENT:  Normocephalic, atraumatic, normal mucosa.    Neck:  Supple, symmetrical, trachea midline  Resp:  Clear to auscultation bilaterally; no rales, rhonchi or wheezing; respirations unlabored   CV:  S1 S2, Regular rate and rhythm; no murmur, rub, or gallop.  GI:  Soft, non-tender, non-distended; normal bowel sounds; no masses, no organomegaly   Rectal: Deferred  Musculoskeletal: No cyanosis, clubbing or edema. Normal ROM.  Skin:  No jaundice, rashes, or lesions   Heme/Lymph: No palpable cervical lymphadenopathy  Psych: Normal affect, good eye contact  Neuro: No gross deficits, AAOx3       Lab Results: I personally reviewed relevant lab results.       Results for orders placed during the hospital encounter of 12/17/24    EGD    Narrative  Table formatting from the original result was not included.  [facility]  [address] [place] [id number]  [phone]  [phone]      DATE OF SERVICE:  12/17/24    PHYSICIAN(S):  Attending:  David Rosenbaum MD    Fellow:  No Staff Documented      INDICATION:  Gastroesophageal reflux disease, unspecified whether esophagitis present    POST-OP DIAGNOSIS:  See the impression below.    PREPROCEDURE:  Informed consent was obtained for the procedure, including sedation.  Risks of perforation, hemorrhage, adverse drug reaction and aspiration were discussed. The patient was placed in the left lateral decubitus position.    Patient was explained about the risks and benefits of the procedure. Risks including but not limited to bleeding, infection, and perforation were explained in detail. Also explained about less than 100% sensitivity with the exam and other alternatives.    PROCEDURE: EGD    DETAILS OF PROCEDURE:  Patient was taken to the procedure room where a time out was performed to confirm correct patient and  correct procedure. The patient underwent monitored anesthesia care, which was administered by an anesthesia professional. The patient's blood pressure, heart rate, level of consciousness, respirations, oxygen, ECG and ETCO2 were monitored throughout the procedure. The scope was introduced through the mouth and advanced to the second part of the duodenum. Retroflexion was performed in the fundus. Prior to the procedure, the patient's H. Pylori status was unknown. The patient experienced no blood loss. The procedure was not difficult. The patient tolerated the procedure well. There were no apparent adverse events.    ANESTHESIA INFORMATION:  ASA: III  Anesthesia Type: IV Sedation with Anesthesia    MEDICATIONS:  No administrations occurring from 1411 to 1427 on 12/17/24      FINDINGS:  All observed locations appeared normal, including the esophagus. Z-line is 42 cm from the incisors.  Regular Z-line 42 cm from the incisors  Mild, patchy erythematous and granular mucosa with loss of vascular pattern in the body of the stomach and antrum, consistent with gastritis; performed 6 cold forceps biopsies to rule out H. pylori  One 5 mm sessile, benign-appearing and inflammatory polyp in the fundus of the stomach; performed cold forceps biopsy with complete en bloc removal. Polyp was located along the lesser curvature of the stomach  The duodenal bulb appeared normal. Minor duodenitis in the bulb of the duodenum  The cardia, fundus of the stomach and 2nd part of the duodenum appeared normal.      SPECIMENS:  ID Type Source Tests Collected by Time Destination  1 : antral bx r/o h pylori Tissue Stomach TISSUE EXAM David Rosenbaum MD 12/17/2024  2:25 PM  2 : fundus polyp bx Tissue Stomach TISSUE EXAM David Rosenbaum MD 12/17/2024  2:26 PM          Impression  Normal-appearing esophagus  Mild gastritis antrum and body of the stomach biopsies taken to exclude H. pylori  Small polyp fundus of the stomach 5-6 meters.  Appeared  inflammatory along the lesser curvature.  Biopsies taken to remove the polyp  Mild duodenitis in the bulb  Normal proximal stomach and normal second portion of the duodenum      RECOMMENDATION:  Await pathology results  Continue present medications  Could consider additional testing to evaluate for LPR i.e. pH studies.  Discuss at next visit  Will call with biopsy results 7 to 10 days  Follow-up in the office 1 to 2 months or as scheduled            David Rosenbaum MD  Biopsy revealed fundic gland polyp.  H. pylori negative.             [1]   Current Outpatient Medications   Medication Sig Dispense Refill    losartan (COZAAR) 25 mg tablet Take 1 tablet (25 mg total) by mouth in the morning. 90 tablet 3    Ascorbic Acid 500 MG CAPS Take 1,000 mg by mouth 2 (two) times a day   (Patient not taking: Reported on 12/17/2024)      metroNIDAZOLE (FLAGYL) 250 mg tablet  (Patient not taking: Reported on 5/16/2024)      multivitamin (THERAGRAN) TABS Take 1 tablet by mouth daily  (Patient not taking: Reported on 12/17/2024)      tetracycline (ACHROMYCIN,SUMYCIN) 500 MG capsule  (Patient not taking: Reported on 5/16/2024)       No current facility-administered medications for this visit.

## 2025-06-19 ENCOUNTER — OFFICE VISIT (OUTPATIENT)
Dept: DERMATOLOGY | Facility: CLINIC | Age: 75
End: 2025-06-19
Payer: COMMERCIAL

## 2025-06-19 VITALS — TEMPERATURE: 98 F

## 2025-06-19 DIAGNOSIS — L56.5 DSAP (DISSEMINATED SUPERFICIAL ACTINIC POROKERATOSIS): Primary | ICD-10-CM

## 2025-06-19 PROCEDURE — 99214 OFFICE O/P EST MOD 30 MIN: CPT | Performed by: DERMATOLOGY

## 2025-06-19 PROCEDURE — 17110 DESTRUCTION B9 LES UP TO 14: CPT | Performed by: DERMATOLOGY

## 2025-06-19 NOTE — PROGRESS NOTES
"[facility] Note     Patient Name: Bubba Calle  Encounter Date: 6/19/25       Have you been cared for by a [facility] Dermatologist in the last 3 years and, if so, which description applies to you? Yes. I have been here within the last 3 years, and my medical history has NOT changed since that time. I am not of child-bearing potential.     REVIEW OF SYSTEMS:  Have you recently had or currently have any of the following? No changes in my recent health.   PAST MEDICAL HISTORY:  Have you personally ever had or currently have any of the following?  If \"YES,\" then please provide more detail. No changes in my medical history.   HISTORY OF IMMUNOSUPPRESSION: Do you have a history of any of the following:  Systemic Immunosuppression such as Diabetes, Biologic or Immunotherapy, Chemotherapy, Organ Transplantation, Bone Marrow Transplantation or Prednisone?  No     Answering \"YES\" requires the addition of the dotphrase \"IMMUNOSUPPRESSED\" as the first diagnosis of the patient's visit.   FAMILY HISTORY:  Any \"first degree relatives\" (parent, brother, sister, or child) with the following?    No changes in my family's known health.   PATIENT EXPERIENCE:    Do you want the Dermatologist to perform a COMPLETE skin exam today including a clinical examination under the \"bra and underwear\" areas?  NO  If necessary, do we have your permission to call and leave a detailed message on your Preferred Phone number that includes your specific medical information?  Yes      Allergies[1] Current Medications[2]              Whom besides the patient is providing clinical information about today's encounter?   NO ADDITIONAL HISTORIAN (patient alone provided history)    Physical Exam and Assessment/Plan by Diagnosis:    DISSEMINATED SUPERFICIAL ACTINIC POROKERATOSIS (DSAP)    Physical Exam:  Anatomic Location Affected:  bilateral lower legs and arms  Morphological Description:  numerous thin scaling papules with thread like " border and background actinic damage. Biopsy site on left leg still with thick crust and has not healed.    Additional History of Present Condition:  73 yo male with dry and scaling skin. He had a biopsy done by Nessa Lopez PA-C and it came back as porokeratosis.           Component  Ref Range & Units (hover)     Case Report   Surgical Pathology Report                         Case: [id number]                                   Authorizing Provider:  Nessa Lopez PA-C     Collected:           05/22/2025 09               Ordering Location:     [facility]      Received:            05/22/2025 [address] [place] [address]                                                                 Pathologist:           Mary Lou Duque MD                                                           Specimen:    Skin, Other, Specimen A: left lateral lower leg                                             Final Diagnosis   A. Skin, left lateral lower leg, shave biopsy:     ACTINIC POROKERATOSIS, diffusely inflamed (see note).     Note: Multifocal mild epidermal squamous dysplasia is seen. Conservative removal/destruction (e.g., cryotherapy) of any residual/recurrent lesion is recommended to ensure against local persistence/recurrence.          Electronically signed by Mary Lou Duque MD on 5/27/2025 at 1009 ED          Assessment and Plan:  Based on a thorough discussion of this condition and the management approach to it (including a comprehensive discussion of the known risks, side effects and potential benefits of treatment), the patient (family) agrees to implement the following specific plan:  Given extent of lesions, this best fits with DSAP  Offered cryotherapy, lovastatin compounded cream, topical steroid  Start using a thick daily moisturizer like CeraVe or Eucerin  Given that lesion on leg was still in healing stage and heavily crusted, did not treat this area directly- need recheck  "once it has healed  If you decide you want to do more treatment call the office for an appointment or contact Dr. Hung directly through [facility]        What is disseminated superficial actinic porokeratosis?  Disseminated superficial actinic porokeratosis, or DSAP, is an inherited keratinisation disorder that causes discrete dry patches on the arms and legs.  DSAP is a special type of inherited 'sunspot\". The name porokeratosis means scaly pore, and is a misnomer as porokeratosis is not related to pores.    Who gets disseminated superficial actinic porokeratosis?  DSAP most often affects people of  descent, although it has also been reported to affect individuals of other races. It is more common in women than in men.  The average age which patients first notice DSAP is about 35-40 years and its frequency in affected families increases steadily with age. It is rare in childhood.  DSAP may arise in immune suppressed patients, including after organ transplantation. Its onset can also be triggered by sun exposure, phototherapy, injury, infection or systemic disease.    What causes disseminated superficial actinic porokeratosis?  DSAP is due to a genetic mutation. The tendency to DSAP is inherited as an autosomal dominant characteristic, which means on average half of the children of an affected parent will also have the tendency. A locus on chromosome 12 was found to be responsible for DSAP in a [place] family (J Invest Dermatol 2000 Riki;114(6):1071-4). The causative genes in affected families have included spliceosome-associated factor 3 (SART3) and mevalonate kinase (MVK) genes.     What are the clinical features of disseminated superficial actinic porokeratosis?  DSAP mainly affects the lower arms and legs bilaterally and arises more frequently on the lower legs. There may be few or innumerable lesions. The forehead and cheeks are affected in less than 10% of individuals and DSAP almost never occurs on the " scalp, palms or soles. It tends to be more prominent in the summer and may appear less prominent in winter. New lesions have been provoked by ultraviolet light in sun lamps.   The lesions are composed of multiple irregular roundish, annular or polycyclic plaques, each of which has an elevated horny rim. The visibility of this rim is markedly accentuated by the application of artificial tanning solution (dihydroxyacetone).     The smallest DSAP lesion is a 1-3 mm conical papule, skin coloured, brownish red or brown in colour. It is based around a hair follicle containing a keratotic (scaly) plug. Larger plaques have a sharp, slightly raised, keratotic ring, a fraction of a millimetre thick, with a diameter of 10 mm or more. The skin within the ring is thinned and mildly reddened or slightly brown, and a pale ring may be seen just within the ridge. The ridge itself is often a darker brown than the rest of the lesion. The central area is most often pale and smooth, but it may be red, scaly, dry, or have scaly follicular plugs.    Sweating is absent within the lesions. Although most often asymptomatic, sun exposure or heat may cause them to itch or sting.     What are the complications of DSAP?  Development of squamous cell carcinoma (SCC) within a DSAP lesion is the main concern. This is uncommon (< 10% of individuals with DSAP develop SCC). However, many patients with DSAP have had significant exposure to the sun and may also have actinic keratoses and other forms of skin cancer (particularly basal cell carcinoma). SCC presents as a solitary tender enlarging scaly or ulcerated plaque or nodule.     How is DSAP diagnosed?  The diagnosis of porokeratosis is usually clinical, with the help of dermoscopy. DSAP is sometimes diagnosed by finding characteristic features on pathology, in which the scaly rim of DSAP is described as a parakeratotic cornoid lamella. The diagnosis can also be missed on a biopsy if the specimen  does not include the rim, it is poorly orientated, or the pathologist's attention is not drawn to the horny ridge seen clinically.    What is the differential diagnosis of DSAP?  There are several kinds of porokeratosis, and these can occur in family members or in the patient that has DSAP.   DSAP is sometimes confused with multiple actinic keratoses, but actinic keratoses are more likely to arise on the face and hands and have a central scale rather than peripheral scale.    What is the treatment for DSAP?  Unfortunately, in our present state of knowledge, there is no very satisfactory treatment for DSAP. Over the years the agents that have been tried include:  Cryotherapy   5-Fluorouracil cream   Imiquimod cream   Tretinoin cream   Ingenol mebutate gel   Alpha hydroxy acid cream   Calcipotriol ointment   Diclofenac gel   Oral acitretin   Photodynamic therapy   Grenz ray therapy   Laser treatments.    To date, no treatment has proved effective long term. Most people settle for just having the larger lesions frozen lightly and returning as necessary for further treatments, using a moisturiser to reduce the dry feeling.  If the DSAP has been induced by drug-induced immune suppression, withdrawal of the drug has been reported to result in remission of DSAP.    Sun protection  Restriction of sun exposure by wearing long sleeves, skirts or slacks and using sunscreens on the legs and arms is believed to reduce or delay the development of new lesions.     INFLAMED SEBORRHEIC KERATOSIS    Physical Exam:  Anatomic Location Affected & Morphological Description:  Waxy well demarcated sessile stuck on brown papule/s with erythema/crusting on the left neck      Additional History of Present Condition:  Present for years, recently has become painful, irritated    Assessment and Plan:  Based on a thorough discussion of this condition and the management approach to it (including a comprehensive discussion of the known risks, side  effects and potential benefits of treatment), the patient (family) agrees to implement the following specific plan:  Cryotherapy given symptoms and inflammation  After care discussed    PROCEDURE:  DESTRUCTION OF BENIGN LESIONS  After a thorough discussion of treatment options and risk/benefits/alternatives (including but not limited to local pain, scarring, dyspigmentation, blistering, and possible superinfection), verbal and written consent were obtained and the aforementioned lesions were treated on with cryotherapy using liquid nitrogen x 1 cycle for 5-10 seconds.    TOTAL NUMBER of 1 lesions were treated today on the ANATOMIC LOCATION: left neck    The patient tolerated the procedure well, and after-care instructions were provided.      Scribe Attestation      I,:  Dianne Swann MA am acting as a scribe while in the presence of the attending physician.:       I,:  Shelby Hung MD personally performed the services described in this documentation    as scribed in my presence.:                  [1] No Known Allergies  [2]   Current Outpatient Medications:     losartan (COZAAR) 25 mg tablet, Take 1 tablet (25 mg total) by mouth in the morning., Disp: 90 tablet, Rfl: 3    Ascorbic Acid 500 MG CAPS, Take 1,000 mg by mouth 2 (two) times a day   (Patient not taking: Reported on 12/17/2024), Disp: , Rfl:     metroNIDAZOLE (FLAGYL) 250 mg tablet, , Disp: , Rfl:     multivitamin (THERAGRAN) TABS, Take 1 tablet by mouth daily  (Patient not taking: Reported on 12/17/2024), Disp: , Rfl:     tetracycline (ACHROMYCIN,SUMYCIN) 500 MG capsule, , Disp: , Rfl:

## 2025-06-19 NOTE — PATIENT INSTRUCTIONS
"DISSEMINATED SUPERFICIAL ACTINIC POROKERATOSIS (DSAP)    Assessment and Plan:  Based on a thorough discussion of this condition and the management approach to it (including a comprehensive discussion of the known risks, side effects and potential benefits of treatment), the patient (family) agrees to implement the following specific plan:  Start using a thick daily moisturizer like CeraVe or Eucerin  If you decide you want to do more treatment call the office for an appointment or contact Dr. Hung directly through [facility]        What is disseminated superficial actinic porokeratosis?  Disseminated superficial actinic porokeratosis, or DSAP, is an inherited keratinisation disorder that causes discrete dry patches on the arms and legs.  DSAP is a special type of inherited 'sunspot\". The name porokeratosis means scaly pore, and is a misnomer as porokeratosis is not related to pores.    Who gets disseminated superficial actinic porokeratosis?  DSAP most often affects people of  descent, although it has also been reported to affect individuals of other races. It is more common in women than in men.  The average age which patients first notice DSAP is about 35-40 years and its frequency in affected families increases steadily with age. It is rare in childhood.  DSAP may arise in immune suppressed patients, including after organ transplantation. Its onset can also be triggered by sun exposure, phototherapy, injury, infection or systemic disease.    What causes disseminated superficial actinic porokeratosis?  DSAP is due to a genetic mutation. The tendency to DSAP is inherited as an autosomal dominant characteristic, which means on average half of the children of an affected parent will also have the tendency. A locus on chromosome 12 was found to be responsible for DSAP in a [place] family (J Invest Dermatol 2000 Riki;114(6):1071-4). The causative genes in affected families have included spliceosome-associated " factor 3 (SART3) and mevalonate kinase (MVK) genes.     What are the clinical features of disseminated superficial actinic porokeratosis?  DSAP mainly affects the lower arms and legs bilaterally and arises more frequently on the lower legs. There may be few or innumerable lesions. The forehead and cheeks are affected in less than 10% of individuals and DSAP almost never occurs on the scalp, palms or soles. It tends to be more prominent in the summer and may appear less prominent in winter. New lesions have been provoked by ultraviolet light in sun lamps.   The lesions are composed of multiple irregular roundish, annular or polycyclic plaques, each of which has an elevated horny rim. The visibility of this rim is markedly accentuated by the application of artificial tanning solution (dihydroxyacetone).     The smallest DSAP lesion is a 1-3 mm conical papule, skin coloured, brownish red or brown in colour. It is based around a hair follicle containing a keratotic (scaly) plug. Larger plaques have a sharp, slightly raised, keratotic ring, a fraction of a millimetre thick, with a diameter of 10 mm or more. The skin within the ring is thinned and mildly reddened or slightly brown, and a pale ring may be seen just within the ridge. The ridge itself is often a darker brown than the rest of the lesion. The central area is most often pale and smooth, but it may be red, scaly, dry, or have scaly follicular plugs.    Sweating is absent within the lesions. Although most often asymptomatic, sun exposure or heat may cause them to itch or sting.     What are the complications of DSAP?  Development of squamous cell carcinoma (SCC) within a DSAP lesion is the main concern. This is uncommon (< 10% of individuals with DSAP develop SCC). However, many patients with DSAP have had significant exposure to the sun and may also have actinic keratoses and other forms of skin cancer (particularly basal cell carcinoma). SCC presents as a  solitary tender enlarging scaly or ulcerated plaque or nodule.     How is DSAP diagnosed?  The diagnosis of porokeratosis is usually clinical, with the help of dermoscopy. DSAP is sometimes diagnosed by finding characteristic features on pathology, in which the scaly rim of DSAP is described as a parakeratotic cornoid lamella. The diagnosis can also be missed on a biopsy if the specimen does not include the rim, it is poorly orientated, or the pathologist's attention is not drawn to the horny ridge seen clinically.    What is the differential diagnosis of DSAP?  There are several kinds of porokeratosis, and these can occur in family members or in the patient that has DSAP.   DSAP is sometimes confused with multiple actinic keratoses, but actinic keratoses are more likely to arise on the face and hands and have a central scale rather than peripheral scale.    What is the treatment for DSAP?  Unfortunately, in our present state of knowledge, there is no very satisfactory treatment for DSAP. Over the years the agents that have been tried include:  Cryotherapy   5-Fluorouracil cream   Imiquimod cream   Tretinoin cream   Ingenol mebutate gel   Alpha hydroxy acid cream   Calcipotriol ointment   Diclofenac gel   Oral acitretin   Photodynamic therapy   Grenz ray therapy   Laser treatments.    To date, no treatment has proved effective long term. Most people settle for just having the larger lesions frozen lightly and returning as necessary for further treatments, using a moisturiser to reduce the dry feeling.  If the DSAP has been induced by drug-induced immune suppression, withdrawal of the drug has been reported to result in remission of DSAP.    Sun protection  Restriction of sun exposure by wearing long sleeves, skirts or slacks and using sunscreens on the legs and arms is believed to reduce or delay the development of new lesions.     INFLAMED SEBORRHEIC KERATOSIS    Assessment and Plan:  Based on a thorough discussion  of this condition and the management approach to it (including a comprehensive discussion of the known risks, side effects and potential benefits of treatment), the patient (family) agrees to implement the following specific plan:  Cryotherapy given symptoms and inflammation  After care discussed

## 2025-07-16 ENCOUNTER — TELEPHONE (OUTPATIENT)
Age: 75
End: 2025-07-16

## 2025-07-16 NOTE — TELEPHONE ENCOUNTER
07/16/25    Patient called office today and requested to schedule a NP Appt with Neurology for Off Balance, Dizziness and some Sharp Stabbing Pain the head BUT NOT RELATED To his OFF BALANCE AND DIZZINESS.    Per Patient No Imaging Related to Condition.    Never seen a Neurologist.      Offer Next Available and to be place on the waiting list.    Patient Accepted.    Per Patient OK, NP Appt scheduled for 10/21/25, 9:30 AM With   Dr. Cole at the [facility].   Appt placed on the waiting list as High Priority Per [facility].    Appt detailed provided.      Any questions, please contact Patient.  Thank You.       PER [facility]:   Results - Continue Scheduling   Visit: URGENT NEW PATIENT PG   Replace the original visit type.  Provider/Subgroup    Schedule with subgroup NEUROLOGY DIZZINESS, VERTIGO,   BALANCE ISSUES OR SUDDEN LOSS OF COORDINATION      Schedule Instructions    Please change calendar date to 1 WEEK from now, and then search for openings within 1-3 weeks.   If nothing within that timeframe,   please schedule to first available after that and edit waitlist to HIGH PRIORITY.

## 2025-07-24 ENCOUNTER — OFFICE VISIT (OUTPATIENT)
Dept: NEUROLOGY | Facility: CLINIC | Age: 75
End: 2025-07-24
Payer: COMMERCIAL

## 2025-07-24 VITALS
BODY MASS INDEX: 22.04 KG/M2 | SYSTOLIC BLOOD PRESSURE: 120 MMHG | DIASTOLIC BLOOD PRESSURE: 82 MMHG | RESPIRATION RATE: 18 BRPM | WEIGHT: 145.4 LBS | HEIGHT: 68 IN | HEART RATE: 83 BPM | OXYGEN SATURATION: 98 %

## 2025-07-24 DIAGNOSIS — I67.1 CEREBRAL ANEURYSM, NONRUPTURED: ICD-10-CM

## 2025-07-24 DIAGNOSIS — R51.9 HEADACHE: ICD-10-CM

## 2025-07-24 DIAGNOSIS — R51.9 NEW ONSET HEADACHE: Primary | ICD-10-CM

## 2025-07-24 PROCEDURE — 99205 OFFICE O/P NEW HI 60 MIN: CPT | Performed by: PSYCHIATRY & NEUROLOGY

## 2025-08-04 ENCOUNTER — TELEPHONE (OUTPATIENT)
Age: 75
End: 2025-08-04

## 2025-08-05 ENCOUNTER — OFFICE VISIT (OUTPATIENT)
Dept: CARDIOLOGY CLINIC | Facility: CLINIC | Age: 75
End: 2025-08-05
Payer: COMMERCIAL

## 2025-08-05 VITALS
HEART RATE: 80 BPM | HEIGHT: 68 IN | OXYGEN SATURATION: 99 % | BODY MASS INDEX: 22.23 KG/M2 | DIASTOLIC BLOOD PRESSURE: 64 MMHG | SYSTOLIC BLOOD PRESSURE: 122 MMHG | WEIGHT: 146.7 LBS

## 2025-08-05 DIAGNOSIS — I42.1 HOCM (HYPERTROPHIC OBSTRUCTIVE CARDIOMYOPATHY) (HCC): ICD-10-CM

## 2025-08-05 DIAGNOSIS — R00.2 PALPITATIONS: ICD-10-CM

## 2025-08-05 DIAGNOSIS — I10 PRIMARY HYPERTENSION: ICD-10-CM

## 2025-08-05 DIAGNOSIS — I51.7 LEFT VENTRICULAR HYPERTROPHY BY ELECTROCARDIOGRAM: Primary | ICD-10-CM

## 2025-08-05 PROCEDURE — 99214 OFFICE O/P EST MOD 30 MIN: CPT | Performed by: INTERNAL MEDICINE

## 2025-08-07 ENCOUNTER — OFFICE VISIT (OUTPATIENT)
Dept: DERMATOLOGY | Facility: CLINIC | Age: 75
End: 2025-08-07

## 2025-08-07 DIAGNOSIS — L82.0 SEBORRHEIC KERATOSES, INFLAMED: ICD-10-CM

## 2025-08-07 DIAGNOSIS — L56.5 DSAP (DISSEMINATED SUPERFICIAL ACTINIC POROKERATOSIS): Primary | ICD-10-CM

## 2025-08-07 LAB
BUN SERPL-MCNC: 12 MG/DL (ref 8–27)
CREAT SERPL-MCNC: 1.02 MG/DL (ref 0.76–1.27)
EGFR: 78 ML/MIN/1.73

## 2025-08-08 PROBLEM — Z85.47 HISTORY OF MALIGNANT NEOPLASM OF TESTIS: Status: ACTIVE | Noted: 2025-08-08

## 2025-08-08 PROBLEM — N40.0 BENIGN PROSTATIC HYPERPLASIA: Status: ACTIVE | Noted: 2025-08-08

## 2025-08-08 PROBLEM — M85.80 OTHER SPECIFIED DISORDERS OF BONE DENSITY AND STRUCTURE, UNSPECIFIED SITE: Status: ACTIVE | Noted: 2025-08-08

## 2025-08-08 PROBLEM — I10 ESSENTIAL (PRIMARY) HYPERTENSION: Status: ACTIVE | Noted: 2025-08-08

## 2025-08-08 PROBLEM — M48.061 NEURAL FORAMINAL STENOSIS OF LUMBAR SPINE: Status: ACTIVE | Noted: 2025-08-08

## 2025-08-08 PROBLEM — G44.019 EPISODIC CLUSTER HEADACHE, NOT INTRACTABLE: Status: ACTIVE | Noted: 2025-08-08

## 2025-08-08 PROBLEM — I42.1 HYPERTROPHIC OBSTRUCTIVE CARDIOMYOPATHY (HCC): Status: ACTIVE | Noted: 2025-08-08

## 2025-08-17 ENCOUNTER — HOSPITAL ENCOUNTER (OUTPATIENT)
Dept: MRI IMAGING | Facility: HOSPITAL | Age: 75
Discharge: HOME | End: 2025-08-17
Payer: COMMERCIAL

## 2025-08-17 DIAGNOSIS — R51.9 NEW ONSET HEADACHE: ICD-10-CM

## 2025-08-17 DIAGNOSIS — I67.1 CEREBRAL ANEURYSM, NONRUPTURED: ICD-10-CM

## 2025-08-17 PROCEDURE — 70553 MRI BRAIN STEM W/O & W/DYE: CPT

## 2025-08-17 PROCEDURE — 70544 MR ANGIOGRAPHY HEAD W/O DYE: CPT

## 2025-08-17 PROCEDURE — A9585 GADOBUTROL INJECTION: HCPCS

## 2025-08-17 RX ORDER — GADOBUTROL 604.72 MG/ML
6 INJECTION INTRAVENOUS
Status: COMPLETED | OUTPATIENT
Start: 2025-08-17 | End: 2025-08-17

## 2025-08-17 RX ADMIN — GADOBUTROL 6 ML: 604.72 INJECTION INTRAVENOUS at 12:28

## 2025-08-21 ENCOUNTER — RESULTS FOLLOW-UP (OUTPATIENT)
Dept: NEUROLOGY | Facility: CLINIC | Age: 75
End: 2025-08-21

## 2025-08-21 ENCOUNTER — TELEPHONE (OUTPATIENT)
Age: 75
End: 2025-08-21

## 2025-08-21 DIAGNOSIS — I72.9 ANEURYSM (HCC): Primary | ICD-10-CM
